# Patient Record
Sex: FEMALE | Race: BLACK OR AFRICAN AMERICAN | Employment: OTHER | URBAN - METROPOLITAN AREA
[De-identification: names, ages, dates, MRNs, and addresses within clinical notes are randomized per-mention and may not be internally consistent; named-entity substitution may affect disease eponyms.]

---

## 2017-01-03 ENCOUNTER — TELEPHONE (OUTPATIENT)
Dept: INTERNAL MEDICINE UNIT | Facility: HOSPITAL | Age: 37
End: 2017-01-03

## 2017-01-03 NOTE — TELEPHONE ENCOUNTER
Patient discharged from [facility] on December 31, 2016.   Please call patient to schedule hospital follow-up appointment with PCP, Dr. Earnest Ocampo.

## 2017-01-05 ENCOUNTER — OFFICE VISIT (OUTPATIENT)
Dept: INTERNAL MEDICINE CLINIC | Facility: CLINIC | Age: 37
End: 2017-01-05

## 2017-01-05 ENCOUNTER — TELEPHONE (OUTPATIENT)
Dept: INTERNAL MEDICINE CLINIC | Facility: CLINIC | Age: 37
End: 2017-01-05

## 2017-01-05 VITALS
BODY MASS INDEX: 33.15 KG/M2 | DIASTOLIC BLOOD PRESSURE: 72 MMHG | WEIGHT: 229 LBS | HEIGHT: 69.5 IN | TEMPERATURE: 98 F | OXYGEN SATURATION: 96 % | HEART RATE: 86 BPM | SYSTOLIC BLOOD PRESSURE: 112 MMHG

## 2017-01-05 DIAGNOSIS — K60.3 FISTULA, ANAL: Primary | ICD-10-CM

## 2017-01-05 DIAGNOSIS — Z86.018 HISTORY OF UTERINE FIBROID: ICD-10-CM

## 2017-01-05 DIAGNOSIS — R78.81 BACTEREMIA DUE TO GRAM-POSITIVE BACTERIA: ICD-10-CM

## 2017-01-05 DIAGNOSIS — L02.31 ABSCESS OF RIGHT BUTTOCK: ICD-10-CM

## 2017-01-05 DIAGNOSIS — L02.215 PERINEAL ABSCESS: ICD-10-CM

## 2017-01-05 PROCEDURE — 99214 OFFICE O/P EST MOD 30 MIN: CPT | Performed by: INTERNAL MEDICINE

## 2017-01-05 PROCEDURE — 99212 OFFICE O/P EST SF 10 MIN: CPT | Performed by: INTERNAL MEDICINE

## 2017-01-05 RX ORDER — HYDROCODONE BITARTRATE AND ACETAMINOPHEN 10; 325 MG/1; MG/1
1-2 TABLET ORAL EVERY 6 HOURS PRN
Qty: 60 TABLET | Refills: 0 | Status: SHIPPED | OUTPATIENT
Start: 2017-01-05 | End: 2017-01-30 | Stop reason: ALTCHOICE

## 2017-01-05 NOTE — TELEPHONE ENCOUNTER
Consent noted in media tab. Verified patient agreeable to having OV note and letter faxed. Today's progress note and letter faxed to [facility].

## 2017-01-05 NOTE — TELEPHONE ENCOUNTER
Nursing please try to arrange my office note to be faxed to her employer, [employer], see other correspondence, also a letter created today.

## 2017-01-05 NOTE — PATIENT INSTRUCTIONS
1. Fistula, anal  Cont meds  - HYDROcodone-acetaminophen  MG Oral Tab; Take 1-2 tablets by mouth every 6 (six) hours as needed for Pain. Dispense: 60 tablet; Refill: 0    2.  Abscess of right buttock  See the surgery and ID   - HYDROcodone-acetaminop

## 2017-01-05 NOTE — PROGRESS NOTES
HPI:   Sean Farfan is a 43year old female who presents for complains of: Patient presents with:  Hospital F/U: Perianal fistual dc'd 12/31/2016, Medication refill pended, dr. Solomon Caicedo, on IV invanz, daptomycin and plans on having a closure of the fis female had concerns including Hospital F/U.    1. Fistula, anal  Cont meds  - HYDROcodone-acetaminophen  MG Oral Tab; Take 1-2 tablets by mouth every 6 (six) hours as needed for Pain. Dispense: 60 tablet; Refill: 0    2.  Abscess of right buttock  Se

## 2017-01-10 ENCOUNTER — HOSPITAL ENCOUNTER (INPATIENT)
Facility: HOSPITAL | Age: 37
LOS: 3 days | Discharge: HOME HEALTH | DRG: 808 | End: 2017-01-13
Attending: PHYSICIAN ASSISTANT | Admitting: HOSPITALIST
Payer: COMMERCIAL

## 2017-01-10 ENCOUNTER — TELEPHONE (OUTPATIENT)
Dept: INTERNAL MEDICINE CLINIC | Facility: CLINIC | Age: 37
End: 2017-01-10

## 2017-01-10 DIAGNOSIS — Z45.2 PICC (PERIPHERALLY INSERTED CENTRAL CATHETER) IN PLACE: ICD-10-CM

## 2017-01-10 DIAGNOSIS — R50.9 FEVER, UNSPECIFIED FEVER CAUSE: Primary | ICD-10-CM

## 2017-01-10 DIAGNOSIS — E87.1 HYPONATREMIA: ICD-10-CM

## 2017-01-10 PROBLEM — R73.9 HYPERGLYCEMIA: Status: ACTIVE | Noted: 2017-01-10

## 2017-01-10 LAB
ANION GAP SERPL CALC-SCNC: 9 MMOL/L (ref 0–18)
BASOPHILS # BLD: 0 K/UL (ref 0–0.2)
BASOPHILS NFR BLD: 0 %
BILIRUB UR QL: NEGATIVE
BUN SERPL-MCNC: 6 MG/DL (ref 8–20)
BUN/CREAT SERPL: 8.8 (ref 10–20)
CALCIUM SERPL-MCNC: 8.9 MG/DL (ref 8.5–10.5)
CHLORIDE SERPL-SCNC: 98 MMOL/L (ref 95–110)
CLARITY UR: CLEAR
CO2 SERPL-SCNC: 25 MMOL/L (ref 22–32)
COLOR UR: YELLOW
CREAT SERPL-MCNC: 0.68 MG/DL (ref 0.5–1.5)
EOSINOPHIL # BLD: 0 K/UL (ref 0–0.7)
EOSINOPHIL NFR BLD: 0 %
ERYTHROCYTE [DISTWIDTH] IN BLOOD BY AUTOMATED COUNT: 23.5 % (ref 11–15)
GLUCOSE SERPL-MCNC: 123 MG/DL (ref 70–99)
GLUCOSE UR-MCNC: NEGATIVE MG/DL
HCT VFR BLD AUTO: 37.5 % (ref 35–48)
HGB BLD-MCNC: 12 G/DL (ref 12–16)
HGB UR QL STRIP.AUTO: NEGATIVE
KETONES UR-MCNC: NEGATIVE MG/DL
LACTATE SERPL-SCNC: 1 MMOL/L (ref 0.5–2.2)
LEUKOCYTE ESTERASE UR QL STRIP.AUTO: NEGATIVE
LYMPHOCYTES # BLD: 0.6 K/UL (ref 1–4)
LYMPHOCYTES NFR BLD: 8 %
MCH RBC QN AUTO: 24.5 PG (ref 27–32)
MCHC RBC AUTO-ENTMCNC: 31.9 G/DL (ref 32–37)
MCV RBC AUTO: 76.7 FL (ref 80–100)
MONOCYTES # BLD: 0.5 K/UL (ref 0–1)
MONOCYTES NFR BLD: 7 %
NEUTROPHILS # BLD AUTO: 7.1 K/UL (ref 1.8–7.7)
NEUTROPHILS NFR BLD: 85 %
NITRITE UR QL STRIP.AUTO: NEGATIVE
OSMOLALITY UR CALC.SUM OF ELEC: 273 MOSM/KG (ref 275–295)
PH UR: 5 [PH] (ref 5–8)
PLATELET # BLD AUTO: 207 K/UL (ref 140–400)
PMV BLD AUTO: 8.9 FL (ref 7.4–10.3)
POTASSIUM SERPL-SCNC: 3.6 MMOL/L (ref 3.3–5.1)
PROT UR-MCNC: NEGATIVE MG/DL
RBC # BLD AUTO: 4.89 M/UL (ref 3.7–5.4)
SODIUM SERPL-SCNC: 132 MMOL/L (ref 136–144)
SP GR UR STRIP: 1.01 (ref 1–1.03)
UROBILINOGEN UR STRIP-ACNC: <2
VIT C UR-MCNC: NEGATIVE MG/DL
WBC # BLD AUTO: 8.3 K/UL (ref 4–11)

## 2017-01-10 PROCEDURE — 99223 1ST HOSP IP/OBS HIGH 75: CPT | Performed by: HOSPITALIST

## 2017-01-10 RX ORDER — DOCUSATE SODIUM 100 MG/1
100 CAPSULE, LIQUID FILLED ORAL 2 TIMES DAILY
Status: DISCONTINUED | OUTPATIENT
Start: 2017-01-10 | End: 2017-01-13

## 2017-01-10 RX ORDER — MELATONIN
325
Status: DISCONTINUED | OUTPATIENT
Start: 2017-01-11 | End: 2017-01-13

## 2017-01-10 RX ORDER — MORPHINE SULFATE 2 MG/ML
1 INJECTION, SOLUTION INTRAMUSCULAR; INTRAVENOUS EVERY 2 HOUR PRN
Status: DISCONTINUED | OUTPATIENT
Start: 2017-01-10 | End: 2017-01-13

## 2017-01-10 RX ORDER — BISACODYL 10 MG
10 SUPPOSITORY, RECTAL RECTAL
Status: DISCONTINUED | OUTPATIENT
Start: 2017-01-10 | End: 2017-01-13

## 2017-01-10 RX ORDER — ZOLPIDEM TARTRATE 5 MG/1
5 TABLET ORAL NIGHTLY PRN
Status: DISCONTINUED | OUTPATIENT
Start: 2017-01-10 | End: 2017-01-13

## 2017-01-10 RX ORDER — ACETAMINOPHEN 325 MG/1
650 TABLET ORAL EVERY 6 HOURS PRN
Status: DISCONTINUED | OUTPATIENT
Start: 2017-01-10 | End: 2017-01-13

## 2017-01-10 RX ORDER — IBUPROFEN 600 MG/1
600 TABLET ORAL ONCE
Status: COMPLETED | OUTPATIENT
Start: 2017-01-10 | End: 2017-01-10

## 2017-01-10 RX ORDER — MORPHINE SULFATE 4 MG/ML
4 INJECTION, SOLUTION INTRAMUSCULAR; INTRAVENOUS EVERY 2 HOUR PRN
Status: DISCONTINUED | OUTPATIENT
Start: 2017-01-10 | End: 2017-01-13

## 2017-01-10 RX ORDER — HYDROCODONE BITARTRATE AND ACETAMINOPHEN 10; 325 MG/1; MG/1
1-2 TABLET ORAL EVERY 6 HOURS PRN
Status: DISCONTINUED | OUTPATIENT
Start: 2017-01-10 | End: 2017-01-13

## 2017-01-10 RX ORDER — POLYETHYLENE GLYCOL 3350 17 G/17G
17 POWDER, FOR SOLUTION ORAL DAILY PRN
Status: DISCONTINUED | OUTPATIENT
Start: 2017-01-10 | End: 2017-01-13

## 2017-01-10 RX ORDER — SODIUM PHOSPHATE, DIBASIC AND SODIUM PHOSPHATE, MONOBASIC 7; 19 G/133ML; G/133ML
1 ENEMA RECTAL ONCE AS NEEDED
Status: ACTIVE | OUTPATIENT
Start: 2017-01-10 | End: 2017-01-10

## 2017-01-10 RX ORDER — MORPHINE SULFATE 2 MG/ML
2 INJECTION, SOLUTION INTRAMUSCULAR; INTRAVENOUS EVERY 2 HOUR PRN
Status: DISCONTINUED | OUTPATIENT
Start: 2017-01-10 | End: 2017-01-13

## 2017-01-10 NOTE — TELEPHONE ENCOUNTER
Order signed and faxed back to [employer] [phone], conformation received. Original sent to scan.

## 2017-01-10 NOTE — ED NOTES
Pt c/o fever of 102 earlier at home. Pt took x2 doses of extra strength tylenol per ID MD. Pt has had a PICC line for the past few weeks for abx therapy due to two anal fistulas. Pt is scheduled for fistula repair on 1/18.

## 2017-01-11 LAB
ANION GAP SERPL CALC-SCNC: 7 MMOL/L (ref 0–18)
BASOPHILS # BLD: 0 K/UL (ref 0–0.2)
BASOPHILS NFR BLD: 1 %
BUN SERPL-MCNC: 8 MG/DL (ref 8–20)
BUN/CREAT SERPL: 11.1 (ref 10–20)
CALCIUM SERPL-MCNC: 8.7 MG/DL (ref 8.5–10.5)
CHLORIDE SERPL-SCNC: 105 MMOL/L (ref 95–110)
CO2 SERPL-SCNC: 26 MMOL/L (ref 22–32)
CREAT SERPL-MCNC: 0.72 MG/DL (ref 0.5–1.5)
EOSINOPHIL # BLD: 0 K/UL (ref 0–0.7)
EOSINOPHIL NFR BLD: 1 %
ERYTHROCYTE [DISTWIDTH] IN BLOOD BY AUTOMATED COUNT: 23.8 % (ref 11–15)
GLUCOSE SERPL-MCNC: 101 MG/DL (ref 70–99)
HCT VFR BLD AUTO: 35 % (ref 35–48)
HGB BLD-MCNC: 11.2 G/DL (ref 12–16)
LYMPHOCYTES # BLD: 0.9 K/UL (ref 1–4)
LYMPHOCYTES NFR BLD: 36 %
MCH RBC QN AUTO: 24.7 PG (ref 27–32)
MCHC RBC AUTO-ENTMCNC: 31.9 G/DL (ref 32–37)
MCV RBC AUTO: 77.3 FL (ref 80–100)
MONOCYTES # BLD: 0.3 K/UL (ref 0–1)
MONOCYTES NFR BLD: 12 %
NEUTROPHILS # BLD AUTO: 1.3 K/UL (ref 1.8–7.7)
NEUTROPHILS NFR BLD: 51 %
OSMOLALITY UR CALC.SUM OF ELEC: 284 MOSM/KG (ref 275–295)
PLATELET # BLD AUTO: 182 K/UL (ref 140–400)
PMV BLD AUTO: 8.8 FL (ref 7.4–10.3)
POTASSIUM SERPL-SCNC: 4.2 MMOL/L (ref 3.3–5.1)
RBC # BLD AUTO: 4.53 M/UL (ref 3.7–5.4)
SODIUM SERPL-SCNC: 138 MMOL/L (ref 136–144)
WBC # BLD AUTO: 2.5 K/UL (ref 4–11)

## 2017-01-11 PROCEDURE — 99233 SBSQ HOSP IP/OBS HIGH 50: CPT | Performed by: HOSPITALIST

## 2017-01-11 RX ORDER — SODIUM CHLORIDE 0.9 % (FLUSH) 0.9 %
SYRINGE (ML) INJECTION
Status: COMPLETED
Start: 2017-01-11 | End: 2017-01-11

## 2017-01-11 RX ORDER — POTASSIUM CHLORIDE 29.8 MG/ML
40 INJECTION INTRAVENOUS ONCE
Status: COMPLETED | OUTPATIENT
Start: 2017-01-11 | End: 2017-01-11

## 2017-01-11 RX ORDER — SODIUM CHLORIDE 0.9 % (FLUSH) 0.9 %
SYRINGE (ML) INJECTION
Status: DISPENSED
Start: 2017-01-11 | End: 2017-01-12

## 2017-01-11 NOTE — DISCHARGE PLANNING
ROLY is familiar with this pt from recent [address] visit. ROLY confirmed the pt is current with [facility] for RN services only. Pt is also current with [facility]. ROLY informed both agencies of pt's readmission.  C orders are needed at dc to resume the service

## 2017-01-11 NOTE — PROGRESS NOTES
[place] [facility]    Progress Note    Dedese Diedre Holstein Patient Status:  Inpatient    1980 MRN [id number]   Location [facility] 5SW/SE Attending Nick Chavez MD   [facility] Day # 1 PCP Felton Conner DO       Subjective:    Al

## 2017-01-11 NOTE — CONSULTS
INFECTIOUS DISEASE CONSULT NOTE    Mirian Cowart Patient Status:  Inpatient    1980 MRN [id number]   Location [facility] 5SW/SE Attending Eric Jimenez MD   Hosp Day # 1 PCP [facility] axillary flap. Started on Dapto/merop. Underwent MRI pelvis with finding of perianal fistula with communication with skin surface and abscess. Dischcharged on dapto/ertapenem. Now with fevers up to 102F at home and chills.      On admission Tm 99.5, WBC 8.3 reports that she does not drink alcohol or use illicit drugs.     Allergies:    Latex                   Anaphylaxis    Medications:    Current facility-administered medications:   •  Normal Saline Flush 0.9 % injection, , ,   •  acetaminophen (TYLENOL) tab area w/o erythema, warmth, ttp, drainage    Laboratory Data:    Recent Labs   Lab  01/11/17   0546   RBC  4.53   HGB  11.2*   HCT  35.0   MCV  77.3*   MCH  24.7*   MCHC  31.9*   RDW  23.8*   WBC  2.5*   PLT  182     Recent Labs   Lab  01/10/17   1546  01/1 bactrim and minocycline.  Now readmitted with right buttock cellulitis, ? abscess with fistula developed on po abx.  Scheduled 1/4/17 for left axillary flap. Started on Dapto/merop.  Underwent MRI pelvis with finding of perianal fistula with communication wi

## 2017-01-11 NOTE — H&P
[facility]    PATIENT'S NAME: Sarah Alcala   ATTENDING PHYSICIAN: Zo Martinez MD   PATIENT ACCOUNT#:   [de-identified]    LOCATION:  [address] [place] [address] RECORD #:   [id number]       YOB: 1980  ADMISSION DATE:       01/10/2  of myocardial infarction. Mother had hypertension and hypothyroidism. SOCIAL HISTORY:  No tobacco, alcohol, or drug use. Lives with her family. Usually independent for basic activities of daily living.      REVIEW OF SYSTEMS:  Fever and diaphore Pain control as needed. 5.   Follow up on culture results. 6.   Further recommendations to follow.     Dictated By Rhoda Mendoza MD  d: 01/10/2017 18:57:42  t: 01/10/2017 22:02:05  [facility] [id number]  FB/

## 2017-01-11 NOTE — TELEPHONE ENCOUNTER
call from Javier Branch NP in ER. pt has anal fistula and temp 102. 1. To admit. Routed to Dr. Namrata Camara as Simón Vivar.

## 2017-01-11 NOTE — ED PROVIDER NOTES
Patient Seen in: [facility] Emergency Department    History   Patient presents with:  Fever    Stated Complaint: fever/picc    HPI    24-year-old female presents with chief complaint of fever. Onset this morning.   Patient states that she was refe axilla flap, groin reconstruction    APPENDECTOMY         Medications :   HYDROcodone-acetaminophen  MG Oral Tab,  Take 1-2 tablets by mouth every 6 (six) hours as needed for Pain.    naproxen 500 MG Oral Tab,  Take 1 tablet (500 mg total) by mouth 2 distress. Psychological: Alert, No abnormalities of mood, affect. Eyes: Pupils are equal round reactive to light. Conjunctiva are within normal limits. ENT: Oropharynx is clear. Neck: The neck is supple. There is no evidence of JVD.   No meningeal sig Abnormal            Final result                 Please view results for these tests on the individual orders.    URINALYSIS WITH CULTURE REFLEX   RAINBOW DRAW BLUE   RAINBOW DRAW GOLD   RAINBOW DRAW LAVENDER   RAINBOW DRAW LIGHT GREEN   RAINBOW DRAW DARK

## 2017-01-12 LAB
ANION GAP SERPL CALC-SCNC: 7 MMOL/L (ref 0–18)
BASOPHILS # BLD: 0 K/UL (ref 0–0.2)
BASOPHILS NFR BLD: 1 %
BUN SERPL-MCNC: 6 MG/DL (ref 8–20)
BUN/CREAT SERPL: 8 (ref 10–20)
CALCIUM SERPL-MCNC: 8.7 MG/DL (ref 8.5–10.5)
CHLORIDE SERPL-SCNC: 104 MMOL/L (ref 95–110)
CO2 SERPL-SCNC: 27 MMOL/L (ref 22–32)
CREAT SERPL-MCNC: 0.75 MG/DL (ref 0.5–1.5)
EOSINOPHIL # BLD: 0 K/UL (ref 0–0.7)
EOSINOPHIL NFR BLD: 1 %
ERYTHROCYTE [DISTWIDTH] IN BLOOD BY AUTOMATED COUNT: 23.5 % (ref 11–15)
GLUCOSE SERPL-MCNC: 92 MG/DL (ref 70–99)
HCT VFR BLD AUTO: 36.1 % (ref 35–48)
HGB BLD-MCNC: 11.4 G/DL (ref 12–16)
LYMPHOCYTES # BLD: 2 K/UL (ref 1–4)
LYMPHOCYTES NFR BLD: 61 %
MCH RBC QN AUTO: 24.7 PG (ref 27–32)
MCHC RBC AUTO-ENTMCNC: 31.7 G/DL (ref 32–37)
MCV RBC AUTO: 78 FL (ref 80–100)
MONOCYTES # BLD: 0.7 K/UL (ref 0–1)
MONOCYTES NFR BLD: 21 %
NEUTROPHILS # BLD AUTO: 0.5 K/UL (ref 1.8–7.7)
NEUTROPHILS NFR BLD: 15 %
OSMOLALITY UR CALC.SUM OF ELEC: 283 MOSM/KG (ref 275–295)
PLATELET # BLD AUTO: 191 K/UL (ref 140–400)
PMV BLD AUTO: 8.8 FL (ref 7.4–10.3)
POTASSIUM SERPL-SCNC: 3.8 MMOL/L (ref 3.3–5.1)
RBC # BLD AUTO: 4.62 M/UL (ref 3.7–5.4)
SODIUM SERPL-SCNC: 138 MMOL/L (ref 136–144)
WBC # BLD AUTO: 3.2 K/UL (ref 4–11)

## 2017-01-12 PROCEDURE — 99233 SBSQ HOSP IP/OBS HIGH 50: CPT | Performed by: HOSPITALIST

## 2017-01-12 RX ORDER — METRONIDAZOLE 500 MG/100ML
500 INJECTION, SOLUTION INTRAVENOUS EVERY 8 HOURS
Status: DISCONTINUED | OUTPATIENT
Start: 2017-01-12 | End: 2017-01-13

## 2017-01-12 RX ORDER — LEVOFLOXACIN 5 MG/ML
750 INJECTION, SOLUTION INTRAVENOUS EVERY 24 HOURS
Status: DISCONTINUED | OUTPATIENT
Start: 2017-01-12 | End: 2017-01-13

## 2017-01-12 RX ORDER — SODIUM CHLORIDE 0.9 % (FLUSH) 0.9 %
SYRINGE (ML) INJECTION
Status: COMPLETED
Start: 2017-01-12 | End: 2017-01-12

## 2017-01-12 NOTE — PROGRESS NOTES
INFECTIOUS DISEASE PROGRESS NOTE    Mirian Valerio Patient Status:  Inpatient    1980 MRN [id number]   Location [facility] 5SW/SE Attending Nidhi Belcher MD   [address] Day # 2 PCP Savannah Mae DO     Subjective:  Afebrile.  No acu therapy recently admitted to [facility] 10/21 - 10/26 with increasing pain and drainage from right buttocks with associated fevers at home to 101.2.  Patient seen in urgent care center on 10/5 for similar symptoms and given Bactrim and Keflex which patient finish    # Hidradenitis suppurativa with h/o multiple surgeries to bilateral axilla and pelvic region flap and skin grafting - most recently March 2016. Scheduled 1/4/17 for left axillary flap.     Recommendations:    - continue daptomycin  - stop meropenem  - st

## 2017-01-13 VITALS
OXYGEN SATURATION: 95 % | BODY MASS INDEX: 33.77 KG/M2 | HEIGHT: 69 IN | WEIGHT: 228 LBS | TEMPERATURE: 98 F | RESPIRATION RATE: 16 BRPM | HEART RATE: 61 BPM | SYSTOLIC BLOOD PRESSURE: 103 MMHG | DIASTOLIC BLOOD PRESSURE: 55 MMHG

## 2017-01-13 LAB
ANION GAP SERPL CALC-SCNC: 5 MMOL/L (ref 0–18)
BASOPHILS # BLD: 0 K/UL (ref 0–0.2)
BASOPHILS NFR BLD: 1 %
BUN SERPL-MCNC: 9 MG/DL (ref 8–20)
BUN/CREAT SERPL: 12.5 (ref 10–20)
CALCIUM SERPL-MCNC: 8.9 MG/DL (ref 8.5–10.5)
CHLORIDE SERPL-SCNC: 105 MMOL/L (ref 95–110)
CO2 SERPL-SCNC: 28 MMOL/L (ref 22–32)
CREAT SERPL-MCNC: 0.72 MG/DL (ref 0.5–1.5)
EOSINOPHIL # BLD: 0.1 K/UL (ref 0–0.7)
EOSINOPHIL NFR BLD: 1 %
ERYTHROCYTE [DISTWIDTH] IN BLOOD BY AUTOMATED COUNT: 23.3 % (ref 11–15)
GLUCOSE SERPL-MCNC: 90 MG/DL (ref 70–99)
HCT VFR BLD AUTO: 35 % (ref 35–48)
HGB BLD-MCNC: 11.2 G/DL (ref 12–16)
LYMPHOCYTES # BLD: 2.7 K/UL (ref 1–4)
LYMPHOCYTES NFR BLD: 64 %
MCH RBC QN AUTO: 24.9 PG (ref 27–32)
MCHC RBC AUTO-ENTMCNC: 32 G/DL (ref 32–37)
MCV RBC AUTO: 77.7 FL (ref 80–100)
MONOCYTES # BLD: 0.5 K/UL (ref 0–1)
MONOCYTES NFR BLD: 13 %
NEUTROPHILS # BLD AUTO: 0.9 K/UL (ref 1.8–7.7)
NEUTROPHILS NFR BLD: 21 %
OSMOLALITY UR CALC.SUM OF ELEC: 284 MOSM/KG (ref 275–295)
PLATELET # BLD AUTO: 208 K/UL (ref 140–400)
PMV BLD AUTO: 8.7 FL (ref 7.4–10.3)
POTASSIUM SERPL-SCNC: 4.3 MMOL/L (ref 3.3–5.1)
RBC # BLD AUTO: 4.51 M/UL (ref 3.7–5.4)
SODIUM SERPL-SCNC: 138 MMOL/L (ref 136–144)
WBC # BLD AUTO: 4.2 K/UL (ref 4–11)

## 2017-01-13 PROCEDURE — 99239 HOSP IP/OBS DSCHRG MGMT >30: CPT | Performed by: HOSPITALIST

## 2017-01-13 RX ORDER — MAGNESIUM HYDROXIDE/ALUMINUM HYDROXICE/SIMETHICONE 120; 1200; 1200 MG/30ML; MG/30ML; MG/30ML
30 SUSPENSION ORAL ONCE
Status: DISCONTINUED | OUTPATIENT
Start: 2017-01-13 | End: 2017-01-13 | Stop reason: RX

## 2017-01-13 RX ORDER — LEVOFLOXACIN 750 MG/1
750 TABLET ORAL DAILY
Qty: 7 TABLET | Refills: 0 | Status: SHIPPED | OUTPATIENT
Start: 2017-01-13 | End: 2017-01-20

## 2017-01-13 RX ORDER — PANTOPRAZOLE SODIUM 40 MG/1
40 TABLET, DELAYED RELEASE ORAL
Status: DISCONTINUED | OUTPATIENT
Start: 2017-01-13 | End: 2017-01-13

## 2017-01-13 RX ORDER — SODIUM CHLORIDE 0.9 % (FLUSH) 0.9 %
SYRINGE (ML) INJECTION
Status: COMPLETED
Start: 2017-01-13 | End: 2017-01-13

## 2017-01-13 RX ORDER — METRONIDAZOLE 500 MG/1
500 TABLET ORAL 3 TIMES DAILY
Qty: 21 TABLET | Refills: 0 | Status: SHIPPED | OUTPATIENT
Start: 2017-01-13 | End: 2017-01-20

## 2017-01-13 RX ORDER — PHENOBARBITAL, HYOSCYAMINE SULFATE, ATROPINE SULFATE, SCOPOLAMINE HYDROBROMIDE .0194; .1037; 16.2; .0065 MG/5ML; MG/5ML; MG/5ML; MG/5ML
10 ELIXIR ORAL ONCE
Status: DISCONTINUED | OUTPATIENT
Start: 2017-01-13 | End: 2017-01-13 | Stop reason: RX

## 2017-01-13 NOTE — PROGRESS NOTES
INFECTIOUS DISEASE PROGRESS NOTE    Alvmaribelse Levy Koyanagi Patient Status:  Inpatient    1980 MRN [id number]   Location [facility] 5SW/SE Attending Wilma Quiroga MD   [facility] Day # 3 PCP Mateus Sebastian DO     Subjective:  Afebrile.  No acu axilla including flap and skin grafting to Remicade therapy in the past but not currently on specific therapy recently admitted to [address] [place] [address] 10/21 - 10/26 with increasing pain and drainage from right buttocks with associated fevers at home to 101.2.  Patient see intra-op colonoscopy w/o fistula.  Repeat CT pelvis 11/8 with possible fistula or abscess, s/p prolonged course iv abx, daptomycin.    # Hidradenitis suppurativa with h/o multiple surgeries to bilateral axilla and pelvic region flap and skin grafting - mos

## 2017-01-13 NOTE — DISCHARGE PLANNING
SW spoke with ID who states pt is stable to return back home. MD order was already obtained by the primary. SW placed call to [facility]/Radha and sent the clinical information to [facility] and [facility].     CINDY underwood [id number]

## 2017-01-13 NOTE — DISCHARGE SUMMARY
[place] [facility] - [facility]    Discharge Summary    Mirian Andrade Patient Status:  Inpatient    1980 MRN [id number]   Location [facility] 5SW/SE Attending Lianet Rios MD   [facility] Day # 3 PCP Destiny Murray,      Date of Admiss antibiotics    Right buttock cellulits with phlegmon and perianal fistula  - on antibioitics  - scheduled for fistula surgery on 1/18        Hyponatremia  - secondary to dehydration  - now resolving      Hidrantitis Suppranteva  -on daptomycin  - levaquin (500 mg total) by mouth 3 (three) times daily.     Stop taking on:  1/20/2017   Quantity:  21 tablet   Refills:  0         CONTINUE taking these medications       Instructions Prescription details    DAPTOmycin 50 mg/mL in Sterile Water for Injection   Last

## 2017-01-13 NOTE — PROGRESS NOTES
[facility]    Progress Note    Mirian Cowart Patient Status:  Inpatient    1980 MRN [id number]   Location [facility] 5SW/SE Attending Eric Jimenez MD   [facility] Day # 2 PCP Nicole Vences DO       Subjective:    Al fevers  - secondary to above  - on antibiotics    Right buttock cellulits with phlegmon and perianal fistula  - on antibioitics  - scheduled for fistula surgery on 1/18        Hyponatremia  - secondary to dehydration  - now resolving  - monitor BMP     Hid

## 2017-01-13 NOTE — PLAN OF CARE
Infection    • Signs and symptoms of infections are decreased or avoided Progressing        PAIN - ADULT    • Verbalizes/displays adequate comfort level or patient's stated pain goal Progressing        Patient/Family Goals    • Patient/Family [facility]

## 2017-01-13 NOTE — PAYOR COMM NOTE
Heber Velazquez #[id number]  (85 year old F)       [facility] [id number]         Ariela Feliciano MD Physician Signed  Consults 1/11/2017  2:43 PM     Consult Orders:     IP consult to [facility] [[id number]] ordered by Kriss Butler, buttock incision with increased pain with improved R buttock symptoms.  Also with increasing L axillary pain.  Admitted 11/8-11 with bilateral buttock cellulitis and abscess.  Was discharged on daptomycin and invanz.  Followed by Dr Dominguez Gray, seen in office Comment  Procedure: VXLLRUOTFFA;  Surgeon: Shantelle Jason MD;  Location: [facility] MAIN OR      SKIN SURGERY          Comment  rt axilla flap, groin reconstruction      APPENDECTOMY          Family History    Problem  Relation  Age of Onset    •  Other[other] [OTH negatives noted in the the HPI. Physical Exam:  Vital signs: Blood pressure 111/51, pulse 78, temperature 97.3 °F (36.3 °C), temperature source Axillary, resp.  rate 18, height 5' 9\" (1.753 m), weight 228 lb (103.42 kg), last menstrual period 12/27/2016 Zosyn.  CT pelvis 10/21 with perianal inflammatory changes R>L, small fluid collection R perianal region concerning for abscess. Seen by surgery and concern for possible fistula. Taken to OR 10/23 for I&D and C-scope w/o fistula.  Cx with S. Not aureus and patient, , RN, Dr. Lucia Nieves  - will follow    Thank you for allowing me to participate in the care of this patient.  Please do not hesitate to call if you have any questions.    I will continue to follow with you and will make further recommendations based o CO2  26  01/11/2017    GLU  101*  01/11/2017    CA  8.7  01/11/2017    ALB  3.2*  12/28/2016    ALKPHO  73  12/28/2016    BILT  0.4  12/28/2016    TP  6.5  12/28/2016    AST  17  12/28/2016    ALT  12*  12/28/2016    TSH  0.86  07/23/2016    MG  1.7*  10

## 2017-01-17 ENCOUNTER — TELEPHONE (OUTPATIENT)
Dept: INTERNAL MEDICINE UNIT | Facility: HOSPITAL | Age: 37
End: 2017-01-17

## 2017-01-17 NOTE — TELEPHONE ENCOUNTER
Patient discharged from [facility] on January 13, 2017.  Please call patient to schedule hospital follow-up appointment with PCP, .

## 2017-01-18 ENCOUNTER — ANESTHESIA EVENT (OUTPATIENT)
Dept: SURGERY | Facility: HOSPITAL | Age: 37
End: 2017-01-18
Payer: COMMERCIAL

## 2017-01-18 ENCOUNTER — ANESTHESIA (OUTPATIENT)
Dept: SURGERY | Facility: HOSPITAL | Age: 37
End: 2017-01-18
Payer: COMMERCIAL

## 2017-01-18 ENCOUNTER — HOSPITAL ENCOUNTER (OUTPATIENT)
Facility: HOSPITAL | Age: 37
Setting detail: HOSPITAL OUTPATIENT SURGERY
Discharge: HOME | End: 2017-01-18
Attending: SPECIALIST | Admitting: SPECIALIST
Payer: COMMERCIAL

## 2017-01-18 ENCOUNTER — SURGERY (OUTPATIENT)
Age: 37
End: 2017-01-18

## 2017-01-18 VITALS
DIASTOLIC BLOOD PRESSURE: 68 MMHG | SYSTOLIC BLOOD PRESSURE: 114 MMHG | BODY MASS INDEX: 33.01 KG/M2 | OXYGEN SATURATION: 100 % | RESPIRATION RATE: 14 BRPM | HEIGHT: 69.5 IN | HEART RATE: 58 BPM | TEMPERATURE: 97 F | WEIGHT: 228 LBS

## 2017-01-18 DIAGNOSIS — K61.1 PERI-RECTAL ABSCESS: Primary | ICD-10-CM

## 2017-01-18 LAB — B-HCG UR QL: NEGATIVE

## 2017-01-18 PROCEDURE — 81025 URINE PREGNANCY TEST: CPT

## 2017-01-18 PROCEDURE — 0DBP0ZZ EXCISION OF RECTUM, OPEN APPROACH: ICD-10-PCS | Performed by: SPECIALIST

## 2017-01-18 PROCEDURE — 88304 TISSUE EXAM BY PATHOLOGIST: CPT | Performed by: SPECIALIST

## 2017-01-18 PROCEDURE — 0DJD8ZZ INSPECTION OF LOWER INTESTINAL TRACT, VIA NATURAL OR ARTIFICIAL OPENING ENDOSCOPIC: ICD-10-PCS | Performed by: SPECIALIST

## 2017-01-18 RX ORDER — MORPHINE SULFATE 2 MG/ML
2 INJECTION, SOLUTION INTRAMUSCULAR; INTRAVENOUS EVERY 10 MIN PRN
Status: DISCONTINUED | OUTPATIENT
Start: 2017-01-18 | End: 2017-01-18

## 2017-01-18 RX ORDER — DEXAMETHASONE SODIUM PHOSPHATE 4 MG/ML
VIAL (ML) INJECTION AS NEEDED
Status: DISCONTINUED | OUTPATIENT
Start: 2017-01-18 | End: 2017-01-18 | Stop reason: SURG

## 2017-01-18 RX ORDER — LIDOCAINE HYDROCHLORIDE 10 MG/ML
INJECTION, SOLUTION EPIDURAL; INFILTRATION; INTRACAUDAL; PERINEURAL AS NEEDED
Status: DISCONTINUED | OUTPATIENT
Start: 2017-01-18 | End: 2017-01-18 | Stop reason: SURG

## 2017-01-18 RX ORDER — ONDANSETRON 2 MG/ML
4 INJECTION INTRAMUSCULAR; INTRAVENOUS ONCE AS NEEDED
Status: DISCONTINUED | OUTPATIENT
Start: 2017-01-18 | End: 2017-01-18

## 2017-01-18 RX ORDER — HYDROCODONE BITARTRATE AND ACETAMINOPHEN 5; 325 MG/1; MG/1
2 TABLET ORAL AS NEEDED
Status: DISCONTINUED | OUTPATIENT
Start: 2017-01-18 | End: 2017-01-18

## 2017-01-18 RX ORDER — HYDROMORPHONE HYDROCHLORIDE 1 MG/ML
0.2 INJECTION, SOLUTION INTRAMUSCULAR; INTRAVENOUS; SUBCUTANEOUS EVERY 5 MIN PRN
Status: DISCONTINUED | OUTPATIENT
Start: 2017-01-18 | End: 2017-01-18

## 2017-01-18 RX ORDER — METOCLOPRAMIDE 10 MG/1
10 TABLET ORAL ONCE
Status: COMPLETED | OUTPATIENT
Start: 2017-01-18 | End: 2017-01-18

## 2017-01-18 RX ORDER — MORPHINE SULFATE 10 MG/ML
6 INJECTION, SOLUTION INTRAMUSCULAR; INTRAVENOUS EVERY 10 MIN PRN
Status: DISCONTINUED | OUTPATIENT
Start: 2017-01-18 | End: 2017-01-18

## 2017-01-18 RX ORDER — BUPIVACAINE HYDROCHLORIDE 5 MG/ML
INJECTION, SOLUTION EPIDURAL; INTRACAUDAL AS NEEDED
Status: DISCONTINUED | OUTPATIENT
Start: 2017-01-18 | End: 2017-01-18 | Stop reason: HOSPADM

## 2017-01-18 RX ORDER — SODIUM CHLORIDE, SODIUM LACTATE, POTASSIUM CHLORIDE, CALCIUM CHLORIDE 600; 310; 30; 20 MG/100ML; MG/100ML; MG/100ML; MG/100ML
INJECTION, SOLUTION INTRAVENOUS CONTINUOUS PRN
Status: DISCONTINUED | OUTPATIENT
Start: 2017-01-18 | End: 2017-01-18 | Stop reason: SURG

## 2017-01-18 RX ORDER — LEVOFLOXACIN 5 MG/ML
500 INJECTION, SOLUTION INTRAVENOUS EVERY 24 HOURS
Status: DISCONTINUED | OUTPATIENT
Start: 2017-01-18 | End: 2017-01-18

## 2017-01-18 RX ORDER — FAMOTIDINE 20 MG/1
20 TABLET ORAL ONCE
Status: COMPLETED | OUTPATIENT
Start: 2017-01-18 | End: 2017-01-18

## 2017-01-18 RX ORDER — SODIUM CHLORIDE, SODIUM LACTATE, POTASSIUM CHLORIDE, CALCIUM CHLORIDE 600; 310; 30; 20 MG/100ML; MG/100ML; MG/100ML; MG/100ML
INJECTION, SOLUTION INTRAVENOUS CONTINUOUS
Status: DISCONTINUED | OUTPATIENT
Start: 2017-01-18 | End: 2017-01-18

## 2017-01-18 RX ORDER — NALOXONE HYDROCHLORIDE 0.4 MG/ML
80 INJECTION, SOLUTION INTRAMUSCULAR; INTRAVENOUS; SUBCUTANEOUS AS NEEDED
Status: DISCONTINUED | OUTPATIENT
Start: 2017-01-18 | End: 2017-01-18

## 2017-01-18 RX ORDER — KETOROLAC TROMETHAMINE 30 MG/ML
INJECTION, SOLUTION INTRAMUSCULAR; INTRAVENOUS AS NEEDED
Status: DISCONTINUED | OUTPATIENT
Start: 2017-01-18 | End: 2017-01-18 | Stop reason: SURG

## 2017-01-18 RX ORDER — MORPHINE SULFATE 4 MG/ML
4 INJECTION, SOLUTION INTRAMUSCULAR; INTRAVENOUS EVERY 10 MIN PRN
Status: DISCONTINUED | OUTPATIENT
Start: 2017-01-18 | End: 2017-01-18

## 2017-01-18 RX ORDER — HYDROMORPHONE HYDROCHLORIDE 1 MG/ML
0.4 INJECTION, SOLUTION INTRAMUSCULAR; INTRAVENOUS; SUBCUTANEOUS EVERY 5 MIN PRN
Status: DISCONTINUED | OUTPATIENT
Start: 2017-01-18 | End: 2017-01-18

## 2017-01-18 RX ORDER — HYDROMORPHONE HYDROCHLORIDE 1 MG/ML
0.6 INJECTION, SOLUTION INTRAMUSCULAR; INTRAVENOUS; SUBCUTANEOUS EVERY 5 MIN PRN
Status: DISCONTINUED | OUTPATIENT
Start: 2017-01-18 | End: 2017-01-18

## 2017-01-18 RX ORDER — MIDAZOLAM HYDROCHLORIDE 1 MG/ML
INJECTION INTRAMUSCULAR; INTRAVENOUS AS NEEDED
Status: DISCONTINUED | OUTPATIENT
Start: 2017-01-18 | End: 2017-01-18 | Stop reason: SURG

## 2017-01-18 RX ORDER — HYDROCODONE BITARTRATE AND ACETAMINOPHEN 5; 325 MG/1; MG/1
1 TABLET ORAL AS NEEDED
Status: DISCONTINUED | OUTPATIENT
Start: 2017-01-18 | End: 2017-01-18

## 2017-01-18 RX ORDER — ACETAMINOPHEN 325 MG/1
650 TABLET ORAL ONCE
Status: COMPLETED | OUTPATIENT
Start: 2017-01-18 | End: 2017-01-18

## 2017-01-18 RX ORDER — HALOPERIDOL 5 MG/ML
0.25 INJECTION INTRAMUSCULAR ONCE AS NEEDED
Status: DISCONTINUED | OUTPATIENT
Start: 2017-01-18 | End: 2017-01-18

## 2017-01-18 RX ORDER — HYDROCODONE BITARTRATE AND ACETAMINOPHEN 10; 325 MG/1; MG/1
1 TABLET ORAL ONCE
Status: COMPLETED | OUTPATIENT
Start: 2017-01-18 | End: 2017-01-18

## 2017-01-18 RX ORDER — ROCURONIUM BROMIDE 10 MG/ML
INJECTION, SOLUTION INTRAVENOUS AS NEEDED
Status: DISCONTINUED | OUTPATIENT
Start: 2017-01-18 | End: 2017-01-18 | Stop reason: SURG

## 2017-01-18 RX ORDER — NEOSTIGMINE METHYLSULFATE 0.5 MG/ML
INJECTION INTRAVENOUS AS NEEDED
Status: DISCONTINUED | OUTPATIENT
Start: 2017-01-18 | End: 2017-01-18 | Stop reason: SURG

## 2017-01-18 RX ORDER — GLYCOPYRROLATE 0.2 MG/ML
INJECTION INTRAMUSCULAR; INTRAVENOUS AS NEEDED
Status: DISCONTINUED | OUTPATIENT
Start: 2017-01-18 | End: 2017-01-18 | Stop reason: SURG

## 2017-01-18 RX ADMIN — ROCURONIUM BROMIDE 40 MG: 10 INJECTION, SOLUTION INTRAVENOUS at 09:16:00

## 2017-01-18 RX ADMIN — MIDAZOLAM HYDROCHLORIDE 2 MG: 1 INJECTION INTRAMUSCULAR; INTRAVENOUS at 09:12:00

## 2017-01-18 RX ADMIN — SODIUM CHLORIDE, SODIUM LACTATE, POTASSIUM CHLORIDE, CALCIUM CHLORIDE: 600; 310; 30; 20 INJECTION, SOLUTION INTRAVENOUS at 09:09:00

## 2017-01-18 RX ADMIN — GLYCOPYRROLATE 0.6 MG: 0.2 INJECTION INTRAMUSCULAR; INTRAVENOUS at 10:17:00

## 2017-01-18 RX ADMIN — LEVOFLOXACIN 500 MG: 5 INJECTION, SOLUTION INTRAVENOUS at 09:18:00

## 2017-01-18 RX ADMIN — GLYCOPYRROLATE 0.2 MG: 0.2 INJECTION INTRAMUSCULAR; INTRAVENOUS at 09:16:00

## 2017-01-18 RX ADMIN — DEXAMETHASONE SODIUM PHOSPHATE 4 MG: 4 MG/ML VIAL (ML) INJECTION at 09:16:00

## 2017-01-18 RX ADMIN — LIDOCAINE HYDROCHLORIDE 25 MG: 10 INJECTION, SOLUTION EPIDURAL; INFILTRATION; INTRACAUDAL; PERINEURAL at 09:16:00

## 2017-01-18 RX ADMIN — KETOROLAC TROMETHAMINE 30 MG: 30 INJECTION, SOLUTION INTRAMUSCULAR; INTRAVENOUS at 10:21:00

## 2017-01-18 RX ADMIN — SODIUM CHLORIDE, SODIUM LACTATE, POTASSIUM CHLORIDE, CALCIUM CHLORIDE: 600; 310; 30; 20 INJECTION, SOLUTION INTRAVENOUS at 10:40:00

## 2017-01-18 RX ADMIN — NEOSTIGMINE METHYLSULFATE 4 MG: 0.5 INJECTION INTRAVENOUS at 10:17:00

## 2017-01-18 NOTE — OPERATIVE REPORT
[facility]    PATIENT'S NAME: Ewelina Newman   ATTENDING PHYSICIAN: Aleja Colindres MD   OPERATING PHYSICIAN: Aleja Colindres MD   PATIENT ACCOUNT#:   [de-identified]    LOCATION:  [place] [address]  MEDICAL RECORD #:   [id number]       MILAGROS side and had a similar finding of a chronic fistula. We placed our probe. This went through also higher, above the sphincter, but not quite as high as the left side. We felt with these findings, we would have to place bilateral setons.   We began on the

## 2017-01-18 NOTE — ANESTHESIA POSTPROCEDURE EVALUATION
Patient: Beatrice Colonel    Procedure Summary     Date Anesthesia Start Anesthesia Stop Room / Location    01/18/17 0909 1040 [address] [facility] MAIN OR 02 / [facility] MAIN OR       Procedure Diagnosis Surgeon Responsible Provider    ANAL FISTULECTOMY (N/A ); PROCTOSCOPY (N

## 2017-01-18 NOTE — H&P
[address] [facility] Patient Status:  Hospital Outpatient Surgery    1980 MRN [id number]   Location [facility] PRE OP RECOVERY Attending Syed Bagley MD   Hosp Day # 0 PCP Paula Gisbon Associated Father    • Substance Abuse Father    • Hypertension Mother    • Thyroid disease Mother    • Cancer Sister      brain tumor      reports that she has never smoked. She does not have any smokeless tobacco history on file.  She reports that she duncan 12/28/2016   AST 17 12/28/2016   ALT 12* 12/28/2016   TSH 0.86 07/23/2016   MG 1.7* 10/26/2016   CK 40 11/17/2016       Imaging:    Mri Pelvis (soft Tissue)(contrast Only) (cpt=72196)    12/31/2016  PROCEDURE: MRI PELVIS (SOFT TISSUE) (W+WO) (CPT=72197)  C suspicious for a perianal phlegmon or partially collapsed abscess. The tracts communicate with the skin surface best seen on sequence 7 images 36-39 and 44. UTERUS: Size is 6.7 x 5.4 x 10 cm. multiple T2 hypointense myometrial masses are present.  The larg [facility], CT PELVIS (CONTRAST ONLY) (CPT=72193), 10/21/2016, 16:58. [facility], CT PELVIS (CONTRAST ONLY) (CPT=72193), 11/08/2016, 20:58. INDICATIONS: Perirectal erythema and pain; history of perirectal abscess.   [address] [address] [facility] [address]  N partially calcified at the posterior uterine body a probably represents a degenerating intramural fibroid. Bilateral Essure coils are present in the proximal fallopian tubes. A small volume of free pelvic fluid is present.  VASCULATURE:   No aneurysm is det

## 2017-01-18 NOTE — BRIEF OP NOTE
Jeni 43  Brief Op Note     Mirian Best Location: OR   [facility] [id number] MRN [id number]   Admission Date 1/18/2017 Operation Date 1/18/2017   Attending Physician Frankey Pean., MD Operating Physician Avinash Miller MD

## 2017-01-18 NOTE — ANESTHESIA PREPROCEDURE EVALUATION
Anesthesia PreOp Note    HPI:     Sandy Martinez is a 43year old female who presents for preoperative consultation requested by: Juan A Lake MD    Date of Surgery: 1/18/2017    Procedure(s):  ANAL FISTULECTOMY  PROCTOSCOPY  Indication: BARBARA REC EGD N/A 10/23/2016    Comment Procedure: ESOPHAGOGASTRODUODENOSCOPY (EGD);   Surgeon: Sabrina Sorto MD;  Location: [address] [place] [address] OR    COLONOSCOPY N/A 10/23/2016    Comment Procedure: COLONOSCOPY;  Surgeon: Sabrina Sorto MD;  Location: [address] [place] [address] OR    SKIN SURGERY History   Marital Status: Single  Spouse Name: N/A    Years of Education: N/A  Number of Children: N/A     Occupational History  None on file     Social History Main Topics   Smoking status: Never Smoker     Smokeless tobacco: Not on file    Alcohol Use: N mother      I have informed Mirian Contreras Thaddeusalexa  of the nature of the anesthetic plan, benefits, risks, major complications, and any alternative forms of anesthetic management. All of the patient's questions were answered to the best of my ability.  The pa

## 2017-01-30 ENCOUNTER — OFFICE VISIT (OUTPATIENT)
Dept: INTERNAL MEDICINE CLINIC | Facility: CLINIC | Age: 37
End: 2017-01-30

## 2017-01-30 VITALS
SYSTOLIC BLOOD PRESSURE: 110 MMHG | DIASTOLIC BLOOD PRESSURE: 80 MMHG | HEIGHT: 69.5 IN | OXYGEN SATURATION: 99 % | WEIGHT: 230 LBS | TEMPERATURE: 98 F | BODY MASS INDEX: 33.3 KG/M2 | HEART RATE: 88 BPM

## 2017-01-30 DIAGNOSIS — R78.81 BACTEREMIA DUE TO GRAM-POSITIVE BACTERIA: ICD-10-CM

## 2017-01-30 DIAGNOSIS — L02.215 PERINEAL ABSCESS: ICD-10-CM

## 2017-01-30 DIAGNOSIS — L02.31 ABSCESS OF RIGHT BUTTOCK: Primary | ICD-10-CM

## 2017-01-30 PROBLEM — Z45.2 PICC (PERIPHERALLY INSERTED CENTRAL CATHETER) IN PLACE: Status: RESOLVED | Noted: 2017-01-10 | Resolved: 2017-01-30

## 2017-01-30 PROCEDURE — 99214 OFFICE O/P EST MOD 30 MIN: CPT | Performed by: INTERNAL MEDICINE

## 2017-01-30 PROCEDURE — 99212 OFFICE O/P EST SF 10 MIN: CPT | Performed by: INTERNAL MEDICINE

## 2017-01-30 NOTE — PROGRESS NOTES
HPI:   Fabian Mejia is a 43year old female who presents for complains of: Patient presents with:  Hospital F/U: Pt had surgery 1/81/7 with Dr. Carlita Valenzuela for Bilateral rectal fistulotomies with placement of bilateral setons and proctoscopy.  Pt saw surge AND PLAN:   Sean Farfan is a 43year old female had concerns including Hospital F/U.    1. Abscess of right buttock  Cont on the current plan and f/u with the surgeon, currently with seton and drainage devices in, ok to take 2 of the tramadol at a d

## 2017-01-30 NOTE — PATIENT INSTRUCTIONS
1. Abscess of right buttock  Cont on the current plan and f/u with the surgeon, currently with seton and drainage devices in, ok to take 2 of the tramadol at a dose  Expect to be off of work for at least another 2 wks, surgical followup will determine this

## 2017-02-16 ENCOUNTER — TELEPHONE (OUTPATIENT)
Dept: INTERNAL MEDICINE CLINIC | Facility: CLINIC | Age: 37
End: 2017-02-16

## 2017-02-16 NOTE — TELEPHONE ENCOUNTER
Please call pt  Requesting that Dr Berenice Trevino please sent note to [facility] with update for pt disability.   Update required every two weeks, due today or tomorrow  Pt seeing Dr Mohan Barrow 2/23/17, not released back to work yet  Information for [employer] on file for

## 2017-02-21 NOTE — TELEPHONE ENCOUNTER
Nursing send them (with the explicit verbal consent from the patient) whenever office documentation they need. My last notes will likely work for this. The next update can be given to them by their surgeon at their visit. Okay to do tomorrow.

## 2017-02-21 NOTE — TELEPHONE ENCOUNTER
Spoke with patient she states \" you already have my consent for months now. \" Patient states she no longer needs note sent to employer, her surgeon has sent note to update disability.

## 2017-03-30 ENCOUNTER — OFFICE VISIT (OUTPATIENT)
Dept: INTERNAL MEDICINE CLINIC | Facility: CLINIC | Age: 37
End: 2017-03-30

## 2017-03-30 VITALS
TEMPERATURE: 98 F | SYSTOLIC BLOOD PRESSURE: 120 MMHG | WEIGHT: 226 LBS | HEIGHT: 69.5 IN | BODY MASS INDEX: 32.72 KG/M2 | DIASTOLIC BLOOD PRESSURE: 82 MMHG | HEART RATE: 84 BPM

## 2017-03-30 DIAGNOSIS — T45.4X5A IRON ADVERSE REACTION, INITIAL ENCOUNTER: ICD-10-CM

## 2017-03-30 DIAGNOSIS — G43.109 MIGRAINE WITH AURA AND WITHOUT STATUS MIGRAINOSUS, NOT INTRACTABLE: ICD-10-CM

## 2017-03-30 DIAGNOSIS — D50.8 IRON DEFICIENCY ANEMIA SECONDARY TO INADEQUATE DIETARY IRON INTAKE: ICD-10-CM

## 2017-03-30 DIAGNOSIS — L02.215 PERINEAL ABSCESS: ICD-10-CM

## 2017-03-30 DIAGNOSIS — Z00.00 PHYSICAL EXAM: Primary | ICD-10-CM

## 2017-03-30 DIAGNOSIS — Z86.018 HISTORY OF UTERINE FIBROID: ICD-10-CM

## 2017-03-30 DIAGNOSIS — Z86.59 HISTORY OF DEPRESSION: ICD-10-CM

## 2017-03-30 DIAGNOSIS — R73.9 HYPERGLYCEMIA: ICD-10-CM

## 2017-03-30 DIAGNOSIS — R53.81 MALAISE AND FATIGUE: ICD-10-CM

## 2017-03-30 DIAGNOSIS — L73.2 HYDRADENITIS: ICD-10-CM

## 2017-03-30 DIAGNOSIS — E87.1 HYPONATREMIA: ICD-10-CM

## 2017-03-30 DIAGNOSIS — R78.81 BACTEREMIA DUE TO GRAM-POSITIVE BACTERIA: ICD-10-CM

## 2017-03-30 DIAGNOSIS — R53.83 MALAISE AND FATIGUE: ICD-10-CM

## 2017-03-30 PROBLEM — R50.9 FEVER: Status: RESOLVED | Noted: 2017-01-10 | Resolved: 2017-03-30

## 2017-03-30 PROBLEM — R50.9 FEVER, UNSPECIFIED FEVER CAUSE: Status: RESOLVED | Noted: 2017-01-10 | Resolved: 2017-03-30

## 2017-03-30 PROCEDURE — 96372 THER/PROPH/DIAG INJ SC/IM: CPT | Performed by: INTERNAL MEDICINE

## 2017-03-30 PROCEDURE — 99395 PREV VISIT EST AGE 18-39: CPT | Performed by: INTERNAL MEDICINE

## 2017-03-30 RX ORDER — CYANOCOBALAMIN 1000 UG/ML
1000 INJECTION INTRAMUSCULAR; SUBCUTANEOUS ONCE
Status: COMPLETED | OUTPATIENT
Start: 2017-03-30 | End: 2017-03-30

## 2017-03-30 RX ADMIN — CYANOCOBALAMIN 1000 MCG: 1000 INJECTION INTRAMUSCULAR; SUBCUTANEOUS at 13:31:00

## 2017-03-30 NOTE — PATIENT INSTRUCTIONS
1. Physical exam  Physical exam instruction: Improve diet and exercise, complete fasting labs in the near future and you will be called with results 5-7 days after completed, call with questions. - Cholecalciferol (VITAMIN D3) 79518 units Oral Cap;  Take 1

## 2017-03-30 NOTE — PROGRESS NOTES
HPI:   Beatrice Hernandez is a 43year old female who presents for a complete physical exam.     Patient complains of: Patient presents with:  Physical: no meat, or animal products -vegan diet, weight.   watching her carbs, stable at home, exercises 3 times - DR YARIEL BARBOUR    OTHER SURGICAL HISTORY  1/18/17    Comment Bilateral rectal fistulotomies with placement of bilateral setons and proctoscopy.       Family History   Problem Relation Age of Onset   • Other[other] [OTHER] Other    • Hypertension Father murmurs no S3 no S4   Lung exam: No rales no rhonchi no wheezes  Abdominal exam: Soft nontender nondistended positive bowel sounds are normoactive  Extremities exam: noclubbing no cyanosis no edema  Skin exam: No rashes, acne of the face, the rest of the s fibroid  Cont monitoring and management    12.  Malaise and fatigue  Cont monitoring and management      Discussed improve diet and exercise, complete fasting labs in the near future and patient will be called with results 5-7 days after completed    Fransico Barahona

## 2017-04-02 ENCOUNTER — HOSPITAL ENCOUNTER (INPATIENT)
Facility: HOSPITAL | Age: 37
LOS: 3 days | Discharge: HOME | DRG: 603 | End: 2017-04-05
Attending: EMERGENCY MEDICINE | Admitting: HOSPITALIST
Payer: COMMERCIAL

## 2017-04-02 ENCOUNTER — APPOINTMENT (OUTPATIENT)
Dept: CT IMAGING | Facility: HOSPITAL | Age: 37
DRG: 603 | End: 2017-04-02
Attending: EMERGENCY MEDICINE
Payer: COMMERCIAL

## 2017-04-02 DIAGNOSIS — L03.116 CELLULITIS OF LEFT THIGH: Primary | ICD-10-CM

## 2017-04-02 PROCEDURE — 72193 CT PELVIS W/DYE: CPT

## 2017-04-02 RX ORDER — ACETAMINOPHEN 325 MG/1
650 TABLET ORAL ONCE
Status: COMPLETED | OUTPATIENT
Start: 2017-04-02 | End: 2017-04-02

## 2017-04-02 RX ORDER — HEPARIN SODIUM 5000 [USP'U]/ML
5000 INJECTION, SOLUTION INTRAVENOUS; SUBCUTANEOUS EVERY 8 HOURS
Status: DISCONTINUED | OUTPATIENT
Start: 2017-04-03 | End: 2017-04-05

## 2017-04-02 RX ORDER — MELATONIN
325 2 TIMES DAILY WITH MEALS
Status: DISCONTINUED | OUTPATIENT
Start: 2017-04-03 | End: 2017-04-05

## 2017-04-02 RX ORDER — HYDROMORPHONE HYDROCHLORIDE 1 MG/ML
0.5 INJECTION, SOLUTION INTRAMUSCULAR; INTRAVENOUS; SUBCUTANEOUS ONCE
Status: COMPLETED | OUTPATIENT
Start: 2017-04-02 | End: 2017-04-02

## 2017-04-02 RX ORDER — HYDROMORPHONE HYDROCHLORIDE 1 MG/ML
0.3 INJECTION, SOLUTION INTRAMUSCULAR; INTRAVENOUS; SUBCUTANEOUS EVERY 4 HOURS PRN
Status: DISCONTINUED | OUTPATIENT
Start: 2017-04-02 | End: 2017-04-05

## 2017-04-02 RX ORDER — ACETAMINOPHEN 325 MG/1
650 TABLET ORAL EVERY 6 HOURS PRN
Status: DISCONTINUED | OUTPATIENT
Start: 2017-04-02 | End: 2017-04-05

## 2017-04-02 RX ORDER — ONDANSETRON 2 MG/ML
4 INJECTION INTRAMUSCULAR; INTRAVENOUS EVERY 4 HOURS PRN
Status: DISCONTINUED | OUTPATIENT
Start: 2017-04-02 | End: 2017-04-05

## 2017-04-02 RX ORDER — SODIUM CHLORIDE 9 MG/ML
INJECTION, SOLUTION INTRAVENOUS CONTINUOUS
Status: DISCONTINUED | OUTPATIENT
Start: 2017-04-02 | End: 2017-04-05

## 2017-04-02 RX ORDER — HYDROMORPHONE HYDROCHLORIDE 1 MG/ML
0.5 INJECTION, SOLUTION INTRAMUSCULAR; INTRAVENOUS; SUBCUTANEOUS EVERY 4 HOURS PRN
Status: DISCONTINUED | OUTPATIENT
Start: 2017-04-02 | End: 2017-04-05

## 2017-04-02 NOTE — ED INITIAL ASSESSMENT (HPI)
Pt c/o cellulitis to left hip has been treated for this in [place] with surgery, 2 C tons also c/o URI symptoms

## 2017-04-03 PROCEDURE — 99222 1ST HOSP IP/OBS MODERATE 55: CPT | Performed by: HOSPITALIST

## 2017-04-03 RX ORDER — 0.9 % SODIUM CHLORIDE 0.9 %
VIAL (ML) INJECTION
Status: DISPENSED
Start: 2017-04-03 | End: 2017-04-03

## 2017-04-03 RX ORDER — 0.9 % SODIUM CHLORIDE 0.9 %
VIAL (ML) INJECTION
Status: COMPLETED
Start: 2017-04-03 | End: 2017-04-03

## 2017-04-03 NOTE — BH PROGRESS NOTE
ADMISSION NOTE    43year old female with h/o severe hydradenitis supprativa  presents with redness L upper thigh and abdomen CT reveals small fluid collection R perianal area. Available medical records partially reviewed. Dictation to follow.     Severo Baldy

## 2017-04-03 NOTE — PROGRESS NOTES
[facility] dosing service    Initial Pharmacokinetic Consult for Vancomycin Dosing     Mirian Hampton is a 43year old female admitted on 4/2 who is being treated for cellulitis.   Pharmacy has been asked to dose Vancomycin by Dr. Florin Cristina    She is assess renal function. 4.  Pharmacy will follow and monitor renal function changes, toxicity and efficacy. Pharmacy will continue to follow her. We appreciate the opportunity to assist in her care.     Lucrecia Marley, PharmD  4/2/2017  11:49 PM  [facility]

## 2017-04-03 NOTE — CONSULTS
[facility]    Report of Consultation    Mirian Ogden Patient Status:  Inpatient    1980 MRN [id number]   Location [facility] 5SW/SE Attending Gloria Antunez MD   [facility] Day # 1 PCP DO NEDA Spicer [OTHER] Other    • Hypertension Father    • Diabetes Father    • Lipids Father    • Alcohol and Other Disorders Associated Father    • Substance Abuse Father    • Hypertension Mother    • Thyroid disease Mother    • Cancer Sister      brain tumor      repo height 175.3 cm (5' 9\"), weight 220 lb (99.791 kg), last menstrual period 03/26/2017, SpO2 96 %, not currently breastfeeding. General: Alert, orientated x3. Cooperative. No apparent distress.   Abdomen:  Grossly neg   Perineum  Unchanged  Setons ok obstruction. The appendix is not seen, and suture material is present at the base of the cecum, consistent with the provided history of appendectomy. URINARY BLADDER: No visible calculus or focal wall thickening. PELVIC NODES: No lymphadenopathy.    PELV 3. Trace free pelvic fluid. 4. Retroverted uterus with Essure placement. 5. Status post appendectomy. 6. Lesser incidental findings as above. A preliminary report was issued by the [address] [place] [address] Radiology teleradiology service.  There are no major discrepanc

## 2017-04-03 NOTE — CONSULTS
INFECTIOUS DISEASE CONSULT NOTE    Alvotise Levy Koyanagi Patient Status:  Inpatient    1980 MRN [id number]   Location [facility] 5SW/SE Attending Clemencia Cristina, [address] [facility] Day # 1 PCP J axillary flap. Started on Dapto/merop. Underwent MRI pelvis with finding of perianal fistula with communication with skin surface and abscess. Dischcharged on dapto/ertapenem. Now with fevers up to 102F at home and chills.  On 1/18/17 under bilateral rectal bilateral setons and proctoscopy.      Family History   Problem Relation Age of Onset   • Other[other] [OTHER] Other    • Hypertension Father    • Diabetes Father    • Lipids Father    • Alcohol and Other Disorders Associated Father    • Substance Abuse Fat membranes. EOMI  Neck: No lymphadenopathy. Supple. Respiratory: Clear to auscultation bilaterally. No wheezes. No rhonchi. Cardiovascular: RRR  Abdomen: Soft, nontender, nondistended.    Musculoskeletal: No edema noted  Integument: L lower abdomen, L hi axillary pain.  Admitted 11/8-11 with bilateral buttock cellulitis and abscess.  Was discharged on daptomycin and invanz.  Followed by Dr. Coleen Vines, seen in office and changed to po bactrim and minocycline.  Admitted 12/27 with right buttock cellulitis, ? a improved  - d/w patient, RN, ED    Thank you for allowing me to participate in the care of this patient. Please do not hesitate to call if you have any questions.    I will continue to follow with you and will make further recommendations based on his progr

## 2017-04-03 NOTE — PROGRESS NOTES
[facility] Dosing Service  Antibiotic Dosing    Mirian Wild is a 43year old female for whom pharmacy is dosing Zosyn for treatment of  cellulitis. .  Other antibiotics (Not dosed by pharmacy): none    Allergies: is allergic to latex. Vitals:  B

## 2017-04-03 NOTE — H&P
[facility]    PATIENT'S NAME: Ewelina Martelllazaro   ATTENDING PHYSICIAN: Brenda Coronado MD   PATIENT ACCOUNT#:   [de-identified]    LOCATION:  [address] [place] [address] #:   [id number]       YOB: 1980  ADMISSION DATE: that time. In the emergency room today, she was found to have a white blood cell count of 18.4 with 86% neutrophils. Blood cultures were obtained and are pending at this point. Lactic acid level was 1.5.   In the emergency room at least, she was not febr Temperature was 99.5, pulse 98, respiratory rate 16, blood pressure 127/74, O2 saturation 100% on room air. HEENT:  Normocephalic, atraumatic. Pupils equal, reactive to light. Sclerae anicteric. There is no sinus tenderness.   Posterior pharynx is not approximately 2 x 0.8 x 2.7 cm with mild surrounding skin and subcutaneous soft tissue thickening, which was nonspecific. No other abnormal fluid collection was seen. There was no soft tissue gas.   There was a prior appendectomy, and a tampon was in plac

## 2017-04-03 NOTE — ED PROVIDER NOTES
Patient Seen in: [facility] AND [facility] Emergency Department    History   Patient presents with:  Cellulitis (integumentary, infectious)    Stated Complaint: flu    HPI    Patient presents the emergency department complaining of left anterior upper thigh pain Bilateral rectal fistulotomies with placement of bilateral setons and proctoscopy. Medications :   Cholecalciferol (VITAMIN D3) 80302 units Oral Cap,  Take 10,000 capsules by mouth once a week.    ferrous sulfate 325 (65 FE) MG Oral Tab EC,  Take 1 ta Abdominal: Soft. She exhibits no distension. There is no tenderness. Musculoskeletal:   Left anterior thigh with area of erythema and warmth to tender area. Normal pulses distally. Neurological: She is alert and oriented to person, place, and time. Clinical Impression:  Cellulitis of left thigh  (primary encounter diagnosis)    Disposition:  There is no disposition on file for this visit. Follow-up:  No follow-up provider specified.     Medications Prescribed:  Current Discharge Medication List

## 2017-04-03 NOTE — PROGRESS NOTES
Pt seen and examined. See H and P from Dr Deanna Chen from earlier today. Cont vanco and zosyn. WBC slightly improved. ID to see later today. Cont dilaudid for pain.      Mary Lyons  4/3/2017

## 2017-04-04 PROCEDURE — 99232 SBSQ HOSP IP/OBS MODERATE 35: CPT | Performed by: HOSPITALIST

## 2017-04-04 RX ORDER — POTASSIUM CHLORIDE 20 MEQ/1
40 TABLET, EXTENDED RELEASE ORAL ONCE
Status: COMPLETED | OUTPATIENT
Start: 2017-04-04 | End: 2017-04-04

## 2017-04-04 RX ORDER — DOCUSATE SODIUM 100 MG/1
100 CAPSULE, LIQUID FILLED ORAL 2 TIMES DAILY PRN
Status: DISCONTINUED | OUTPATIENT
Start: 2017-04-04 | End: 2017-04-05

## 2017-04-04 RX ORDER — POLYETHYLENE GLYCOL 3350 17 G/17G
17 POWDER, FOR SOLUTION ORAL DAILY PRN
Status: DISCONTINUED | OUTPATIENT
Start: 2017-04-04 | End: 2017-04-05

## 2017-04-04 RX ORDER — HYDROCODONE BITARTRATE AND ACETAMINOPHEN 5; 325 MG/1; MG/1
1 TABLET ORAL EVERY 6 HOURS PRN
Status: DISCONTINUED | OUTPATIENT
Start: 2017-04-04 | End: 2017-04-05

## 2017-04-04 NOTE — PLAN OF CARE
Problem: Patient/Family Goals  Goal: Patient/Family Long Term Goal  Patient’s Long Term Goal: to return home    Interventions:  - follow POC  - take medication as ordered  - communicate needs/changes with staff  - See additional Care Plan goals for specifi strengthening/mobility  - Encourage toileting schedule   Outcome: Progressing  Calls approprietly

## 2017-04-04 NOTE — PROGRESS NOTES
INFECTIOUS DISEASE PROGRESS NOTE    Mirian Coronado [place] Patient Status:  Inpatient    1980 MRN [id number]   Location [facility] 5SW/SE Attending Jp Lemus, [address] [facility] Day # 2 PCP Mauro Moses DO     Subjective:  Afebrile.  No c currently on specific therapy recently admitted to [address] [place] [address] 10/21 - 10/26 with increasing pain and drainage from right buttocks with associated fevers at home to 101.2. On admission started on vancomycin and Zosyn.  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  S/p I&D 10/23 with cx Staph not aureus and bacteroides not fragilis; also underwent intra-op colonoscopy w

## 2017-04-04 NOTE — PROGRESS NOTES
GS   Feels little better    Erythema consolidating   No fluctuance    Appears to be slowly improving     CPM

## 2017-04-04 NOTE — PLAN OF CARE
Problem: Patient/Family Goals  Goal: Patient/Family Long Term Goal  Patient’s Long Term Goal: to return home    Interventions:  - follow POC  - take medication as ordered  - communicate needs/changes with staff  - See additional Care Plan goals for specifi Assess pain using appropriate pain scale  - Administer analgesics based on type and severity of pain and evaluate response  - Implement non-pharmacological measures as appropriate and evaluate response  - Consider cultural and social influences on pain and

## 2017-04-04 NOTE — PROGRESS NOTES
[facility]    Progress Note    Jermain Corona Patient Status:  Inpatient    1980 MRN [id number]   Location [facility] 5SW/SE Attending Syed Morton, [address] [facility] Day # 2 PCP Melissa Hart,        Subjective: CREATSERUM 0.76 04/04/2017   BUN 5* 04/04/2017    04/04/2017   K 3.8 04/04/2017    04/04/2017   CO2 25 04/04/2017   GLU 94 04/04/2017   CA 8.7 04/04/2017   ALB 3.1* 04/03/2017   ALKPHO 60 04/03/2017   BILT 0.7 04/03/2017   TP 7.0 04/03/2017 hidradenitis suppurative with fistulous formation, status post seton procedures perirectally.  There is a collection of fluid.   - apprec Surg consult     3.      Leukocytosis secondary to #1.  Rule out sepsis.  Cultures have been sent.  The patient does no

## 2017-04-05 VITALS
WEIGHT: 234.63 LBS | SYSTOLIC BLOOD PRESSURE: 109 MMHG | TEMPERATURE: 98 F | BODY MASS INDEX: 34.75 KG/M2 | HEART RATE: 77 BPM | HEIGHT: 69 IN | RESPIRATION RATE: 18 BRPM | DIASTOLIC BLOOD PRESSURE: 61 MMHG | OXYGEN SATURATION: 99 %

## 2017-04-05 PROCEDURE — 99239 HOSP IP/OBS DSCHRG MGMT >30: CPT | Performed by: HOSPITALIST

## 2017-04-05 RX ORDER — CEFADROXIL 500 MG/1
1 CAPSULE ORAL 2 TIMES DAILY
Qty: 40 CAPSULE | Refills: 0 | Status: SHIPPED | OUTPATIENT
Start: 2017-04-05 | End: 2017-04-15

## 2017-04-05 RX ORDER — HYDROCODONE BITARTRATE AND ACETAMINOPHEN 5; 325 MG/1; MG/1
1 TABLET ORAL EVERY 6 HOURS PRN
Qty: 10 TABLET | Refills: 0 | Status: SHIPPED | OUTPATIENT
Start: 2017-04-05 | End: 2017-06-26 | Stop reason: ALTCHOICE

## 2017-04-05 RX ORDER — PSEUDOEPHEDRINE HCL 30 MG
100 TABLET ORAL 2 TIMES DAILY PRN
Qty: 30 CAPSULE | Refills: 0 | Status: SHIPPED | OUTPATIENT
Start: 2017-04-05 | End: 2017-12-11

## 2017-04-05 RX ORDER — 0.9 % SODIUM CHLORIDE 0.9 %
VIAL (ML) INJECTION
Status: COMPLETED
Start: 2017-04-05 | End: 2017-04-05

## 2017-04-05 RX ORDER — SULFAMETHOXAZOLE AND TRIMETHOPRIM 800; 160 MG/1; MG/1
1 TABLET ORAL 2 TIMES DAILY
Qty: 20 TABLET | Refills: 0 | Status: SHIPPED | OUTPATIENT
Start: 2017-04-05 | End: 2017-04-15

## 2017-04-05 NOTE — PROGRESS NOTES
INFECTIOUS DISEASE PROGRESS NOTE    Alvotise Levy Koyanagi Patient Status:  Inpatient    1980 MRN [id number]   Location [facility] 5SW/SE Attending Clemencia Cristina, [address] [facility] Day # 3 PCP Mateus Sebastian DO     Subjective:  Afebrile.  Feel in the past but not currently on specific therapy recently admitted to [address] [place] [address] 10/21 - 10/26 with increasing pain and drainage from right buttocks with associated fevers at home to 101.2. On admission started on vancomycin and Zosyn.  CT pelvis 10/21 with lidia now  # Perianal fistula seen on MRI s/p bulateral rectal fistulotomies 1/18/17  # Previous bilateral buttock cellulitis with abscess w/concern for perianal fistula.  S/p I&D 10/23 with cx Staph not aureus and bacteroides not fragilis; also underwent intra-

## 2017-04-05 NOTE — PROGRESS NOTES
GS    Feels much better    WBC 6.1  Less tender   Less erythema    Rectal area - no drainage  Resolving cellulitis

## 2017-04-05 NOTE — PLAN OF CARE
Problem: Patient/Family Goals  Goal: Patient/Family Long Term Goal  Patient’s Long Term Goal: to return home    Interventions:  - follow POC  - take medication as ordered  - communicate needs/changes with staff  - See additional Care Plan goals for specifi non-pharmacological measures as appropriate and evaluate response  - Consider cultural and social influences on pain and pain management  - Manage/alleviate anxiety  - Utilize distraction and/or relaxation techniques  - Monitor for opioid side effects  - N

## 2017-04-05 NOTE — PLAN OF CARE
Problem: Patient/Family Goals  Goal: Patient/Family Long Term Goal  Patient’s Long Term Goal: to return home    Interventions:  - follow POC  - take medication as ordered  - communicate needs/changes with staff  - See additional Care Plan goals for specifi Outcome: Progressing    Problem: SAFETY ADULT - FALL  Goal: Free from fall injury  INTERVENTIONS:  - Assess pt frequently for physical needs  - Identify cognitive and physical deficits and behaviors that affect risk of falls.   - [place] fall precaution

## 2017-04-06 ENCOUNTER — TELEPHONE (OUTPATIENT)
Dept: INTERNAL MEDICINE UNIT | Facility: HOSPITAL | Age: 37
End: 2017-04-06

## 2017-04-06 NOTE — TELEPHONE ENCOUNTER
Patient discharged from [facility] on April 5, 2017.  Please call patient to schedule hospital follow-up appointment with PCP

## 2017-04-07 NOTE — DISCHARGE SUMMARY
[facility]    PATIENT'S NAME: Herbert Cuellar   ATTENDING PHYSICIAN: Isaias Madrigal MD   PATIENT ACCOUNT#:   [id number]    LOCATION:  [address] [place] [address] RECORD #:   [id number]       YOB: 1980  ADMISSION DATE:       04 FULL CODE. DISCHARGE MEDICATIONS:  Numerous and they include the followin. Norco 5/325 one tablet q.6 h. as needed. 2.   Cefadroxil 1000 mg p.o. b.i.d. Stop if rash.   3.   Ferrous sulfate 325 t.i.d.  4.   Colace 100 mg p.o. b.i.d. p.r.n.  5.

## 2017-04-13 NOTE — PAYOR COMM NOTE
PER IEXCHANGE APPROVED 3 DAYS- AUTH #[id number] - REQUESTING ADDITIONAL DAYS    4/4/17  Subjective:    Mirian Ogden is a(n) 43year old female better today    ROS:    No cp, sob    No c/d    No n/v        Objective:    Blood pressure 111/61, pulse 81 04/04/2017    CL  107  04/04/2017    CO2  25  04/04/2017    GLU  94  04/04/2017    CA  8.7  04/04/2017    ALB  3.1*  04/03/2017    ALKPHO  60  04/03/2017    BILT  0.7  04/03/2017    TP  7.0  04/03/2017    AST  14*  04/03/2017    ALT  12*  04/03/2017    TSH oral abx    - cont pain control       2.      Extensive hidradenitis suppurative with fistulous formation, status post seton procedures perirectally.  There is a collection of fluid.   - apprec Surg consult      3.      Leukocytosis secondary to #1.  Rule o

## 2017-05-28 ENCOUNTER — HOSPITAL ENCOUNTER (EMERGENCY)
Facility: HOSPITAL | Age: 37
Discharge: HOME | End: 2017-05-28
Payer: COMMERCIAL

## 2017-05-28 VITALS
WEIGHT: 220 LBS | HEIGHT: 69 IN | HEART RATE: 72 BPM | TEMPERATURE: 98 F | DIASTOLIC BLOOD PRESSURE: 65 MMHG | RESPIRATION RATE: 16 BRPM | OXYGEN SATURATION: 99 % | BODY MASS INDEX: 32.58 KG/M2 | SYSTOLIC BLOOD PRESSURE: 117 MMHG

## 2017-05-28 DIAGNOSIS — K61.0 ABSCESS, PERIANAL: Primary | ICD-10-CM

## 2017-05-28 PROCEDURE — 99283 EMERGENCY DEPT VISIT LOW MDM: CPT

## 2017-05-28 RX ORDER — HYDROCODONE BITARTRATE AND ACETAMINOPHEN 5; 325 MG/1; MG/1
2 TABLET ORAL ONCE
Status: COMPLETED | OUTPATIENT
Start: 2017-05-28 | End: 2017-05-28

## 2017-05-28 RX ORDER — HYDROCODONE BITARTRATE AND ACETAMINOPHEN 5; 325 MG/1; MG/1
1-2 TABLET ORAL EVERY 4 HOURS PRN
Qty: 15 TABLET | Refills: 0 | Status: SHIPPED | OUTPATIENT
Start: 2017-05-28 | End: 2017-06-04

## 2017-05-28 RX ORDER — AMOXICILLIN AND CLAVULANATE POTASSIUM 875; 125 MG/1; MG/1
1 TABLET, FILM COATED ORAL 2 TIMES DAILY
Qty: 20 TABLET | Refills: 0 | Status: SHIPPED | OUTPATIENT
Start: 2017-05-28 | End: 2017-06-07

## 2017-05-28 RX ORDER — IBUPROFEN 600 MG/1
600 TABLET ORAL ONCE
Status: COMPLETED | OUTPATIENT
Start: 2017-05-28 | End: 2017-05-28

## 2017-05-28 NOTE — ED PROVIDER NOTES
Patient Seen in: [facility] AND [facility] Emergency Department    History   Patient presents with:  Abscess (integumentary)    Stated Complaint: abscess left gluteal area    HPI    Patient presents into the emergency room for evaluation of a possible perianal a under anthesthesia , labial drainage and irrigation with lavage at [facility] - DR YARIEL BARBOUR    OTHER SURGICAL HISTORY  1/18/17    Comment Bilateral rectal fistulotomies with placement of bilateral setons and proctoscopy.        Medications :   Amoxicillin-Pot C Oral   SpO2 05/28/17 1240 99 %   O2 Device 05/28/17 1240 None (Room air)       Current:/74 mmHg  Pulse 85  Temp(Src) 98.3 °F (36.8 °C) (Oral)  Resp 20  Ht 175.3 cm (5' 9\")  Wt 99.791 kg  BMI 32.47 kg/m2  SpO2 99%  LMP 05/20/2017        Physical Exam

## 2017-05-28 NOTE — ED INITIAL ASSESSMENT (HPI)
Pt states she had 2 anal fistulas repaired last January 2017. She had the seton removed 3 days ago at her PMD.  While sitting in her daughter's graduation yesterday she felt pain over the surgical site (left buttock).   She wants to have it checked before

## 2017-05-28 NOTE — ED NOTES
PT safe to DC home per MD. [employer] to dress self. DC teaching done, pt verbalizes understanding. Ambulatory with steady gait to exit.

## 2017-06-14 ENCOUNTER — TELEPHONE (OUTPATIENT)
Dept: INTERNAL MEDICINE CLINIC | Facility: CLINIC | Age: 37
End: 2017-06-14

## 2017-06-14 ENCOUNTER — HOSPITAL ENCOUNTER (INPATIENT)
Facility: HOSPITAL | Age: 37
LOS: 1 days | Discharge: HOME | DRG: 603 | End: 2017-06-15
Attending: HOSPITALIST | Admitting: HOSPITALIST
Payer: COMMERCIAL

## 2017-06-14 DIAGNOSIS — L03.317 CELLULITIS OF RIGHT BUTTOCK: Primary | ICD-10-CM

## 2017-06-14 PROCEDURE — 99220 INITIAL OBSERVATION CARE,LEVL III: CPT | Performed by: HOSPITALIST

## 2017-06-14 RX ORDER — ONDANSETRON 2 MG/ML
4 INJECTION INTRAMUSCULAR; INTRAVENOUS EVERY 6 HOURS PRN
Status: DISCONTINUED | OUTPATIENT
Start: 2017-06-14 | End: 2017-06-15

## 2017-06-14 RX ORDER — BISACODYL 10 MG
10 SUPPOSITORY, RECTAL RECTAL
Status: DISCONTINUED | OUTPATIENT
Start: 2017-06-14 | End: 2017-06-15

## 2017-06-14 RX ORDER — HYDROCODONE BITARTRATE AND ACETAMINOPHEN 5; 325 MG/1; MG/1
2 TABLET ORAL EVERY 4 HOURS PRN
Status: DISCONTINUED | OUTPATIENT
Start: 2017-06-14 | End: 2017-06-15

## 2017-06-14 RX ORDER — HYDROMORPHONE HYDROCHLORIDE 1 MG/ML
0.8 INJECTION, SOLUTION INTRAMUSCULAR; INTRAVENOUS; SUBCUTANEOUS EVERY 2 HOUR PRN
Status: DISCONTINUED | OUTPATIENT
Start: 2017-06-14 | End: 2017-06-15

## 2017-06-14 RX ORDER — HYDROCODONE BITARTRATE AND ACETAMINOPHEN 5; 325 MG/1; MG/1
1 TABLET ORAL EVERY 4 HOURS PRN
Status: DISCONTINUED | OUTPATIENT
Start: 2017-06-14 | End: 2017-06-15

## 2017-06-14 RX ORDER — SODIUM PHOSPHATE, DIBASIC AND SODIUM PHOSPHATE, MONOBASIC 7; 19 G/133ML; G/133ML
1 ENEMA RECTAL ONCE AS NEEDED
Status: DISCONTINUED | OUTPATIENT
Start: 2017-06-14 | End: 2017-06-15

## 2017-06-14 RX ORDER — MORPHINE SULFATE 2 MG/ML
2 INJECTION, SOLUTION INTRAMUSCULAR; INTRAVENOUS ONCE
Status: COMPLETED | OUTPATIENT
Start: 2017-06-14 | End: 2017-06-14

## 2017-06-14 RX ORDER — SODIUM CHLORIDE 9 MG/ML
1000 INJECTION, SOLUTION INTRAVENOUS ONCE
Status: COMPLETED | OUTPATIENT
Start: 2017-06-14 | End: 2017-06-15

## 2017-06-14 RX ORDER — POLYETHYLENE GLYCOL 3350 17 G/17G
17 POWDER, FOR SOLUTION ORAL DAILY PRN
Status: DISCONTINUED | OUTPATIENT
Start: 2017-06-14 | End: 2017-06-15

## 2017-06-14 RX ORDER — HYDROMORPHONE HYDROCHLORIDE 1 MG/ML
0.2 INJECTION, SOLUTION INTRAMUSCULAR; INTRAVENOUS; SUBCUTANEOUS EVERY 2 HOUR PRN
Status: DISCONTINUED | OUTPATIENT
Start: 2017-06-14 | End: 2017-06-15

## 2017-06-14 RX ORDER — HYDROMORPHONE HYDROCHLORIDE 1 MG/ML
0.4 INJECTION, SOLUTION INTRAMUSCULAR; INTRAVENOUS; SUBCUTANEOUS EVERY 2 HOUR PRN
Status: DISCONTINUED | OUTPATIENT
Start: 2017-06-14 | End: 2017-06-15

## 2017-06-14 RX ORDER — ACETAMINOPHEN 325 MG/1
650 TABLET ORAL EVERY 4 HOURS PRN
Status: DISCONTINUED | OUTPATIENT
Start: 2017-06-14 | End: 2017-06-15

## 2017-06-14 RX ORDER — DOCUSATE SODIUM 100 MG/1
100 CAPSULE, LIQUID FILLED ORAL 2 TIMES DAILY
Status: DISCONTINUED | OUTPATIENT
Start: 2017-06-14 | End: 2017-06-15

## 2017-06-14 NOTE — ED PROVIDER NOTES
Patient Seen in: [facility] AND [facility] Emergency Department    History   Patient presents with:  Cellulitis (integumentary, infectious)    Stated Complaint: cellulitise to right thigh    The history is provided by the patient.      43 yof with extensive pmhx and proctoscopy. Medications :   HYDROcodone-acetaminophen 5-325 MG Oral Tab,  Take 1 tablet by mouth every 6 (six) hours as needed. docusate sodium 100 MG Oral Cap,  Take 100 mg by mouth 2 (two) times daily as needed for constipation.    Jennifer Yanez HPI.    Physical Exam       ED Triage Vitals   BP 06/14/17 1410 133/88 mmHg   Pulse 06/14/17 1410 90   Resp 06/14/17 1410 18   Temp 06/14/17 1410 98.5 °F (36.9 °C)   Temp src 06/14/17 1410 Oral   SpO2 06/14/17 1410 100 %   O2 Device 06/14/17 1410 None [facility] (L) 80.0-100.0 fL   MCH 24.0 (L) 27.0-32.0 pg   MCHC 31.5 (L) 32.0-37.0 g/dl   RDW 17.4 (H) 11.0-15.0 %    140-400 K/UL   MPV 8.8 7.4-10.3 fL   Neutrophil % 39 %   Lymphocyte % 44 %   Monocyte % 14 %   Eosinophil % 2 %   Basophil % 1 %   Neutrophil

## 2017-06-14 NOTE — TELEPHONE ENCOUNTER
Please call pt, believes she might have the start of cellulitis, she has had before  Can something be prescribed?   Pt just returned to work this week after being off for 8 months, cannot afford to lose any time at work  Please call to discuss/advise  Pt ca

## 2017-06-14 NOTE — TELEPHONE ENCOUNTER
I spoke to patients mother who answered the phone, she stated that patient is currently at [facility] for evaluation.  Nursing to follow-up

## 2017-06-15 VITALS
TEMPERATURE: 98 F | RESPIRATION RATE: 18 BRPM | HEIGHT: 69 IN | DIASTOLIC BLOOD PRESSURE: 71 MMHG | SYSTOLIC BLOOD PRESSURE: 118 MMHG | WEIGHT: 230.88 LBS | OXYGEN SATURATION: 100 % | BODY MASS INDEX: 34.2 KG/M2 | HEART RATE: 76 BPM

## 2017-06-15 PROCEDURE — 99239 HOSP IP/OBS DSCHRG MGMT >30: CPT | Performed by: HOSPITALIST

## 2017-06-15 RX ORDER — CEFADROXIL 1000 MG/1
1 TABLET ORAL 2 TIMES DAILY
Qty: 10 TABLET | Refills: 0 | Status: SHIPPED | OUTPATIENT
Start: 2017-06-15 | End: 2017-06-25

## 2017-06-15 RX ORDER — SULFAMETHOXAZOLE AND TRIMETHOPRIM 800; 160 MG/1; MG/1
1 TABLET ORAL 2 TIMES DAILY
Qty: 20 TABLET | Refills: 0 | Status: SHIPPED | OUTPATIENT
Start: 2017-06-15 | End: 2017-06-25

## 2017-06-15 NOTE — PROGRESS NOTES
[facility] Note: Antimicrobial Weight Dose Adjustment for: Cefoxitin     Alvotise Regino Posada is a 43year old female who has been prescribed Cefoxitin 1gm IVPB Q8H}. CrCl is estimated creatinine clearance is 102.2 mL/min (based on Cr of 0.78).  and pt has

## 2017-06-15 NOTE — CONSULTS
[facility]    Report of Consultation    Mirian Camejo December Patient Status:  Inpatient    1980 MRN [id number]   Location [facility] 5SW/SE Attending Tiago Dong MD   Hosp Day # 1 PCP Michael Sands, DO     Date o proctoscopy.      Family History   Problem Relation Age of Onset   • Other[other] [OTHER] Other    • Hypertension Father    • Diabetes Father    • Lipids Father    • Alcohol and Other Disorders Associated Father    • Substance Abuse Father    • Hypertension Sunday mornings Disp:  Rfl:  Taking   ferrous sulfate 325 (65 FE) MG Oral Tab EC Take 1 tablet (325 mg total) by mouth 3 (three) times daily with meals.  (Patient taking differently: Take 325 mg by mouth 2 (two) times daily with meals.  ) Disp: 90 tablet Rf continue IV antibiotics  AARON Tang  6/15/2017  10:00 AM

## 2017-06-15 NOTE — PLAN OF CARE
Problem: Patient/Family Goals  Goal: Patient/Family Long Term Goal  Patient’s Long Term Goal: To return Home    Interventions:  - Medication as ordered  - Antibiotic as ordered  - See additional Care Plan goals for specific interventions   Outcome: Adequat Instruct pt to call for assistance with activity based on assessment  - Modify environment to reduce risk of injury  - Provide assistive devices as appropriate  - Consider OT/PT consult to assist with strengthening/mobility  - Encourage toileting schedule

## 2017-06-15 NOTE — DISCHARGE SUMMARY
[facility]    Discharge Summary    Mirian Kramer Patient Status:  Inpatient    1980 MRN [id number]   Location [facility] 5SW/SE Attending Dayana Renteria MD   Hosp Day # 1 PCP Don Vásquez DO     Date of Adm yesterday.  Describes the onset is gradual with progressive worsening with no clear aggravating or relieving factors.  Claims she has had multiple such inflammation of the past with varying degrees of treatment describes her pain at this time as a 3 out of taking these medications       Instructions Prescription details    docusate sodium 100 MG Caps   Last time this was given:  100 mg on 6/14/2017  9:20 PM   Commonly known as:  COLACE        Take 100 mg by mouth 2 (two) times daily as needed for constipatio

## 2017-06-15 NOTE — CONSULTS
INFECTIOUS DISEASE CONSULT NOTE    Alvotise Diedre Holstein Patient Status:  Inpatient    1980 MRN [id number]   Location [facility] 5SW/SE Attending Marcus Pelletier MD   [facility] Day # 1 PCP Sourav Feng MAIN OR    SKIN SURGERY      Comment rt axilla flap, groin reconstruction    APPENDECTOMY      CORRECT BUNION,OTHR METHODS      NEEDLE BIOPSY LEFT      ARTHROSCOPY OF JOINT UNLISTED Left 2010    Comment meniscal repair    DRAIN PILONIDAL CYST COMPLIC  90/2 (DULCOLAX) rectal suppository 10 mg, 10 mg, Rectal, Daily PRN  •  FLEET ENEMA (FLEET) 7-19 GM/118ML enema 133 mL, 1 enema, Rectal, Once PRN  •  ondansetron HCl (ZOFRAN) injection 4 mg, 4 mg, Intravenous, Q6H PRN    Review of Systems:    A comprehensive 10 surgery and concern for possible recurrent abscess. On admission afebrile with WBC 4.4. Started on cefoxitin with now neutropenia.      # Acute R buttock cellulitis with rapid improvement w/o signs of abscess  # Recent L hip and LLE cellulitis with fevers a

## 2017-06-15 NOTE — PLAN OF CARE
Problem: Patient/Family Goals  Goal: Patient/Family Long Term Goal  Patient’s Long Term Goal: To return Home    Interventions:  - Medication as ordered  - Antibiotic as ordered  - See additional Care Plan goals for specific interventions   Outcome: Not Pro strengthening/mobility  - Encourage toileting schedule  Outcome: Progressing    Problem: DISCHARGE PLANNING  Goal: Discharge to home or other facility with appropriate resources  INTERVENTIONS:  - Identify barriers to discharge w/pt and caregiver  - Includ

## 2017-06-15 NOTE — H&P
[address] [facility] [address] Patient Status:  Observation    1980 MRN [id number]   Location [facility] 5SW/SE Attending Enmanuel Dumont MD   Hosp Day # 0 PCP Karime Voss,      Date:   Comment Procedure: COLONOSCOPY;  Surgeon: Mireille Rm MD;  Location: [address] [place] [address] OR    SKIN SURGERY      Comment rt axilla flap, groin reconstruction    APPENDECTOMY      CORRECT BUNION,OTHR METHODS      NEEDLE BIOPSY LEFT      ARTHROSCOPY OF JOINT UNLISTED Negative. Ear/Nose/Mouth/Throat:  Negative. Respiratory:  Negative  Cardiovascular: Negative  Gastrointestinal:  Negative. Genitourinary:  Negative  Endocrine:  Negative. Immunologic:  Negative. Musculoskeletal:  Negative. Integumentary:  Negative. 06/14/2017   CO2 24 06/14/2017   GLU 88 06/14/2017   CA 9.1 06/14/2017   ESRML 30 06/14/2017       Imaging:  No exam resulted this encounter.     Assessment and Plan:    Right gluteal cellulitis  Surgery has been consulted, patient started on Mefoxin 2 g IV

## 2017-06-16 ENCOUNTER — LAB ENCOUNTER (OUTPATIENT)
Dept: LAB | Facility: HOSPITAL | Age: 37
End: 2017-06-16
Attending: HOSPITALIST
Payer: COMMERCIAL

## 2017-06-16 ENCOUNTER — TELEPHONE (OUTPATIENT)
Dept: INTERNAL MEDICINE UNIT | Facility: HOSPITAL | Age: 37
End: 2017-06-16

## 2017-06-16 DIAGNOSIS — L03.317 CELLULITIS OF RIGHT BUTTOCK: ICD-10-CM

## 2017-06-16 PROCEDURE — 36415 COLL VENOUS BLD VENIPUNCTURE: CPT

## 2017-06-16 PROCEDURE — 85025 COMPLETE CBC W/AUTO DIFF WBC: CPT

## 2017-06-16 NOTE — TELEPHONE ENCOUNTER
Please schedule patient for a hospital follow up appointment with pcp . Pt was discharged on 6/15/17.

## 2017-06-16 NOTE — TELEPHONE ENCOUNTER
Note:  Per Kelli Manzano, due to pt age not eligible for TCM  Pt contacted, looking for follow up on Monday or Thursday  Can pt be added to Dr Coral Harden schedule?   Tasked to nursing

## 2017-06-19 NOTE — TELEPHONE ENCOUNTER
Called patient and relayed DR. RED message - transferred to [facility] to schedule F/U for Monday 6/26 with

## 2017-06-26 ENCOUNTER — OFFICE VISIT (OUTPATIENT)
Dept: INTERNAL MEDICINE CLINIC | Facility: CLINIC | Age: 37
End: 2017-06-26

## 2017-06-26 ENCOUNTER — LAB ENCOUNTER (OUTPATIENT)
Dept: LAB | Age: 37
End: 2017-06-26
Attending: INTERNAL MEDICINE
Payer: COMMERCIAL

## 2017-06-26 ENCOUNTER — TELEPHONE (OUTPATIENT)
Dept: INTERNAL MEDICINE CLINIC | Facility: CLINIC | Age: 37
End: 2017-06-26

## 2017-06-26 VITALS
SYSTOLIC BLOOD PRESSURE: 126 MMHG | TEMPERATURE: 99 F | HEIGHT: 69.5 IN | DIASTOLIC BLOOD PRESSURE: 82 MMHG | BODY MASS INDEX: 33.36 KG/M2 | HEART RATE: 89 BPM | OXYGEN SATURATION: 99 % | WEIGHT: 230.38 LBS

## 2017-06-26 DIAGNOSIS — L73.2 HYDRADENITIS: ICD-10-CM

## 2017-06-26 DIAGNOSIS — D72.9 ABNORMAL WHITE BLOOD CELL COUNT: ICD-10-CM

## 2017-06-26 DIAGNOSIS — L03.317 CELLULITIS OF BUTTOCK: Primary | ICD-10-CM

## 2017-06-26 DIAGNOSIS — R78.81 BACTEREMIA DUE TO GRAM-POSITIVE BACTERIA: ICD-10-CM

## 2017-06-26 DIAGNOSIS — R92.8 ABNORMAL MAMMOGRAM: ICD-10-CM

## 2017-06-26 PROCEDURE — 99496 TRANSJ CARE MGMT HIGH F2F 7D: CPT | Performed by: INTERNAL MEDICINE

## 2017-06-26 PROCEDURE — 85025 COMPLETE CBC W/AUTO DIFF WBC: CPT

## 2017-06-26 PROCEDURE — 36415 COLL VENOUS BLD VENIPUNCTURE: CPT

## 2017-06-26 RX ORDER — SULFAMETHOXAZOLE AND TRIMETHOPRIM 800; 160 MG/1; MG/1
1 TABLET ORAL 2 TIMES DAILY
COMMUNITY
End: 2017-07-10 | Stop reason: ALTCHOICE

## 2017-06-26 RX ORDER — CEFADROXIL 500 MG/1
500 CAPSULE ORAL 2 TIMES DAILY
COMMUNITY
Start: 2017-06-15 | End: 2017-07-10 | Stop reason: ALTCHOICE

## 2017-06-26 NOTE — TELEPHONE ENCOUNTER
Please advise - called Best Goodman from [employer] - he wants last office note and ED note faxed - is it ok to fax ED note also ? - to DR. RED

## 2017-06-26 NOTE — PATIENT INSTRUCTIONS
1. Cellulitis of buttock  Cont and complete the abx and as the ID guys about long term meds, call me if off the meds things seem to recur    2. Bacteremia due to Gram-positive bacteria  Cont to monitor    3.  Hydradenitis  Continue current treatment, and fo

## 2017-06-26 NOTE — TELEPHONE ENCOUNTER
Michaela Crisostomo from [employer]uthrie is calling to get office notes for 06/19 and on   Ph. # [phone]   Fax.  # [phone]   Attn: [id number]      Routed to clinical

## 2017-06-26 NOTE — PROGRESS NOTES
HPI:    Rogerio Bell is a 43year old female here today for hospital follow up. She was discharged from Inpatient hospital, [facility]  to [address] [place] [address] Date: 6/14/17  Discharge Date: 6/15/17  Hospital Discharge Diagnosis:    1.  Cellulitis 6/14-6/15 DX: right buttocks cellulitis. Taking two oral antibiotics, dr Rosalinda Doan did see her, did have a different area of infection other than the prev sug site      Allergies:  She is allergic to cefoxitin and latex.     Current Meds:    Current Outpatient Chills, fatigue, fever, malaise, weight gain and weight loss. ENMT: Negative for Nasal drainage and sinus pressure. Eyes: Negative for Vision changes. Respiratory: Negative for Cough, dyspnea and wheezing.   Cardio: Negative Chest pain and irregular hear erythema and induration, no fluctuance, no rashes  Neurological exam: Cranial nerves II through XII intact, no gross deficits  Musculoskeletal exam:no Arthritis appreciated, no obvious deformity  : deferred to urology    ASSESSMENT/ PLAN:   Diagnoses and

## 2017-06-27 NOTE — TELEPHONE ENCOUNTER
Requested notes faxed. Noted still need \"attending physician statement\" in paperwork on Dr. Benton Corley desk.  Do we need to also fax this when complete, Dr. Mignon Catalan?

## 2017-06-27 NOTE — TELEPHONE ENCOUNTER
Okay with me, nursing please fax my latest note, patient has given us permission to communicate with her work human resources department. See paperwork in my out box for full details.

## 2017-07-06 ENCOUNTER — TELEPHONE (OUTPATIENT)
Dept: INTERNAL MEDICINE CLINIC | Facility: CLINIC | Age: 37
End: 2017-07-06

## 2017-07-06 NOTE — TELEPHONE ENCOUNTER
Pt is calling she returned to work on 6/30 after being off 8 months, since then pt's legs & feet have started swelling they are so bad she can't wear her normal shoes   Also, pt is experiencing a lot of pain in her bottom from the surgical area, she did fi

## 2017-07-06 NOTE — TELEPHONE ENCOUNTER
Called patient and relayed DR. RED message - verbalized understanding. Rn also explained to patient that no fax received yet so far.  Transferred to [facility] to schedule appointment for patient

## 2017-07-06 NOTE — TELEPHONE ENCOUNTER
Needs to be seen with any issues, when she calls try to get her in with me or first available, any paperwork has to be done in person, nrusing call her

## 2017-07-10 ENCOUNTER — OFFICE VISIT (OUTPATIENT)
Dept: INTERNAL MEDICINE CLINIC | Facility: CLINIC | Age: 37
End: 2017-07-10

## 2017-07-10 VITALS
OXYGEN SATURATION: 98 % | BODY MASS INDEX: 33.81 KG/M2 | SYSTOLIC BLOOD PRESSURE: 108 MMHG | HEIGHT: 69.5 IN | WEIGHT: 233.5 LBS | DIASTOLIC BLOOD PRESSURE: 78 MMHG | HEART RATE: 80 BPM | TEMPERATURE: 99 F

## 2017-07-10 DIAGNOSIS — R60.0 LEG EDEMA: ICD-10-CM

## 2017-07-10 DIAGNOSIS — H60.10: ICD-10-CM

## 2017-07-10 DIAGNOSIS — L03.317 CELLULITIS OF BUTTOCK: Primary | ICD-10-CM

## 2017-07-10 PROCEDURE — 99214 OFFICE O/P EST MOD 30 MIN: CPT | Performed by: INTERNAL MEDICINE

## 2017-07-10 PROCEDURE — 99212 OFFICE O/P EST SF 10 MIN: CPT | Performed by: INTERNAL MEDICINE

## 2017-07-10 RX ORDER — SULFAMETHOXAZOLE AND TRIMETHOPRIM 800; 160 MG/1; MG/1
1 TABLET ORAL 2 TIMES DAILY
Qty: 20 TABLET | Refills: 0 | Status: SHIPPED | OUTPATIENT
Start: 2017-07-10 | End: 2017-12-11 | Stop reason: ALTCHOICE

## 2017-07-10 RX ORDER — FUROSEMIDE 20 MG/1
20 TABLET ORAL DAILY
Qty: 30 TABLET | Refills: 1 | Status: SHIPPED | OUTPATIENT
Start: 2017-07-10 | End: 2018-01-11

## 2017-07-10 NOTE — PATIENT INSTRUCTIONS
1. Cellulitis of buttock  Cont meds and see dr Cait Fry the infectious disease doc  You may now need supressive abx, talk to them  - Sulfamethoxazole-TMP -160 MG Oral Tab per tablet; Take 1 tablet by mouth 2 (two) times daily. Dispense: 20 tablet;  Re

## 2017-07-10 NOTE — PROGRESS NOTES
HPI:   Joss Santiago is a 43year old female who presents for complains of: Patient presents with:  Leg Pain: left leg. Onset:Thursday. Swelling present, but better over last couple of days.  Denies any physical injury, limping while walking  Pain: lef buttock pain. Patient doing sitzs baths and noticed yellow drainage. she is s/p anal surgery: anal sphincter repair done January. Denies fever or chills).     1. Cellulitis of buttock  Cont meds and see dr Denny Nelson the infectious disease doc  You may now nee

## 2017-07-11 NOTE — TELEPHONE ENCOUNTER
Dr Karri Pop completed paperwork (in his outbox)  Called patient and left message on voicemail.  Notified her that paperwork was completed and asked that she call us back to inform us if she would like it to be faxed or mailed or if she would like to pick it

## 2017-07-12 NOTE — TELEPHONE ENCOUNTER
Pt. Called and left a voicemail stating to please fax the completed paperwork to the 800 number provided on the forms.

## 2017-07-18 NOTE — TELEPHONE ENCOUNTER
Pt calling the forms that were faxed are incomplete  Question 6 needs to state how long must she stand before she sits again  Question 7 needs to have provided frequency & duration of the time off of work needed  Please complete the info & refax, next to q

## 2017-07-18 NOTE — TELEPHONE ENCOUNTER
Please see patient's message below  Forms were faxed back to us and changes need to be initialed and dated

## 2017-07-18 NOTE — TELEPHONE ENCOUNTER
Per Dr Anum Russell recommendation-called patient to clarify what was missing from form  Reviewed with patient that we added information to question #6 as requested. Patient verbalized understanding.   Reviewed with patient that it was unclear what needed to b

## 2017-08-07 NOTE — ED INITIAL ASSESSMENT (HPI)
Problem: Patient Care Overview (Adult)  Goal: Plan of Care Review  Outcome: Ongoing (interventions implemented as appropriate)    08/06/17 3222   Coping/Psychosocial Response Interventions   Plan Of Care Reviewed With patient   Outcome Evaluation   Outcome Summary/Follow up Plan Patient remains disoriented to time and mildly confused; Patient reported a headache, which was relieved by Tylenol; Patient experiencing nausea & vomiting not relieved by Zofran; Stroke Scale remains 1-2   Patient Care Overview   Progress progress toward functional goals as expected       Goal: Adult Individualization and Mutuality  Outcome: Ongoing (interventions implemented as appropriate)  Goal: Discharge Needs Assessment  Outcome: Ongoing (interventions implemented as appropriate)    Problem: Stroke (Ischemic) (Adult)  Goal: Signs and Symptoms of Listed Potential Problems Will be Absent or Manageable (Stroke)  Outcome: Ongoing (interventions implemented as appropriate)    Problem: Fall Risk (Adult)  Goal: Identify Related Risk Factors and Signs and Symptoms  Outcome: Ongoing (interventions implemented as appropriate)  Goal: Absence of Falls  Outcome: Ongoing (interventions implemented as appropriate)    Problem: Pressure Ulcer Risk (Norberto Scale) (Adult,Obstetrics,Pediatric)  Goal: Identify Related Risk Factors and Signs and Symptoms  Outcome: Ongoing (interventions implemented as appropriate)  Goal: Skin Integrity  Outcome: Ongoing (interventions implemented as appropriate)       Pt has an anal fistula that needs repair and surgery is planned for 1/18 by Dr Ricarda Rodas. Jodi Gonzalez is her infectious disease md.  Pt awoke with 100.4 temp and home health nurse saeid blood, cultures, and temp increased later to 102. 1.   Tylenol taken at 10am. Af

## 2017-08-09 ENCOUNTER — TELEPHONE (OUTPATIENT)
Dept: INTERNAL MEDICINE CLINIC | Facility: CLINIC | Age: 37
End: 2017-08-09

## 2017-08-09 NOTE — TELEPHONE ENCOUNTER
[facility] calling regarding Furosemide 20 mg, they do not have the 20 mg, can 40 mg be substituted  Tasked to rx

## 2017-08-21 ENCOUNTER — OFFICE VISIT (OUTPATIENT)
Dept: INTERNAL MEDICINE CLINIC | Facility: CLINIC | Age: 37
End: 2017-08-21

## 2017-08-21 VITALS
HEART RATE: 83 BPM | WEIGHT: 232 LBS | TEMPERATURE: 99 F | SYSTOLIC BLOOD PRESSURE: 120 MMHG | HEIGHT: 69.5 IN | BODY MASS INDEX: 33.59 KG/M2 | DIASTOLIC BLOOD PRESSURE: 70 MMHG | OXYGEN SATURATION: 99 %

## 2017-08-21 DIAGNOSIS — N30.00 ACUTE CYSTITIS WITHOUT HEMATURIA: Primary | ICD-10-CM

## 2017-08-21 DIAGNOSIS — L73.2 HIDRADENITIS SUPPURATIVA: ICD-10-CM

## 2017-08-21 DIAGNOSIS — R60.0 BILATERAL LOWER EXTREMITY EDEMA: ICD-10-CM

## 2017-08-21 LAB
APPEARANCE: CLEAR
MULTISTIX LOT#: NORMAL NUMERIC
URINE-COLOR: YELLOW

## 2017-08-21 PROCEDURE — 81002 URINALYSIS NONAUTO W/O SCOPE: CPT | Performed by: INTERNAL MEDICINE

## 2017-08-21 PROCEDURE — 99212 OFFICE O/P EST SF 10 MIN: CPT | Performed by: INTERNAL MEDICINE

## 2017-08-21 PROCEDURE — 99214 OFFICE O/P EST MOD 30 MIN: CPT | Performed by: INTERNAL MEDICINE

## 2017-08-21 RX ORDER — CEFADROXIL 500 MG/1
1 CAPSULE ORAL 2 TIMES DAILY
COMMUNITY
End: 2017-11-28

## 2017-08-21 NOTE — PROGRESS NOTES
Malena White is a 43year old female.     HPI:   Patient presents with:  Back Pain  Urinary Frequency      44 y/o F with 1 d h/o +polyuria; +right flank pain; no F/C; no hematuria      HISTORY:  Past Medical History:   Diagnosis Date   • Abscess     2 status: Never Smoker                                                              Smokeless tobacco: Never Used                      Alcohol use:  No                 Medications (Active prior to today's visit):    Current Outpatient Prescriptions:  Cefadrox murmur  Respiratory: lungs without crackles or wheezes  Abdomen: normoactive bowel sounds, soft, non-tender and non-distended  Extremities: no clubbing, cyanosis or edema           ASSESSMENT/PLAN:   Dysuria  UA dipstick completely normal; check urine cult

## 2017-08-30 ENCOUNTER — TELEPHONE (OUTPATIENT)
Dept: INTERNAL MEDICINE CLINIC | Facility: CLINIC | Age: 37
End: 2017-08-30

## 2017-08-30 NOTE — TELEPHONE ENCOUNTER
Patient completed antibiotics given by Dr Bernadette Hall and she is still having the same problem   Would like to speak with nurse - transferred to triage

## 2017-08-30 NOTE — TELEPHONE ENCOUNTER
Pt states right flank pain started when she started cefadroxil 1 gm po BID, and Bactrim; she stopped Bactrim on 8/21 and dysuria resolved; consider hold cefadroxil for now; if pain resolves after 3d, advise call Dr Alysia Kahn Re: dose adjustment or alternati

## 2017-08-30 NOTE — TELEPHONE ENCOUNTER
Please advise - patient called - still has flank pain , not any better , she is  already on antibiotics and wants to know what she should do - to DR. GUILLEN

## 2017-11-25 ENCOUNTER — APPOINTMENT (OUTPATIENT)
Dept: CT IMAGING | Facility: HOSPITAL | Age: 37
DRG: 603 | End: 2017-11-25
Attending: EMERGENCY MEDICINE
Payer: COMMERCIAL

## 2017-11-25 ENCOUNTER — HOSPITAL ENCOUNTER (INPATIENT)
Facility: HOSPITAL | Age: 37
LOS: 3 days | Discharge: HOME | DRG: 603 | End: 2017-11-28
Attending: EMERGENCY MEDICINE | Admitting: HOSPITALIST
Payer: COMMERCIAL

## 2017-11-25 DIAGNOSIS — L03.311 CELLULITIS OF ABDOMINAL WALL: Primary | ICD-10-CM

## 2017-11-25 DIAGNOSIS — L02.91 ABSCESS: ICD-10-CM

## 2017-11-25 PROCEDURE — 72193 CT PELVIS W/DYE: CPT | Performed by: EMERGENCY MEDICINE

## 2017-11-25 PROCEDURE — 99223 1ST HOSP IP/OBS HIGH 75: CPT | Performed by: HOSPITALIST

## 2017-11-25 RX ORDER — ACETAMINOPHEN 500 MG
TABLET ORAL
Status: COMPLETED
Start: 2017-11-25 | End: 2017-11-25

## 2017-11-25 RX ORDER — HYDROMORPHONE HYDROCHLORIDE 1 MG/ML
1 INJECTION, SOLUTION INTRAMUSCULAR; INTRAVENOUS; SUBCUTANEOUS EVERY 2 HOUR PRN
Status: DISCONTINUED | OUTPATIENT
Start: 2017-11-25 | End: 2017-11-28

## 2017-11-25 RX ORDER — SODIUM CHLORIDE AND POTASSIUM CHLORIDE .9; .15 G/100ML; G/100ML
SOLUTION INTRAVENOUS CONTINUOUS
Status: DISCONTINUED | OUTPATIENT
Start: 2017-11-25 | End: 2017-11-28

## 2017-11-25 RX ORDER — DOCUSATE SODIUM 100 MG/1
100 CAPSULE, LIQUID FILLED ORAL 2 TIMES DAILY PRN
Status: DISCONTINUED | OUTPATIENT
Start: 2017-11-25 | End: 2017-11-28

## 2017-11-25 RX ORDER — HYDROMORPHONE HYDROCHLORIDE 1 MG/ML
0.5 INJECTION, SOLUTION INTRAMUSCULAR; INTRAVENOUS; SUBCUTANEOUS EVERY 2 HOUR PRN
Status: DISCONTINUED | OUTPATIENT
Start: 2017-11-25 | End: 2017-11-28

## 2017-11-25 RX ORDER — ENOXAPARIN SODIUM 100 MG/ML
40 INJECTION SUBCUTANEOUS DAILY
Status: DISCONTINUED | OUTPATIENT
Start: 2017-11-26 | End: 2017-11-28

## 2017-11-25 RX ORDER — MORPHINE SULFATE 4 MG/ML
4 INJECTION, SOLUTION INTRAMUSCULAR; INTRAVENOUS ONCE
Status: COMPLETED | OUTPATIENT
Start: 2017-11-25 | End: 2017-11-25

## 2017-11-25 RX ORDER — ONDANSETRON 2 MG/ML
4 INJECTION INTRAMUSCULAR; INTRAVENOUS EVERY 6 HOURS PRN
Status: DISCONTINUED | OUTPATIENT
Start: 2017-11-25 | End: 2017-11-28

## 2017-11-25 RX ORDER — HYDROCODONE BITARTRATE AND ACETAMINOPHEN 5; 325 MG/1; MG/1
1 TABLET ORAL EVERY 6 HOURS PRN
COMMUNITY
End: 2017-11-28

## 2017-11-25 RX ORDER — HYDROCODONE BITARTRATE AND ACETAMINOPHEN 10; 325 MG/1; MG/1
1 TABLET ORAL EVERY 4 HOURS PRN
Status: DISCONTINUED | OUTPATIENT
Start: 2017-11-25 | End: 2017-11-28

## 2017-11-25 RX ORDER — SODIUM CHLORIDE 9 MG/ML
125 INJECTION, SOLUTION INTRAVENOUS CONTINUOUS
Status: DISCONTINUED | OUTPATIENT
Start: 2017-11-25 | End: 2017-11-26

## 2017-11-25 RX ORDER — ACETAMINOPHEN 500 MG
1000 TABLET ORAL ONCE
Status: COMPLETED | OUTPATIENT
Start: 2017-11-25 | End: 2017-11-25

## 2017-11-25 RX ORDER — MELATONIN
325
Status: DISCONTINUED | OUTPATIENT
Start: 2017-11-26 | End: 2017-11-28

## 2017-11-25 RX ORDER — FAMOTIDINE 10 MG/ML
20 INJECTION, SOLUTION INTRAVENOUS 2 TIMES DAILY
Status: DISCONTINUED | OUTPATIENT
Start: 2017-11-25 | End: 2017-11-28

## 2017-11-25 RX ORDER — MORPHINE SULFATE 4 MG/ML
INJECTION, SOLUTION INTRAMUSCULAR; INTRAVENOUS
Status: COMPLETED
Start: 2017-11-25 | End: 2017-11-25

## 2017-11-25 NOTE — ED INITIAL ASSESSMENT (HPI)
S/p scar revision several wks ago, sts the area is draining and has increased pain. sts \" I have cellulitis\"  Has been seen by pmd and given pain medication.

## 2017-11-25 NOTE — ED PROVIDER NOTES
Patient Seen in: [facility] Emergency Department    History   Patient presents with:  Cellulitis (integumentary, infectious)    Stated Complaint: cellulits abdomen/fever    HPI    27-year-old female patient presents complaining of infection to her irrigation with lavage at [facility] - DR YARIEL BARBOUR  1/18/17: OTHER SURGICAL HISTORY      Comment: Bilateral rectal fistulotomies with placement                of bilateral setons and proctoscopy.   No date: SKIN SURGERY      Comment: rt axilla flap, groin recon Abnormal; Notable for the following:     HGB 9.7 (*)     HCT 31.0 (*)     MCV 72.2 (*)     MCH 22.7 (*)     MCHC 31.4 (*)     RDW 18.0 (*)     Neutrophil Absolute 8.6 (*)     All other components within normal limits   EM POCT PREGNANCY URINE - Normal   C

## 2017-11-26 PROCEDURE — 99233 SBSQ HOSP IP/OBS HIGH 50: CPT | Performed by: HOSPITALIST

## 2017-11-26 RX ORDER — 0.9 % SODIUM CHLORIDE 0.9 %
VIAL (ML) INJECTION
Status: COMPLETED
Start: 2017-11-26 | End: 2017-11-26

## 2017-11-26 NOTE — CONSULTS
[facility]    Report of Consultation    Mirian Mays Patient Status:  Inpatient    1980 MRN [id number]   Location [facility] 5SW/SE Attending Vinita Watts, [address] [facility] Day # 1 PCP Regis Stark, DO     Date lavage at [facility] - DR YARIEL BARBOUR  1/18/17: OTHER SURGICAL HISTORY      Comment: Bilateral rectal fistulotomies with placement                of bilateral setons and proctoscopy.   No date: SKIN SURGERY      Comment: rt axilla flap, groin reconstruction  No da Admission:  HYDROcodone-acetaminophen 5-325 MG Oral Tab Take 1 tablet by mouth every 6 (six) hours as needed for Pain. Disp:  Rfl:  11/22/2017 at Unknown time   Cholecalciferol (VITAMIN D3) 30862 units Oral Cap Take 1 capsule by mouth every 7 days.  Take on WBC 11.0 11/26/2017   HGB 9.8 (L) 11/26/2017   HCT 31.8 (L) 11/26/2017    11/26/2017   CREATSERUM 0.86 11/26/2017   BUN 4 (L) 11/26/2017    11/26/2017   K 3.7 11/26/2017    11/26/2017   CO2 22 11/26/2017   GLU 75 11/26/2017   CA 8.7 11 fat. A focus of gas is present in the suprapubic soft tissues (series 2 image 65) where there is an ill-defined fluid pocket (series 4 image 22). The fluid  pocket measures up to approximately 1.7 cm maximum dimension. . Scarring present in the lower suprap evaluation    Stanislaw Rios  11/26/2017  8:26 AM

## 2017-11-26 NOTE — PROGRESS NOTES
[address] [facility] dosing service    Initial Pharmacokinetic Consult for Vancomycin Dosing     Mirian Hampton is a 43year old female admitted on 11/25 who is being treated for cellulitis.   Pharmacy has been asked to dose Vancomycin by Dr. Kathy Peters    She is all will need BUN/Scr daily while on Vancomycin to assess renal function. 4.  Pharmacy will follow and monitor renal function changes, toxicity and efficacy. Pharmacy will continue to follow her. We appreciate the opportunity to assist in her care.     E

## 2017-11-26 NOTE — PROGRESS NOTES
[facility] Pharmacy Note: Antimicrobial Weight Dose Adjustment for: Zosyn (piperacillin/tazobactam)    Mirian Merino is a 43year old female who has been prescribed Zosyn (piperacillin/tazobactam) 3.375 gm every 8 hours.   CrCl is estimated creatinine clearan

## 2017-11-26 NOTE — PLAN OF CARE
Problem: Patient/Family Goals  Goal: Patient/Family Long Term Goal  Patient's Long Term Goal: resolve infection    Interventions:  -dressing changes  -IV vancomycin and zosyn  - See additional Care Plan goals for specific interventions   Outcome: Progressi fluid. Packed with gauze and covered with ABD pad. Received PRN dilaudid for pain. Received scheduled pepcid IV. Pt reported effectiveness of medication. Resting in bed. IV Zosyn infusing. No adverse reactions observed. Call light within reach.  Discussed p

## 2017-11-26 NOTE — CONSULTS
INFECTIOUS DISEASE CONSULT NOTE    Mirian Esquivel Patient Status:  Inpatient    1980 MRN [id number]   Location [facility] 5SW/SE Attending Adi Cardenas, [address] [facility] Day # 1 PCP J management.   History:  Past Medical History:   Diagnosis Date   • Abscess     2016 left buttocks   • Autoimmune disorder ([facility] [address] 2014    hydradentitis suppurativa   • Blood in stool    • Bunion, right foot    • Cellulitis     right thigh 2015   • Cellulitis Comment:Neutropenia  Latex                   Anaphylaxis    Medications:    Current Facility-Administered Medications:   •  Piperacillin Sod-Tazobactam So (ZOSYN) 4.5 g in dextrose 5 % 100 mL ADD-vantage, 4.5 g, Intravenous, Once  •  docusate sodium (COLAC posterior buttock regions which are healing. There is a small opening in the right inguinal groin region with a small drain site in the left groin region which is scabbed.   There is small amount of seepage noted from the right groin region into the perine the visualized bowel. No extraluminal gas. Trace free fluid in the pelvis, likely physiologic. VASCULAR:             No acute vascular abnormality.  LYMPH NODES:            A few iliac chain lymph nodes are present, the largest on the right measures up to 8 note that necrotizing fasciitis remains a clinical diagnosis. A few mildly prominent pelvic lymph nodes are favored to be reactive.     Imaging results reviewed     Impression:  Patient Active Problem List:     Migraine     Anemia     Malaise and fatigue

## 2017-11-26 NOTE — H&P
[facility] Patient Status:  Emergency    1980 MRN [id number]   Location [address] Attending Melissa Lawson MD   Hosp Day # 0 PCP Lizbeth Rojas DO SURGICAL HISTORY      Comment: Exam under anthesthesia , labial drainage and                irrigation with lavage at [facility] - DR YARIEL BARBOUR  1/18/17: OTHER SURGICAL HISTORY      Comment: Bilateral rectal fistulotomies with placement                of bilate Negative. Ear/Nose/Mouth/Throat:  Negative. Respiratory:  Negative  Cardiovascular: Negative  Gastrointestinal:  Negative. Genitourinary:  Negative  Endocrine:  Negative. Immunologic:  Negative. Musculoskeletal:  Negative.   Integumentary: Redness and 9.1 11/25/2017       Imaging:  No exam resulted this encounter. Assessment and Plan:    Lower abdominal wall cellulitis  Started on vancomycin and Zosyn, cultures pending at this time. Will use morphine as needed for pain.   ID and general surgery have

## 2017-11-26 NOTE — PROGRESS NOTES
Zosyn (piperacillin/tazobactam) 3.375g IV once was ordered for Mirian Esquivel. Body mass index is 33.96 kg/m².   Wt Readings from Last 6 Encounters:  11/25/17 : 229 lb 15 oz  08/21/17 : 232 lb  07/10/17 : 233 lb 8 oz  06/26/17 : 230 lb 6.4 oz  06/14/

## 2017-11-26 NOTE — PROGRESS NOTES
[place] [facility] - [facility]    Progress Note    Malena Laleo Patient Status:  Inpatient    1980 MRN [id number]   Location [facility] 5SW/SE Attending Derrick Becerra, [address] [facility] Day # 1 PCP Michael Sands DO       Subjective: (ZOSYN) 4.5 g in dextrose 5 % 100 mL ADD-vantage 4.5 g Intravenous Q8H         Lab Results  Component Value Date   WBC 11.0 11/26/2017   HGB 9.8 (L) 11/26/2017   HCT 31.8 (L) 11/26/2017    11/26/2017   CREATSERUM 0.86 11/26/2017   BUN 4 (L) 11/26/20 on vancomycin and Zosyn, cultures pending at this time. Will use morphine as needed for pain. ID and general surgery have been consulted. Ok to advance diet      Iron deficiency anemia  Likely secondary to menorrhagia, will require outpatient follow-up.

## 2017-11-26 NOTE — PROGRESS NOTES
[facility] Pharmacy Note:  Renal Adjustment for Merrem (meropenem)    Mirian Patel is a 43year old female who has been prescribed Maxipime (cefepime) and Merrem (meropenem) 1000 mg every 8 hrs.   CrCl is estimated creatinine clearance is 92.7 mL/min (based

## 2017-11-27 ENCOUNTER — APPOINTMENT (OUTPATIENT)
Dept: MRI IMAGING | Facility: HOSPITAL | Age: 37
DRG: 603 | End: 2017-11-27
Attending: SPECIALIST
Payer: COMMERCIAL

## 2017-11-27 PROCEDURE — 99233 SBSQ HOSP IP/OBS HIGH 50: CPT | Performed by: HOSPITALIST

## 2017-11-27 PROCEDURE — 02HV33Z INSERTION OF INFUSION DEVICE INTO SUPERIOR VENA CAVA, PERCUTANEOUS APPROACH: ICD-10-PCS | Performed by: INTERNAL MEDICINE

## 2017-11-27 PROCEDURE — 72197 MRI PELVIS W/O & W/DYE: CPT | Performed by: SPECIALIST

## 2017-11-27 RX ORDER — LIDOCAINE HYDROCHLORIDE 10 MG/ML
0.5 INJECTION, SOLUTION INFILTRATION; PERINEURAL ONCE AS NEEDED
Status: ACTIVE | OUTPATIENT
Start: 2017-11-27 | End: 2017-11-27

## 2017-11-27 RX ORDER — OXYCODONE AND ACETAMINOPHEN 10; 325 MG/1; MG/1
1 TABLET ORAL EVERY 4 HOURS PRN
Status: DISCONTINUED | OUTPATIENT
Start: 2017-11-27 | End: 2017-11-28

## 2017-11-27 RX ORDER — SODIUM CHLORIDE 0.9 % (FLUSH) 0.9 %
10 SYRINGE (ML) INJECTION AS NEEDED
Status: DISCONTINUED | OUTPATIENT
Start: 2017-11-27 | End: 2017-11-28

## 2017-11-27 NOTE — PROGRESS NOTES
GS    Feels same  Notes and consultations seen  Rt.  Hip somewhat more consolidation   No fluctuance    Still erythema   Other sites same  Agree with MRI  To see possible extent of abscesses and if any pockets

## 2017-11-27 NOTE — PROGRESS NOTES
[place] [facility]    Progress Note    Beatrice Hernandez Patient Status:  Inpatient    1980 MRN [id number]   Location [facility] 5SW/SE Attending Tyson Combs, [address] [facility] Day # 2 PCP Destiny Murray DO       Subjective: Subcutaneous Daily   0.9 % NaCl 1000ml with KCl 20 mEq infusion  Intravenous Continuous   famoTIDine (PEPCID) injection 20 mg 20 mg Intravenous BID         Lab Results  Component Value Date   WBC 5.8 11/27/2017   HGB 8.3 (L) 11/27/2017   HCT 27.4 (L) 11/27 anterior bowel wall extends into the inguinal regions and vulvar soft tissues. No associated abscess. 6. Multiple uterine fibroids. 7. Trace pelvic ascites.                Results:     CBC:      Lab Results  Component Value Date   WBC 5.8 11/27/2017   WBC 1

## 2017-11-27 NOTE — PROGRESS NOTES
[facility] dosing service    Follow-up Pharmacokinetic Consult for Vancomycin Dosing     Mirian Patel is a 43year old female admitted on 11/25 who is being treated for cellulitis. Patient is on day 3 of Vancomycin 1.5 gm IV Q 12 hours.   Goal tro Streptococcus agalactiae (Group B beta strep) (A) N/A   Aerobic Smear 4+ WBCs seen N/A   Aerobic Smear No organisms seen N/A   2.  BLOOD CULTURE Status: None (Preliminary result)   Collection Time: 11/25/17 6:30 PM   Result Value Ref Range   Blood Culture R

## 2017-11-27 NOTE — PLAN OF CARE
Problem: Patient/Family Goals  Goal: Patient/Family Long Term Goal  Patient's Long Term Goal: resolve infection    Interventions:  -dressing changes  -IV vancomycin and zosyn  - See additional Care Plan goals for specific interventions    Outcome: Progress

## 2017-11-27 NOTE — PROGRESS NOTES
Vascular Access Note  Inserted by Koby Dunn RN  Vascular Access Screening:   Allergies to Lidocaine: no  Allergies to Latex: yes  Presence of Pacemaker/Defibrillator: No  Mastectomy with Lymph Node Dissection: No  AV Fistula / AV Graft: No  Dialysis Cat

## 2017-11-28 ENCOUNTER — APPOINTMENT (OUTPATIENT)
Dept: HEMATOLOGY/ONCOLOGY | Facility: HOSPITAL | Age: 37
End: 2017-11-28
Attending: INTERNAL MEDICINE
Payer: COMMERCIAL

## 2017-11-28 VITALS
RESPIRATION RATE: 16 BRPM | HEART RATE: 76 BPM | BODY MASS INDEX: 33.71 KG/M2 | DIASTOLIC BLOOD PRESSURE: 77 MMHG | SYSTOLIC BLOOD PRESSURE: 114 MMHG | WEIGHT: 227.63 LBS | OXYGEN SATURATION: 100 % | HEIGHT: 69 IN | TEMPERATURE: 98 F

## 2017-11-28 PROCEDURE — 99239 HOSP IP/OBS DSCHRG MGMT >30: CPT | Performed by: HOSPITALIST

## 2017-11-28 RX ORDER — OXYCODONE AND ACETAMINOPHEN 10; 325 MG/1; MG/1
1 TABLET ORAL EVERY 4 HOURS PRN
Qty: 30 TABLET | Refills: 0 | Status: SHIPPED | OUTPATIENT
Start: 2017-11-28 | End: 2017-12-11 | Stop reason: ALTCHOICE

## 2017-11-28 NOTE — CM/SW NOTE
SW was informed that pt will be needing IV Abx and is to be discharged today. SW met w/ pt, pt has hx of WILFRID and Bull Dickinson.  Pt reported that her son kept getting into the equipment/supplies when she did infusion at home and would prefer to come to in

## 2017-11-28 NOTE — PLAN OF CARE
Problem: Patient/Family Goals  Goal: Patient/Family Long Term Goal  Patient's Long Term Goal: resolve infection    Interventions:  -dressing changes  -IV vancomycin and zosyn  - See additional Care Plan goals for specific interventions    Outcome: Adequate

## 2017-11-28 NOTE — PROGRESS NOTES
INFECTIOUS DISEASE PROGRESS NOTE    Alvotise Levy Koyanagi Patient Status:  Inpatient    1980 MRN [id number]   Location [facility] 5SW/SE Attending Clemencia Cristina, [address] [facility] Day # 3 PCP Mateus Sebastian DO     Subjective:  Afebrile.  Impr requiring multiple surgeries over the past many years large areas of scar tissue more than likely     PLAN:     -IV antibiotics to continue with vancomycin as well as meropenem.  -Follow-up with cultures and CBC - Group B Strep  -MRI reviewed - abscess not

## 2017-11-28 NOTE — PROGRESS NOTES
[place] [facility] - [facility]    Progress Note    Merribeth Cowden Patient Status:  Inpatient    1980 MRN [id number]   Location [facility] 5SW/SE Attending Janae Steve, [address] [facility] Day # 3 PCP Cyril Segura DO     Subjective:  F reduction was used. FINDINGS:   GI/MESENTERY: No obstruction of the visualized bowel. No extraluminal gas. Trace free fluid in the pelvis, likely physiologic. VASCULAR: No acute vascular abnormality.  LYMPH NODES: A few iliac chain lymph nodes are present note that necrotizing fasciitis remains a clinical diagnosis. A few mildly prominent pelvic lymph nodes are favored to be reactive.       Mri Pelvis (soft Tissue) (w+wo) (cpt=72197)    Result Date: 11/27/2017  PROCEDURE: MRI PELVIS (SOFT TISSUE) (W+WO) (CP submucosal extension on image #11 series 10.  A 2 x 1.6 cm left paramedian upper uterine body mural fibroid 2 adjacent left paramedian lower uterine body mural fibroids measuring 1.4 and 1.9 cm. 2 posterior uterine body mural fibroids measuring 1.1, and 0.7 bacteria     Hyponatremia     Hyperglycemia     Cellulitis of left thigh     Cellulitis of right buttock     Abnormal mammogram     Cellulitis of abdominal wall     Abscess  responding to antibiotics  Long discussion re rectal abscess -  States no pain and

## 2017-11-29 ENCOUNTER — TELEPHONE (OUTPATIENT)
Dept: INTERNAL MEDICINE UNIT | Facility: HOSPITAL | Age: 37
End: 2017-11-29

## 2017-11-29 ENCOUNTER — OFFICE VISIT (OUTPATIENT)
Dept: HEMATOLOGY/ONCOLOGY | Facility: HOSPITAL | Age: 37
End: 2017-11-29
Attending: INTERNAL MEDICINE
Payer: COMMERCIAL

## 2017-11-29 VITALS
RESPIRATION RATE: 16 BRPM | TEMPERATURE: 99 F | HEART RATE: 92 BPM | DIASTOLIC BLOOD PRESSURE: 83 MMHG | SYSTOLIC BLOOD PRESSURE: 146 MMHG

## 2017-11-29 DIAGNOSIS — L03.311 CELLULITIS OF ABDOMINAL WALL: Primary | ICD-10-CM

## 2017-11-29 PROCEDURE — 96365 THER/PROPH/DIAG IV INF INIT: CPT

## 2017-11-29 RX ORDER — 0.9 % SODIUM CHLORIDE 0.9 %
VIAL (ML) INJECTION
Status: DISCONTINUED
Start: 2017-11-29 | End: 2017-11-29

## 2017-11-29 NOTE — PROGRESS NOTES
Pt arrived to infusion for daily Invanz for treatment of abdominal wall cellulitis. Pt had revision of plastic surgery at [facility]. Pt has had several issues since January. Pt denies fevers at home, no chills.  PICC line in place, dressing clean, dry, i

## 2017-11-29 NOTE — TELEPHONE ENCOUNTER
Pt discharged from [facility] on 11/28/17 . Please call to schedule follow up with Primary Care Physician.    Thanks

## 2017-11-30 ENCOUNTER — OFFICE VISIT (OUTPATIENT)
Dept: HEMATOLOGY/ONCOLOGY | Facility: HOSPITAL | Age: 37
End: 2017-11-30
Attending: INTERNAL MEDICINE
Payer: COMMERCIAL

## 2017-11-30 VITALS
HEART RATE: 85 BPM | DIASTOLIC BLOOD PRESSURE: 75 MMHG | TEMPERATURE: 99 F | SYSTOLIC BLOOD PRESSURE: 122 MMHG | RESPIRATION RATE: 16 BRPM

## 2017-11-30 DIAGNOSIS — L03.311 CELLULITIS OF ABDOMINAL WALL: Primary | ICD-10-CM

## 2017-11-30 PROCEDURE — 96365 THER/PROPH/DIAG IV INF INIT: CPT

## 2017-11-30 RX ORDER — 0.9 % SODIUM CHLORIDE 0.9 %
VIAL (ML) INJECTION
Status: DISCONTINUED
Start: 2017-11-30 | End: 2017-11-30

## 2017-11-30 NOTE — PROGRESS NOTES
Pt here for daily antibiotic Invanz  Diagnosis - abdominal wall cellulitis  Start date - 11/29   End date - 12/12  Pt following up with Dr Angelito Esquivel. Pt to follow up with Dr Ana Luisa Santamaria and Dr Dinorah Jackson. Denies pain, fever/chills/ GI issues.   Eating and drinking we

## 2017-12-01 ENCOUNTER — OFFICE VISIT (OUTPATIENT)
Dept: HEMATOLOGY/ONCOLOGY | Facility: HOSPITAL | Age: 37
End: 2017-12-01
Attending: INTERNAL MEDICINE
Payer: COMMERCIAL

## 2017-12-01 VITALS
TEMPERATURE: 99 F | SYSTOLIC BLOOD PRESSURE: 126 MMHG | HEART RATE: 83 BPM | RESPIRATION RATE: 16 BRPM | DIASTOLIC BLOOD PRESSURE: 73 MMHG

## 2017-12-01 DIAGNOSIS — L03.311 CELLULITIS OF ABDOMINAL WALL: Primary | ICD-10-CM

## 2017-12-01 PROCEDURE — 96365 THER/PROPH/DIAG IV INF INIT: CPT

## 2017-12-01 RX ORDER — 0.9 % SODIUM CHLORIDE 0.9 %
VIAL (ML) INJECTION
Status: DISCONTINUED
Start: 2017-12-01 | End: 2017-12-01

## 2017-12-01 NOTE — PROGRESS NOTES
Pt here for daily antibiotic Invanz  Diagnosis - abdominal wall cellulitis  Start date - 11/29   End date - 12/12  Pt following up with Dr Christen Verduzco. Pt to follow up with Dr Mojgan Polk and Dr Nela Grimaldo. Denies pain, fever/chills/ GI issues.   Eating and drinking we

## 2017-12-02 ENCOUNTER — OFFICE VISIT (OUTPATIENT)
Dept: HEMATOLOGY/ONCOLOGY | Facility: HOSPITAL | Age: 37
End: 2017-12-02
Attending: INTERNAL MEDICINE
Payer: COMMERCIAL

## 2017-12-02 VITALS
HEART RATE: 85 BPM | TEMPERATURE: 98 F | SYSTOLIC BLOOD PRESSURE: 125 MMHG | DIASTOLIC BLOOD PRESSURE: 71 MMHG | RESPIRATION RATE: 16 BRPM

## 2017-12-02 DIAGNOSIS — L03.311 CELLULITIS OF ABDOMINAL WALL: Primary | ICD-10-CM

## 2017-12-02 PROCEDURE — 96365 THER/PROPH/DIAG IV INF INIT: CPT

## 2017-12-02 RX ORDER — 0.9 % SODIUM CHLORIDE 0.9 %
VIAL (ML) INJECTION
Status: COMPLETED
Start: 2017-12-02 | End: 2017-12-02

## 2017-12-02 RX ADMIN — 0.9 % SODIUM CHLORIDE: 0.9 % VIAL (ML) INJECTION at 13:45:00

## 2017-12-02 NOTE — PROGRESS NOTES
Pt here for daily antibiotic Invanz  Diagnosis - abdominal wall cellulitis  Start date - 11/29   End date - 12/12  Pt following up with Dr Adriana Lozano. Pt to follow up with Dr Lizzy Lynn and Dr Lashonda Basurto. Denies pain, fever/chills/ BM twice daily, loose.  Taking in a

## 2017-12-03 ENCOUNTER — OFFICE VISIT (OUTPATIENT)
Dept: HEMATOLOGY/ONCOLOGY | Facility: HOSPITAL | Age: 37
End: 2017-12-03
Attending: INTERNAL MEDICINE
Payer: COMMERCIAL

## 2017-12-03 VITALS
TEMPERATURE: 98 F | SYSTOLIC BLOOD PRESSURE: 117 MMHG | DIASTOLIC BLOOD PRESSURE: 71 MMHG | RESPIRATION RATE: 18 BRPM | HEART RATE: 79 BPM

## 2017-12-03 DIAGNOSIS — L03.311 CELLULITIS OF ABDOMINAL WALL: Primary | ICD-10-CM

## 2017-12-03 PROCEDURE — 96365 THER/PROPH/DIAG IV INF INIT: CPT

## 2017-12-03 RX ORDER — 0.9 % SODIUM CHLORIDE 0.9 %
VIAL (ML) INJECTION
Status: COMPLETED
Start: 2017-12-03 | End: 2017-12-03

## 2017-12-03 RX ADMIN — 0.9 % SODIUM CHLORIDE: 0.9 % VIAL (ML) INJECTION at 10:00:00

## 2017-12-03 NOTE — PROGRESS NOTES
Pt here for daily antibiotic Invanz  Diagnosis - abdominal wall cellulitis  Start date - 11/29   End date - 12/12  Pt following up with Dr Jim Mc. Pt to follow up with Dr Goldie Marrero and Dr Debo House. Denies pain, fever/chills/ BM twice daily, loose.  Taking in a

## 2017-12-04 ENCOUNTER — OFFICE VISIT (OUTPATIENT)
Dept: HEMATOLOGY/ONCOLOGY | Facility: HOSPITAL | Age: 37
End: 2017-12-04
Attending: INTERNAL MEDICINE
Payer: COMMERCIAL

## 2017-12-04 VITALS
SYSTOLIC BLOOD PRESSURE: 126 MMHG | HEART RATE: 73 BPM | DIASTOLIC BLOOD PRESSURE: 67 MMHG | RESPIRATION RATE: 18 BRPM | TEMPERATURE: 99 F

## 2017-12-04 DIAGNOSIS — L03.311 CELLULITIS OF ABDOMINAL WALL: Primary | ICD-10-CM

## 2017-12-04 PROCEDURE — 96365 THER/PROPH/DIAG IV INF INIT: CPT

## 2017-12-04 PROCEDURE — 85025 COMPLETE CBC W/AUTO DIFF WBC: CPT

## 2017-12-04 PROCEDURE — 80053 COMPREHEN METABOLIC PANEL: CPT

## 2017-12-04 RX ORDER — 0.9 % SODIUM CHLORIDE 0.9 %
VIAL (ML) INJECTION
Status: DISCONTINUED
Start: 2017-12-04 | End: 2017-12-04

## 2017-12-04 NOTE — PROGRESS NOTES
Patient here for daily antibiotics for abdominal wall cellulitis. Patient ambulated from waiting room with steady gait. Patient state she is feeling fatigued, but other then that ok. She has some right lower abdomen tenderness with palpation.   She has a

## 2017-12-05 ENCOUNTER — OFFICE VISIT (OUTPATIENT)
Dept: HEMATOLOGY/ONCOLOGY | Facility: HOSPITAL | Age: 37
End: 2017-12-05
Attending: INTERNAL MEDICINE
Payer: COMMERCIAL

## 2017-12-05 VITALS
HEART RATE: 74 BPM | TEMPERATURE: 99 F | DIASTOLIC BLOOD PRESSURE: 69 MMHG | SYSTOLIC BLOOD PRESSURE: 121 MMHG | RESPIRATION RATE: 16 BRPM

## 2017-12-05 DIAGNOSIS — L03.311 CELLULITIS OF ABDOMINAL WALL: Primary | ICD-10-CM

## 2017-12-05 PROCEDURE — 96365 THER/PROPH/DIAG IV INF INIT: CPT

## 2017-12-05 RX ORDER — 0.9 % SODIUM CHLORIDE 0.9 %
VIAL (ML) INJECTION
Status: DISCONTINUED
Start: 2017-12-05 | End: 2017-12-05

## 2017-12-05 NOTE — PROGRESS NOTES
Patient here for daily Invanz for abdominal wall cellulitis. Patient denies pain or fever. States she had diarrhea. Today she had diarrhea X5. States she is taking a probiotic. Instructed on Brat diet, and she could try Imodium for diarrhea.   Patient

## 2017-12-06 ENCOUNTER — OFFICE VISIT (OUTPATIENT)
Dept: HEMATOLOGY/ONCOLOGY | Facility: HOSPITAL | Age: 37
End: 2017-12-06
Attending: INTERNAL MEDICINE
Payer: COMMERCIAL

## 2017-12-06 VITALS
SYSTOLIC BLOOD PRESSURE: 103 MMHG | RESPIRATION RATE: 16 BRPM | TEMPERATURE: 98 F | HEART RATE: 77 BPM | DIASTOLIC BLOOD PRESSURE: 70 MMHG

## 2017-12-06 DIAGNOSIS — L03.311 CELLULITIS OF ABDOMINAL WALL: Primary | ICD-10-CM

## 2017-12-06 PROCEDURE — 96365 THER/PROPH/DIAG IV INF INIT: CPT

## 2017-12-06 RX ORDER — 0.9 % SODIUM CHLORIDE 0.9 %
VIAL (ML) INJECTION
Status: DISCONTINUED
Start: 2017-12-06 | End: 2017-12-06

## 2017-12-06 NOTE — PROGRESS NOTES
Pt arrived to infusion for daily Invanz for treatment of abdominal wall cellulitis. Pt [place] [facility] Pt has had several issues since January. Pt denies fevers at home, no chills.  PICC line in place, dressing clean, dry, i

## 2017-12-07 ENCOUNTER — OFFICE VISIT (OUTPATIENT)
Dept: HEMATOLOGY/ONCOLOGY | Facility: HOSPITAL | Age: 37
End: 2017-12-07
Attending: INTERNAL MEDICINE
Payer: COMMERCIAL

## 2017-12-07 VITALS
TEMPERATURE: 97 F | HEART RATE: 75 BPM | DIASTOLIC BLOOD PRESSURE: 72 MMHG | SYSTOLIC BLOOD PRESSURE: 114 MMHG | RESPIRATION RATE: 16 BRPM

## 2017-12-07 DIAGNOSIS — L03.311 CELLULITIS OF ABDOMINAL WALL: Primary | ICD-10-CM

## 2017-12-07 PROCEDURE — 96365 THER/PROPH/DIAG IV INF INIT: CPT

## 2017-12-07 RX ORDER — 0.9 % SODIUM CHLORIDE 0.9 %
VIAL (ML) INJECTION
Status: DISCONTINUED
Start: 2017-12-07 | End: 2017-12-07

## 2017-12-07 NOTE — DISCHARGE SUMMARY
[place] [facility] - [facility]    Discharge Summary    Mirian Esteban Iman Patient Status:  Inpatient    1980 MRN [id number]   Location [facility] 5SW/SE Attending No att. providers found   Hosp Day # 3 PCP Savannah Bussing, DO     Date of significant for hidradenitis presents to the ER with complaint of lower abdominal discomfort and redness. She describes her pain as a burning sensation 9 out of 10 at its worst radiating towards her groin aggravated by touch and no relieving factors.   She taking these medications      Instructions Prescription details   docusate sodium 100 MG Caps  Commonly known as:  COLACE      Take 100 mg by mouth 2 (two) times daily as needed for constipation.    Quantity:  30 capsule  Refills:  0     furosemide 20 MG Ta

## 2017-12-07 NOTE — PROGRESS NOTES
Pt here for daily Invanz for treatment of abdominal wall cellulitis. Pt had revision of plastic surgery at [facility]. Pt has had several issues since January. Pt denies fevers at home, no chills. PICC line in place, dressing clean, dry, intact.  States d

## 2017-12-08 ENCOUNTER — OFFICE VISIT (OUTPATIENT)
Dept: HEMATOLOGY/ONCOLOGY | Facility: HOSPITAL | Age: 37
End: 2017-12-08
Attending: INTERNAL MEDICINE
Payer: COMMERCIAL

## 2017-12-08 VITALS
RESPIRATION RATE: 16 BRPM | HEART RATE: 88 BPM | DIASTOLIC BLOOD PRESSURE: 70 MMHG | TEMPERATURE: 98 F | SYSTOLIC BLOOD PRESSURE: 117 MMHG

## 2017-12-08 DIAGNOSIS — L03.311 CELLULITIS OF ABDOMINAL WALL: Primary | ICD-10-CM

## 2017-12-08 PROCEDURE — 96365 THER/PROPH/DIAG IV INF INIT: CPT

## 2017-12-08 RX ORDER — 0.9 % SODIUM CHLORIDE 0.9 %
VIAL (ML) INJECTION
Status: DISCONTINUED
Start: 2017-12-08 | End: 2017-12-08

## 2017-12-08 NOTE — PROGRESS NOTES
Pt arrived to infusion for daily Invanz for treatment of abdominal wall cellulitis. [place] [facility] Pt has had several issues since January. Pt denies fevers at home, no chills.  PICC line in place, dressing clean, dry, i

## 2017-12-09 ENCOUNTER — OFFICE VISIT (OUTPATIENT)
Dept: HEMATOLOGY/ONCOLOGY | Facility: HOSPITAL | Age: 37
End: 2017-12-09
Attending: INTERNAL MEDICINE
Payer: COMMERCIAL

## 2017-12-09 DIAGNOSIS — L03.311 CELLULITIS OF ABDOMINAL WALL: Primary | ICD-10-CM

## 2017-12-09 PROCEDURE — 96365 THER/PROPH/DIAG IV INF INIT: CPT

## 2017-12-09 RX ORDER — 0.9 % SODIUM CHLORIDE 0.9 %
VIAL (ML) INJECTION
Status: DISCONTINUED
Start: 2017-12-09 | End: 2017-12-09

## 2017-12-09 NOTE — PROGRESS NOTES
Pt here for daily antibiotic Invanz  Diagnosis - abdominal wall cellulitis  Start date - 11/29   End date - 12/12   Patient to follow up with Dr Raphael Paniagua 12/12/17 to determine plan for continued IV antibiotics.   Reports that lesions are healing, no redness

## 2017-12-10 ENCOUNTER — OFFICE VISIT (OUTPATIENT)
Dept: HEMATOLOGY/ONCOLOGY | Facility: HOSPITAL | Age: 37
End: 2017-12-10
Attending: INTERNAL MEDICINE
Payer: COMMERCIAL

## 2017-12-10 VITALS
RESPIRATION RATE: 16 BRPM | SYSTOLIC BLOOD PRESSURE: 116 MMHG | TEMPERATURE: 99 F | HEART RATE: 78 BPM | DIASTOLIC BLOOD PRESSURE: 69 MMHG

## 2017-12-10 DIAGNOSIS — L03.311 CELLULITIS OF ABDOMINAL WALL: Primary | ICD-10-CM

## 2017-12-10 PROCEDURE — 96365 THER/PROPH/DIAG IV INF INIT: CPT

## 2017-12-10 RX ORDER — 0.9 % SODIUM CHLORIDE 0.9 %
VIAL (ML) INJECTION
Status: DISPENSED
Start: 2017-12-10 | End: 2017-12-10

## 2017-12-10 NOTE — PROGRESS NOTES
Pt here for daily antibiotic Invanz  Diagnosis - abdominal wall cellulitis  Start date - 11/29   End date - 12/12   Patient to follow up with Dr May Delgado 12/12/17 to determine plan for continued IV antibiotics.   Reports that lesions are healing, no redness

## 2017-12-11 ENCOUNTER — OFFICE VISIT (OUTPATIENT)
Dept: INTERNAL MEDICINE CLINIC | Facility: CLINIC | Age: 37
End: 2017-12-11

## 2017-12-11 ENCOUNTER — OFFICE VISIT (OUTPATIENT)
Dept: HEMATOLOGY/ONCOLOGY | Facility: HOSPITAL | Age: 37
End: 2017-12-11
Attending: INTERNAL MEDICINE
Payer: COMMERCIAL

## 2017-12-11 VITALS
HEIGHT: 69 IN | TEMPERATURE: 99 F | HEART RATE: 69 BPM | DIASTOLIC BLOOD PRESSURE: 72 MMHG | WEIGHT: 228.19 LBS | BODY MASS INDEX: 33.8 KG/M2 | SYSTOLIC BLOOD PRESSURE: 110 MMHG

## 2017-12-11 VITALS
RESPIRATION RATE: 16 BRPM | TEMPERATURE: 99 F | DIASTOLIC BLOOD PRESSURE: 69 MMHG | SYSTOLIC BLOOD PRESSURE: 126 MMHG | HEART RATE: 73 BPM

## 2017-12-11 DIAGNOSIS — R78.81 BACTEREMIA DUE TO GRAM-POSITIVE BACTERIA: ICD-10-CM

## 2017-12-11 DIAGNOSIS — L73.2 HYDRADENITIS: ICD-10-CM

## 2017-12-11 DIAGNOSIS — L02.91 ABSCESS: ICD-10-CM

## 2017-12-11 DIAGNOSIS — L03.311 CELLULITIS OF ABDOMINAL WALL: Primary | ICD-10-CM

## 2017-12-11 PROCEDURE — 99212 OFFICE O/P EST SF 10 MIN: CPT | Performed by: INTERNAL MEDICINE

## 2017-12-11 PROCEDURE — 85025 COMPLETE CBC W/AUTO DIFF WBC: CPT

## 2017-12-11 PROCEDURE — 80053 COMPREHEN METABOLIC PANEL: CPT

## 2017-12-11 PROCEDURE — 96365 THER/PROPH/DIAG IV INF INIT: CPT

## 2017-12-11 PROCEDURE — 99214 OFFICE O/P EST MOD 30 MIN: CPT | Performed by: INTERNAL MEDICINE

## 2017-12-11 RX ORDER — 0.9 % SODIUM CHLORIDE 0.9 %
VIAL (ML) INJECTION
Status: DISCONTINUED
Start: 2017-12-11 | End: 2017-12-11

## 2017-12-11 NOTE — PATIENT INSTRUCTIONS
1. Cellulitis of abdominal wall  See dr Teresita Perkins, I imagine they will be leaving you on IV antibiotics for an undisclosed amount of time depending on the infectious disease consultation  You have the appointment with infectious disease tomorrow that should

## 2017-12-12 ENCOUNTER — TELEPHONE (OUTPATIENT)
Dept: INTERNAL MEDICINE CLINIC | Facility: CLINIC | Age: 37
End: 2017-12-12

## 2017-12-12 ENCOUNTER — OFFICE VISIT (OUTPATIENT)
Dept: HEMATOLOGY/ONCOLOGY | Facility: HOSPITAL | Age: 37
End: 2017-12-12
Attending: INTERNAL MEDICINE
Payer: COMMERCIAL

## 2017-12-12 VITALS
TEMPERATURE: 99 F | RESPIRATION RATE: 16 BRPM | HEART RATE: 91 BPM | SYSTOLIC BLOOD PRESSURE: 135 MMHG | DIASTOLIC BLOOD PRESSURE: 86 MMHG

## 2017-12-12 DIAGNOSIS — L03.311 CELLULITIS OF ABDOMINAL WALL: Primary | ICD-10-CM

## 2017-12-12 PROCEDURE — 96365 THER/PROPH/DIAG IV INF INIT: CPT

## 2017-12-12 RX ORDER — 0.9 % SODIUM CHLORIDE 0.9 %
VIAL (ML) INJECTION
Status: DISCONTINUED
Start: 2017-12-12 | End: 2017-12-12

## 2017-12-12 NOTE — TELEPHONE ENCOUNTER
Nursing can you please print my office note from yesterday, and fax to the phone number with the letter and communications typed and signed by me, see the assessment and plan for the fax number to [address] [facility]

## 2017-12-12 NOTE — TELEPHONE ENCOUNTER
I do not know why [facility] is calling the hospitalist, patient presented with a prescription written by Dr. Val Mejias from 2 weeks ago, they should be honoring this prescription unless there is a problem with it, [place] De[place]re can you touch base with [facility] and find out wha

## 2017-12-12 NOTE — PROGRESS NOTES
Pt to infusion for daily Invanz for cellulitis of abdominal wall. Pt denies pain. Pt denies n/v.  States a low grade temp at PCP office yesterday. Reports that diarrhea is resolving with taking probiotics, yogurt, and bland diet.   Pt has f/u appt with I

## 2017-12-12 NOTE — TELEPHONE ENCOUNTER
Received call from MANOLO PARSON [facility] at [address] [place] [address], phone [phone], option #2 and then #2 again. [facility] patient brought in today dated 11/28/17.  Rx written by Dr. Nicholas Sandhoff, hospitalist. Med d/c'd from med list by Zac De La Rosa

## 2017-12-12 NOTE — PROGRESS NOTES
HPI:   Galilea Chaparro is a 43year old female who presents for complains of: Patient presents with:  Hospital F/U: Patient admited at [address] [place] [address] 11/25-11/28 dx: cellulitis of abd wall. Started on abx Ertapenem IV daily and oxycodone prn. RT arm picc line.  Jaquelin followin.  Cellulitis of abdominal wall  See dr Sabina Cast, I imagine they will be leaving you on IV antibiotics for an undisclosed amount of time depending on the infectious disease consultation  You have the appointment with infectious disease tomorr

## 2017-12-12 NOTE — TELEPHONE ENCOUNTER
To Dr. Huber Frost - [address] [place], [place] [address]ist, Sade Mauro, calling because [facility] keeps [facility] because pt keeps calling them re: percocet. Sade Mauro has not seen pt in 2 weeks and would like you to address this issue.

## 2017-12-12 NOTE — TELEPHONE ENCOUNTER
Signed document obtained from Dr. Teetee Guadalupe and ov note printed and faxed to [facility] @ [phone]. Fax confirmation received.

## 2017-12-13 ENCOUNTER — OFFICE VISIT (OUTPATIENT)
Dept: HEMATOLOGY/ONCOLOGY | Facility: HOSPITAL | Age: 37
End: 2017-12-13
Attending: INTERNAL MEDICINE
Payer: COMMERCIAL

## 2017-12-13 VITALS
SYSTOLIC BLOOD PRESSURE: 120 MMHG | RESPIRATION RATE: 16 BRPM | DIASTOLIC BLOOD PRESSURE: 67 MMHG | HEART RATE: 68 BPM | TEMPERATURE: 98 F

## 2017-12-13 DIAGNOSIS — L03.311 CELLULITIS OF ABDOMINAL WALL: Primary | ICD-10-CM

## 2017-12-13 PROCEDURE — 96365 THER/PROPH/DIAG IV INF INIT: CPT

## 2017-12-13 RX ORDER — 0.9 % SODIUM CHLORIDE 0.9 %
VIAL (ML) INJECTION
Status: DISCONTINUED
Start: 2017-12-13 | End: 2017-12-13

## 2017-12-14 NOTE — PROGRESS NOTES
Pt arrived to infusion for daily Invanz for treatment of abdominal wall cellulitis. Pt had revision of plastic surgery at [place] [facility]. Pt has had several issues since January. Pt saw ID yesterday, order receieved for two more weeks of IV ABX.  Updated evi

## 2017-12-15 ENCOUNTER — OFFICE VISIT (OUTPATIENT)
Dept: HEMATOLOGY/ONCOLOGY | Facility: HOSPITAL | Age: 37
End: 2017-12-15
Attending: INTERNAL MEDICINE
Payer: COMMERCIAL

## 2017-12-15 VITALS
SYSTOLIC BLOOD PRESSURE: 116 MMHG | RESPIRATION RATE: 16 BRPM | TEMPERATURE: 99 F | DIASTOLIC BLOOD PRESSURE: 70 MMHG | HEART RATE: 81 BPM

## 2017-12-15 DIAGNOSIS — L03.311 CELLULITIS OF ABDOMINAL WALL: Primary | ICD-10-CM

## 2017-12-15 PROCEDURE — 96365 THER/PROPH/DIAG IV INF INIT: CPT

## 2017-12-15 RX ORDER — 0.9 % SODIUM CHLORIDE 0.9 %
VIAL (ML) INJECTION
Status: DISCONTINUED
Start: 2017-12-15 | End: 2017-12-15

## 2017-12-15 NOTE — PROGRESS NOTES
Pt arrived to infusion for daily Invanz for treatment of abdominal wall cellulitis. Pt had revision of plastic surgery at [facility]. Pt has had several issues since January. Pt saw ID yesterday, order receieved for two more weeks of IV ABX.  Updated evi

## 2017-12-16 ENCOUNTER — OFFICE VISIT (OUTPATIENT)
Dept: HEMATOLOGY/ONCOLOGY | Facility: HOSPITAL | Age: 37
End: 2017-12-16
Attending: INTERNAL MEDICINE
Payer: COMMERCIAL

## 2017-12-16 VITALS
DIASTOLIC BLOOD PRESSURE: 72 MMHG | HEART RATE: 77 BPM | TEMPERATURE: 98 F | RESPIRATION RATE: 16 BRPM | SYSTOLIC BLOOD PRESSURE: 113 MMHG

## 2017-12-16 DIAGNOSIS — L03.311 CELLULITIS OF ABDOMINAL WALL: Primary | ICD-10-CM

## 2017-12-16 PROCEDURE — 96365 THER/PROPH/DIAG IV INF INIT: CPT

## 2017-12-16 NOTE — PROGRESS NOTES
Pt to infusion for daily Invanz to treat abdominal wall cellulitis. Arrived ambulating independently. Feeling well, offers no complaints. PICC line C/D/I, line flushing well with positive blood return noted. Invanz given over 30 minutes without incident.  P

## 2017-12-17 ENCOUNTER — TELEPHONE (OUTPATIENT)
Dept: HEMATOLOGY/ONCOLOGY | Facility: HOSPITAL | Age: 37
End: 2017-12-17

## 2017-12-17 ENCOUNTER — OFFICE VISIT (OUTPATIENT)
Dept: HEMATOLOGY/ONCOLOGY | Facility: HOSPITAL | Age: 37
End: 2017-12-17
Attending: INTERNAL MEDICINE
Payer: COMMERCIAL

## 2017-12-17 DIAGNOSIS — L03.311 CELLULITIS OF ABDOMINAL WALL: Primary | ICD-10-CM

## 2017-12-17 RX ORDER — 0.9 % SODIUM CHLORIDE 0.9 %
VIAL (ML) INJECTION
Status: DISPENSED
Start: 2017-12-17 | End: 2017-12-17

## 2017-12-17 NOTE — TELEPHONE ENCOUNTER
Call placed at 10:15am for no show to infusion appointment. VM left requesting call back to confirm patient coming.   Call again at 1040 am for no show indicating unable to keep this appointment as infusion cannot be completed by 11am and center closes at 1

## 2017-12-18 ENCOUNTER — OFFICE VISIT (OUTPATIENT)
Dept: HEMATOLOGY/ONCOLOGY | Facility: HOSPITAL | Age: 37
End: 2017-12-18
Attending: INTERNAL MEDICINE
Payer: COMMERCIAL

## 2017-12-18 VITALS
TEMPERATURE: 98 F | RESPIRATION RATE: 16 BRPM | DIASTOLIC BLOOD PRESSURE: 67 MMHG | HEART RATE: 82 BPM | SYSTOLIC BLOOD PRESSURE: 116 MMHG

## 2017-12-18 DIAGNOSIS — L03.311 CELLULITIS OF ABDOMINAL WALL: Primary | ICD-10-CM

## 2017-12-18 PROCEDURE — 85025 COMPLETE CBC W/AUTO DIFF WBC: CPT

## 2017-12-18 PROCEDURE — 80053 COMPREHEN METABOLIC PANEL: CPT

## 2017-12-18 PROCEDURE — 96365 THER/PROPH/DIAG IV INF INIT: CPT

## 2017-12-18 RX ORDER — 0.9 % SODIUM CHLORIDE 0.9 %
VIAL (ML) INJECTION
Status: DISCONTINUED
Start: 2017-12-18 | End: 2017-12-18

## 2017-12-18 NOTE — PROGRESS NOTES
Patient here for daily antibiotics for abdominal wall cellulitis. Patient ambulated from waiting room with steady gait. Patient state she is feeling fatigued, but other then that ok.   Patient states the wound to her abdomen is now closed and she has no p

## 2017-12-19 ENCOUNTER — OFFICE VISIT (OUTPATIENT)
Dept: HEMATOLOGY/ONCOLOGY | Facility: HOSPITAL | Age: 37
End: 2017-12-19
Attending: INTERNAL MEDICINE
Payer: COMMERCIAL

## 2017-12-19 VITALS
HEART RATE: 71 BPM | SYSTOLIC BLOOD PRESSURE: 131 MMHG | DIASTOLIC BLOOD PRESSURE: 73 MMHG | TEMPERATURE: 99 F | RESPIRATION RATE: 16 BRPM

## 2017-12-19 DIAGNOSIS — L03.311 CELLULITIS OF ABDOMINAL WALL: Primary | ICD-10-CM

## 2017-12-19 PROCEDURE — 96365 THER/PROPH/DIAG IV INF INIT: CPT

## 2017-12-19 RX ORDER — 0.9 % SODIUM CHLORIDE 0.9 %
VIAL (ML) INJECTION
Status: DISPENSED
Start: 2017-12-19 | End: 2017-12-20

## 2017-12-20 ENCOUNTER — OFFICE VISIT (OUTPATIENT)
Dept: HEMATOLOGY/ONCOLOGY | Facility: HOSPITAL | Age: 37
End: 2017-12-20
Attending: INTERNAL MEDICINE
Payer: COMMERCIAL

## 2017-12-20 VITALS
RESPIRATION RATE: 16 BRPM | HEART RATE: 76 BPM | TEMPERATURE: 98 F | DIASTOLIC BLOOD PRESSURE: 70 MMHG | SYSTOLIC BLOOD PRESSURE: 117 MMHG

## 2017-12-20 DIAGNOSIS — L03.311 CELLULITIS OF ABDOMINAL WALL: Primary | ICD-10-CM

## 2017-12-20 PROCEDURE — 96365 THER/PROPH/DIAG IV INF INIT: CPT

## 2017-12-20 RX ORDER — 0.9 % SODIUM CHLORIDE 0.9 %
VIAL (ML) INJECTION
Status: DISCONTINUED
Start: 2017-12-20 | End: 2017-12-20

## 2017-12-21 ENCOUNTER — OFFICE VISIT (OUTPATIENT)
Dept: HEMATOLOGY/ONCOLOGY | Facility: HOSPITAL | Age: 37
End: 2017-12-21
Attending: INTERNAL MEDICINE
Payer: COMMERCIAL

## 2017-12-21 VITALS
HEART RATE: 86 BPM | TEMPERATURE: 98 F | SYSTOLIC BLOOD PRESSURE: 126 MMHG | RESPIRATION RATE: 16 BRPM | DIASTOLIC BLOOD PRESSURE: 68 MMHG

## 2017-12-21 DIAGNOSIS — L03.311 CELLULITIS OF ABDOMINAL WALL: Primary | ICD-10-CM

## 2017-12-21 PROCEDURE — 96365 THER/PROPH/DIAG IV INF INIT: CPT

## 2017-12-21 RX ORDER — 0.9 % SODIUM CHLORIDE 0.9 %
VIAL (ML) INJECTION
Status: DISCONTINUED
Start: 2017-12-21 | End: 2017-12-21

## 2017-12-21 NOTE — PROGRESS NOTES
Pt to infusion for daily Invanz to treat abdominal wall cellulitis. Arrived ambulating independently. Feeling well, offers no complaints. Dedese reports mild nausea, denies any vomiting. Ginger ale and crackers provided.  She also reports having 2 liquid

## 2017-12-21 NOTE — PROGRESS NOTES
Patient arrives for daily antibiotics for abdominal wall cellutlitis. Patient reports she is feeling well, denies any complaints. PICC line present to right upper arm, PICC flushed with saline, positive blood return noted.  Invanz given over thirty minutes,

## 2017-12-22 ENCOUNTER — OFFICE VISIT (OUTPATIENT)
Dept: HEMATOLOGY/ONCOLOGY | Facility: HOSPITAL | Age: 37
End: 2017-12-22
Attending: INTERNAL MEDICINE
Payer: COMMERCIAL

## 2017-12-22 VITALS
SYSTOLIC BLOOD PRESSURE: 112 MMHG | HEART RATE: 66 BPM | DIASTOLIC BLOOD PRESSURE: 70 MMHG | TEMPERATURE: 98 F | RESPIRATION RATE: 16 BRPM

## 2017-12-22 DIAGNOSIS — L03.311 CELLULITIS OF ABDOMINAL WALL: Primary | ICD-10-CM

## 2017-12-22 PROCEDURE — 96365 THER/PROPH/DIAG IV INF INIT: CPT

## 2017-12-22 RX ORDER — 0.9 % SODIUM CHLORIDE 0.9 %
VIAL (ML) INJECTION
Status: DISCONTINUED
Start: 2017-12-22 | End: 2017-12-22

## 2017-12-23 ENCOUNTER — OFFICE VISIT (OUTPATIENT)
Dept: HEMATOLOGY/ONCOLOGY | Facility: HOSPITAL | Age: 37
End: 2017-12-23
Attending: INTERNAL MEDICINE
Payer: COMMERCIAL

## 2017-12-23 VITALS
DIASTOLIC BLOOD PRESSURE: 77 MMHG | TEMPERATURE: 99 F | SYSTOLIC BLOOD PRESSURE: 114 MMHG | RESPIRATION RATE: 16 BRPM | HEART RATE: 79 BPM

## 2017-12-23 DIAGNOSIS — L03.311 CELLULITIS OF ABDOMINAL WALL: Primary | ICD-10-CM

## 2017-12-23 PROCEDURE — 96365 THER/PROPH/DIAG IV INF INIT: CPT

## 2017-12-23 RX ORDER — 0.9 % SODIUM CHLORIDE 0.9 %
VIAL (ML) INJECTION
Status: DISCONTINUED
Start: 2017-12-23 | End: 2017-12-23

## 2017-12-23 NOTE — PROGRESS NOTES
Mirian presents for daily invanz; picc dressing dry and intact - dressing changed. Denies complaints of nausea, diarrhea, and fever/chills at home. Does feel tired at times.     PIcc line flushed with 10ml ns with positive blood return noted, invanz in

## 2017-12-24 ENCOUNTER — OFFICE VISIT (OUTPATIENT)
Dept: HEMATOLOGY/ONCOLOGY | Facility: HOSPITAL | Age: 37
End: 2017-12-24
Attending: INTERNAL MEDICINE
Payer: COMMERCIAL

## 2017-12-24 VITALS
TEMPERATURE: 98 F | HEART RATE: 76 BPM | SYSTOLIC BLOOD PRESSURE: 118 MMHG | DIASTOLIC BLOOD PRESSURE: 71 MMHG | RESPIRATION RATE: 16 BRPM

## 2017-12-24 DIAGNOSIS — L03.311 CELLULITIS OF ABDOMINAL WALL: Primary | ICD-10-CM

## 2017-12-24 PROCEDURE — 96365 THER/PROPH/DIAG IV INF INIT: CPT

## 2017-12-24 NOTE — PROGRESS NOTES
Pt here for daily antibiotic Invanz  Diagnosis - abdominal wall cellulitis  Start date - 11/29   End date - 12/26/17  Saw Dr Max Royal on 12/12, two more weeks antibiotics ordered to address infection. Feeling well overall, no GI upsets.  Energy level re

## 2017-12-25 ENCOUNTER — OFFICE VISIT (OUTPATIENT)
Dept: HEMATOLOGY/ONCOLOGY | Facility: HOSPITAL | Age: 37
End: 2017-12-25
Attending: INTERNAL MEDICINE
Payer: COMMERCIAL

## 2017-12-25 VITALS
DIASTOLIC BLOOD PRESSURE: 71 MMHG | RESPIRATION RATE: 16 BRPM | TEMPERATURE: 99 F | HEART RATE: 74 BPM | SYSTOLIC BLOOD PRESSURE: 110 MMHG

## 2017-12-25 DIAGNOSIS — L03.311 CELLULITIS OF ABDOMINAL WALL: Primary | ICD-10-CM

## 2017-12-25 PROCEDURE — 96365 THER/PROPH/DIAG IV INF INIT: CPT

## 2017-12-25 RX ORDER — 0.9 % SODIUM CHLORIDE 0.9 %
VIAL (ML) INJECTION
Status: DISPENSED
Start: 2017-12-25 | End: 2017-12-25

## 2017-12-25 NOTE — PROGRESS NOTES
Pt here for daily antibiotic Invanz  Diagnosis - abdominal wall cellulitis  Start date - 11/29   End date - 12/26/17    Feeling well overall, no GI upsets. Energy level remains low. Temp 99.1 this am. Denies fever at home or chills.  Advised to monitor tem

## 2017-12-26 ENCOUNTER — OFFICE VISIT (OUTPATIENT)
Dept: HEMATOLOGY/ONCOLOGY | Facility: HOSPITAL | Age: 37
End: 2017-12-26
Attending: INTERNAL MEDICINE
Payer: COMMERCIAL

## 2017-12-26 VITALS
TEMPERATURE: 98 F | DIASTOLIC BLOOD PRESSURE: 67 MMHG | SYSTOLIC BLOOD PRESSURE: 119 MMHG | RESPIRATION RATE: 16 BRPM | HEART RATE: 69 BPM

## 2017-12-26 DIAGNOSIS — L03.311 CELLULITIS OF ABDOMINAL WALL: Primary | ICD-10-CM

## 2017-12-26 PROCEDURE — 96365 THER/PROPH/DIAG IV INF INIT: CPT

## 2017-12-26 PROCEDURE — 80053 COMPREHEN METABOLIC PANEL: CPT

## 2017-12-26 PROCEDURE — 85025 COMPLETE CBC W/AUTO DIFF WBC: CPT

## 2017-12-26 RX ORDER — 0.9 % SODIUM CHLORIDE 0.9 %
VIAL (ML) INJECTION
Status: DISCONTINUED
Start: 2017-12-26 | End: 2017-12-26

## 2017-12-26 NOTE — PROGRESS NOTES
Pt to infusion for daily Invanz for cellulitis of abdominal wall. Pt denies n/v, fevers. Pt states she is still having loose stools. Trying to eat yogurt even though she does not like it. PICC line flushed with good blood return noted.   Weekly CBC and

## 2018-01-02 ENCOUNTER — APPOINTMENT (OUTPATIENT)
Dept: HEMATOLOGY/ONCOLOGY | Facility: HOSPITAL | Age: 38
End: 2018-01-02
Attending: INTERNAL MEDICINE
Payer: COMMERCIAL

## 2018-01-11 ENCOUNTER — OFFICE VISIT (OUTPATIENT)
Dept: INTERNAL MEDICINE CLINIC | Facility: CLINIC | Age: 38
End: 2018-01-11

## 2018-01-11 ENCOUNTER — LAB ENCOUNTER (OUTPATIENT)
Dept: LAB | Facility: HOSPITAL | Age: 38
End: 2018-01-11
Attending: INTERNAL MEDICINE
Payer: COMMERCIAL

## 2018-01-11 VITALS
TEMPERATURE: 98 F | BODY MASS INDEX: 33.74 KG/M2 | HEIGHT: 69.5 IN | WEIGHT: 233 LBS | HEART RATE: 92 BPM | DIASTOLIC BLOOD PRESSURE: 80 MMHG | SYSTOLIC BLOOD PRESSURE: 130 MMHG

## 2018-01-11 DIAGNOSIS — L73.2 HYDRADENITIS: ICD-10-CM

## 2018-01-11 DIAGNOSIS — D64.9 SYMPTOMATIC ANEMIA: ICD-10-CM

## 2018-01-11 DIAGNOSIS — L03.311 CELLULITIS OF ABDOMINAL WALL: ICD-10-CM

## 2018-01-11 DIAGNOSIS — L02.215 PERINEAL ABSCESS: ICD-10-CM

## 2018-01-11 DIAGNOSIS — R78.81 BACTEREMIA DUE TO GRAM-POSITIVE BACTERIA: ICD-10-CM

## 2018-01-11 DIAGNOSIS — R06.02 SOB (SHORTNESS OF BREATH): Primary | ICD-10-CM

## 2018-01-11 DIAGNOSIS — T45.4X5D ADVERSE EFFECT OF IRON, SUBSEQUENT ENCOUNTER: ICD-10-CM

## 2018-01-11 LAB
ANTIBODY SCREEN: NEGATIVE
BASOPHILS # BLD: 0.1 K/UL (ref 0–0.2)
BASOPHILS NFR BLD: 1 %
EOSINOPHIL # BLD: 0.1 K/UL (ref 0–0.7)
EOSINOPHIL NFR BLD: 2 %
ERYTHROCYTE [DISTWIDTH] IN BLOOD BY AUTOMATED COUNT: 18.2 % (ref 11–15)
HCT VFR BLD AUTO: 30.5 % (ref 35–48)
HGB BLD-MCNC: 9.5 G/DL (ref 12–16)
LYMPHOCYTES # BLD: 1.8 K/UL (ref 1–4)
LYMPHOCYTES NFR BLD: 33 %
MCH RBC QN AUTO: 21.8 PG (ref 27–32)
MCHC RBC AUTO-ENTMCNC: 31.1 G/DL (ref 32–37)
MCV RBC AUTO: 70.2 FL (ref 80–100)
MONOCYTES # BLD: 0.5 K/UL (ref 0–1)
MONOCYTES NFR BLD: 9 %
NEUTROPHILS # BLD AUTO: 3.1 K/UL (ref 1.8–7.7)
NEUTROPHILS NFR BLD: 55 %
PLATELET # BLD AUTO: 259 K/UL (ref 140–400)
PMV BLD AUTO: 8.2 FL (ref 7.4–10.3)
RBC # BLD AUTO: 4.35 M/UL (ref 3.7–5.4)
RH BLOOD TYPE: POSITIVE
WBC # BLD AUTO: 5.5 K/UL (ref 4–11)

## 2018-01-11 PROCEDURE — 86901 BLOOD TYPING SEROLOGIC RH(D): CPT

## 2018-01-11 PROCEDURE — 85025 COMPLETE CBC W/AUTO DIFF WBC: CPT

## 2018-01-11 PROCEDURE — 93005 ELECTROCARDIOGRAM TRACING: CPT | Performed by: INTERNAL MEDICINE

## 2018-01-11 PROCEDURE — 36415 COLL VENOUS BLD VENIPUNCTURE: CPT

## 2018-01-11 PROCEDURE — 93000 ELECTROCARDIOGRAM COMPLETE: CPT | Performed by: INTERNAL MEDICINE

## 2018-01-11 PROCEDURE — 99212 OFFICE O/P EST SF 10 MIN: CPT | Performed by: INTERNAL MEDICINE

## 2018-01-11 PROCEDURE — 86900 BLOOD TYPING SEROLOGIC ABO: CPT

## 2018-01-11 PROCEDURE — 99214 OFFICE O/P EST MOD 30 MIN: CPT | Performed by: INTERNAL MEDICINE

## 2018-01-11 PROCEDURE — 86850 RBC ANTIBODY SCREEN: CPT

## 2018-01-11 NOTE — PATIENT INSTRUCTIONS
1. SOB (shortness of breath)  Likely this is from symptomatic anemia, but will have to be on the watch for symptoms, if shortness of breath worsens I want you in the emergency room  Transfusion likely will solve this problem  - ELECTROCARDIOGRAM, COMPLETE

## 2018-01-12 ENCOUNTER — TELEPHONE (OUTPATIENT)
Dept: INTERNAL MEDICINE CLINIC | Facility: CLINIC | Age: 38
End: 2018-01-12

## 2018-01-12 DIAGNOSIS — D64.9 SYMPTOMATIC ANEMIA: Primary | ICD-10-CM

## 2018-01-12 NOTE — TELEPHONE ENCOUNTER
Nursing, can you please coordinate with this patient, or start the paperwork for a 2 unit packed red blood cell transfusion, with no premedication needed, she completed the type and screen today, start the paperwork, I will sign it, try to secure her an ap

## 2018-01-12 NOTE — PROGRESS NOTES
HPI:   Mayra Flores is a 42year old female who presents for complains of: Patient presents with: Follow - Up: 1 month F/U, note for going back to work, skin seems stable, some drainage still.   Palpitations: claims her heart is racing with SOB durin deficits  Musculoskeletal exam: no arthritis appreciated, no obvious deformity    ASSESSMENT AND PLAN:   Sridevidanielle Geovanna Best is a 42year old female who presents with the followin.  SOB (shortness of breath)  Likely this is from symptomatic anemia, bu

## 2018-01-12 NOTE — TELEPHONE ENCOUNTER
Noted spoke with nursing, will complete the old sheet, 2 unit transfusion, please communicate with patient have the infusion center do it.

## 2018-01-12 NOTE — TELEPHONE ENCOUNTER
Called patient and relayed message that she needs to call infusion center tomorrow - verbalize luis etanding , Order , lab results and office note faxed to [phone]

## 2018-01-14 ENCOUNTER — APPOINTMENT (OUTPATIENT)
Dept: CT IMAGING | Facility: HOSPITAL | Age: 38
End: 2018-01-14
Attending: EMERGENCY MEDICINE
Payer: COMMERCIAL

## 2018-01-14 ENCOUNTER — HOSPITAL ENCOUNTER (EMERGENCY)
Facility: HOSPITAL | Age: 38
Discharge: HOME | End: 2018-01-14
Attending: EMERGENCY MEDICINE
Payer: COMMERCIAL

## 2018-01-14 VITALS
RESPIRATION RATE: 18 BRPM | DIASTOLIC BLOOD PRESSURE: 82 MMHG | WEIGHT: 230 LBS | TEMPERATURE: 98 F | SYSTOLIC BLOOD PRESSURE: 127 MMHG | HEART RATE: 67 BPM | OXYGEN SATURATION: 100 % | BODY MASS INDEX: 34.07 KG/M2 | HEIGHT: 69 IN

## 2018-01-14 DIAGNOSIS — K60.4 RECTAL FISTULA: ICD-10-CM

## 2018-01-14 DIAGNOSIS — K61.1 ABSCESS, PERIRECTAL: Primary | ICD-10-CM

## 2018-01-14 LAB
ANION GAP SERPL CALC-SCNC: 6 MMOL/L (ref 0–18)
BASOPHILS # BLD: 0 K/UL (ref 0–0.2)
BASOPHILS NFR BLD: 1 %
BUN SERPL-MCNC: 7 MG/DL (ref 8–20)
BUN/CREAT SERPL: 9 (ref 10–20)
CALCIUM SERPL-MCNC: 9 MG/DL (ref 8.5–10.5)
CHLORIDE SERPL-SCNC: 108 MMOL/L (ref 95–110)
CO2 SERPL-SCNC: 26 MMOL/L (ref 22–32)
CREAT SERPL-MCNC: 0.78 MG/DL (ref 0.5–1.5)
EOSINOPHIL # BLD: 0.1 K/UL (ref 0–0.7)
EOSINOPHIL NFR BLD: 1 %
ERYTHROCYTE [DISTWIDTH] IN BLOOD BY AUTOMATED COUNT: 18.2 % (ref 11–15)
GLUCOSE SERPL-MCNC: 86 MG/DL (ref 70–99)
HCT VFR BLD AUTO: 29.6 % (ref 35–48)
HGB BLD-MCNC: 9.1 G/DL (ref 12–16)
LYMPHOCYTES # BLD: 1.3 K/UL (ref 1–4)
LYMPHOCYTES NFR BLD: 19 %
MCH RBC QN AUTO: 21.7 PG (ref 27–32)
MCHC RBC AUTO-ENTMCNC: 30.7 G/DL (ref 32–37)
MCV RBC AUTO: 70.6 FL (ref 80–100)
MONOCYTES # BLD: 0.6 K/UL (ref 0–1)
MONOCYTES NFR BLD: 8 %
NEUTROPHILS # BLD AUTO: 4.9 K/UL (ref 1.8–7.7)
NEUTROPHILS NFR BLD: 71 %
OSMOLALITY UR CALC.SUM OF ELEC: 287 MOSM/KG (ref 275–295)
PLATELET # BLD AUTO: 256 K/UL (ref 140–400)
PMV BLD AUTO: 8.6 FL (ref 7.4–10.3)
POTASSIUM SERPL-SCNC: 3.5 MMOL/L (ref 3.3–5.1)
RBC # BLD AUTO: 4.2 M/UL (ref 3.7–5.4)
SODIUM SERPL-SCNC: 140 MMOL/L (ref 136–144)
WBC # BLD AUTO: 6.8 K/UL (ref 4–11)

## 2018-01-14 PROCEDURE — 85025 COMPLETE CBC W/AUTO DIFF WBC: CPT | Performed by: EMERGENCY MEDICINE

## 2018-01-14 PROCEDURE — 99284 EMERGENCY DEPT VISIT MOD MDM: CPT

## 2018-01-14 PROCEDURE — 87040 BLOOD CULTURE FOR BACTERIA: CPT | Performed by: EMERGENCY MEDICINE

## 2018-01-14 PROCEDURE — 80048 BASIC METABOLIC PNL TOTAL CA: CPT | Performed by: EMERGENCY MEDICINE

## 2018-01-14 PROCEDURE — 96374 THER/PROPH/DIAG INJ IV PUSH: CPT

## 2018-01-14 PROCEDURE — 72193 CT PELVIS W/DYE: CPT | Performed by: EMERGENCY MEDICINE

## 2018-01-14 RX ORDER — AMOXICILLIN AND CLAVULANATE POTASSIUM 875; 125 MG/1; MG/1
1 TABLET, FILM COATED ORAL 2 TIMES DAILY
Qty: 20 TABLET | Refills: 0 | Status: SHIPPED | OUTPATIENT
Start: 2018-01-14 | End: 2018-01-24

## 2018-01-14 RX ORDER — MORPHINE SULFATE 2 MG/ML
2 INJECTION, SOLUTION INTRAMUSCULAR; INTRAVENOUS ONCE
Status: COMPLETED | OUTPATIENT
Start: 2018-01-14 | End: 2018-01-14

## 2018-01-14 RX ORDER — SULFAMETHOXAZOLE AND TRIMETHOPRIM 800; 160 MG/1; MG/1
1 TABLET ORAL 2 TIMES DAILY
Qty: 20 TABLET | Refills: 0 | Status: SHIPPED | OUTPATIENT
Start: 2018-01-14 | End: 2018-01-24

## 2018-01-14 RX ORDER — MORPHINE SULFATE 2 MG/ML
INJECTION, SOLUTION INTRAMUSCULAR; INTRAVENOUS
Status: COMPLETED
Start: 2018-01-14 | End: 2018-01-14

## 2018-01-14 NOTE — ED NOTES
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

## 2018-01-15 NOTE — ED PROVIDER NOTES
Patient Seen in: [facility] Emergency Department    History   Patient presents with:  Abscess (integumentary)      HPI    The patient returns to the ED complaining of a return of her left-sided perianal abscess.   She states that she was in the hos irrigation with lavage at [facility] - DR YARIEL BARBOUR  1/18/17: OTHER SURGICAL HISTORY      Comment: Bilateral rectal fistulotomies with placement                of bilateral setons and proctoscopy.   10/10/2017: OTHER SURGICAL HISTORY      Comment: Scar Revision present. Cardiovascular: Normal rate and intact distal pulses. Pulmonary/Chest: Effort normal. No stridor. No respiratory distress. Abdominal: Soft. She exhibits no distension.    Genitourinary:   Genitourinary Comments: 4×5 area of erythematous swol fluid collection in the left paramedian gluteal fold, suspicious for abscess. There is a suspected  adjacent fistulous tract to the rectum, although this is better assessed on dedicated prior pelvic MR imaging.  2. Additional low ventral pelvic wall skin th her problem list. to contribute to the complexity of this ED evaluation. ED Course: Patient presents with recurrence of perianal abscess and rectal fistula symptoms.   Otherwise in no distress on examination, laboratory testing obtained, no abnormalities 2 days      MD Abbey Hernández 41  [address]  [phone]    Schedule an appointment as soon as possible for a visit in 3 days        Medications Prescribed:  Discharge Medication List as of 1/14/2018  1:05 PM    S

## 2018-01-15 NOTE — TELEPHONE ENCOUNTER
Received request for ICD 10 code to be written on original order. Form completed and faxed back to Shelby @ [phone], conformation received. Original request sent to scan.

## 2018-01-16 DIAGNOSIS — D64.9 ANEMIA, UNSPECIFIED: Primary | ICD-10-CM

## 2018-01-17 ENCOUNTER — LAB ENCOUNTER (OUTPATIENT)
Dept: LAB | Facility: HOSPITAL | Age: 38
End: 2018-01-17
Attending: INTERNAL MEDICINE
Payer: COMMERCIAL

## 2018-01-17 DIAGNOSIS — D64.9 ANEMIA, UNSPECIFIED: ICD-10-CM

## 2018-01-17 LAB
ANTIBODY SCREEN: NEGATIVE
RH BLOOD TYPE: POSITIVE

## 2018-01-17 PROCEDURE — 86900 BLOOD TYPING SEROLOGIC ABO: CPT

## 2018-01-17 PROCEDURE — 86920 COMPATIBILITY TEST SPIN: CPT

## 2018-01-17 PROCEDURE — 86850 RBC ANTIBODY SCREEN: CPT

## 2018-01-17 PROCEDURE — 36415 COLL VENOUS BLD VENIPUNCTURE: CPT

## 2018-01-17 PROCEDURE — 86901 BLOOD TYPING SEROLOGIC RH(D): CPT

## 2018-01-18 ENCOUNTER — OFFICE VISIT (OUTPATIENT)
Dept: HEMATOLOGY/ONCOLOGY | Facility: HOSPITAL | Age: 38
End: 2018-01-18
Attending: INTERNAL MEDICINE
Payer: COMMERCIAL

## 2018-01-18 VITALS
TEMPERATURE: 98 F | HEART RATE: 78 BPM | RESPIRATION RATE: 16 BRPM | SYSTOLIC BLOOD PRESSURE: 116 MMHG | DIASTOLIC BLOOD PRESSURE: 70 MMHG

## 2018-01-18 DIAGNOSIS — D50.8 IRON DEFICIENCY ANEMIA SECONDARY TO INADEQUATE DIETARY IRON INTAKE: Primary | ICD-10-CM

## 2018-01-18 PROCEDURE — A4216 STERILE WATER/SALINE, 10 ML: HCPCS

## 2018-01-18 PROCEDURE — 36430 TRANSFUSION BLD/BLD COMPNT: CPT

## 2018-01-18 RX ORDER — SODIUM CHLORIDE 9 MG/ML
INJECTION, SOLUTION INTRAVENOUS
Status: DISCONTINUED
Start: 2018-01-18 | End: 2018-01-18

## 2018-01-18 RX ORDER — 0.9 % SODIUM CHLORIDE 0.9 %
VIAL (ML) INJECTION
Status: DISCONTINUED
Start: 2018-01-18 | End: 2018-01-18

## 2018-01-18 NOTE — PROGRESS NOTES
Mirian is here for 2 units PRBC. She received 1 unit PRBC recently in the ED \"when my hemoglobin was 7.5\" - denies any s/s reaction or issues once home. She's been feeling tired, dyspneic with exertion. Appears somewhat pale.   H/H 9.1/ 29.6 on 1/14/

## 2018-01-19 ENCOUNTER — APPOINTMENT (OUTPATIENT)
Dept: HEMATOLOGY/ONCOLOGY | Facility: HOSPITAL | Age: 38
End: 2018-01-19
Attending: INTERNAL MEDICINE
Payer: COMMERCIAL

## 2018-03-01 ENCOUNTER — TELEPHONE (OUTPATIENT)
Dept: INTERNAL MEDICINE CLINIC | Facility: CLINIC | Age: 38
End: 2018-03-01

## 2018-03-01 RX ORDER — AZITHROMYCIN 250 MG/1
TABLET, FILM COATED ORAL
Qty: 6 TABLET | Refills: 0 | Status: SHIPPED | OUTPATIENT
Start: 2018-03-01 | End: 2018-03-06

## 2018-03-01 NOTE — TELEPHONE ENCOUNTER
Please advise - called patient who lost voice a couple days ago, fever 101, body aches , cough with greenish yellowish phlegm,no sore throat - to DR. RED

## 2018-03-01 NOTE — TELEPHONE ENCOUNTER
Please advise - called patient wh o states no antibiotics for 2 months, started Monday losing voice  , today fever and bodyaches and cough - to DR. RED

## 2018-03-01 NOTE — TELEPHONE ENCOUNTER
Okay, let us start her on a Z-Aditya, and if she is not improving, let me know, also encourage her to use whenever over-the-counter think she needs to control cough and phlegm.

## 2018-03-01 NOTE — TELEPHONE ENCOUNTER
Please call pt, body aches, no voice  Please call to discuss/advise  Does pt need to be seen?   Tasked to nursing

## 2018-03-02 ENCOUNTER — HOSPITAL ENCOUNTER (EMERGENCY)
Facility: HOSPITAL | Age: 38
Discharge: HOME | End: 2018-03-02
Attending: EMERGENCY MEDICINE
Payer: COMMERCIAL

## 2018-03-02 VITALS
HEART RATE: 84 BPM | WEIGHT: 224.44 LBS | TEMPERATURE: 99 F | SYSTOLIC BLOOD PRESSURE: 125 MMHG | HEIGHT: 69 IN | OXYGEN SATURATION: 98 % | DIASTOLIC BLOOD PRESSURE: 80 MMHG | RESPIRATION RATE: 16 BRPM | BODY MASS INDEX: 33.24 KG/M2

## 2018-03-02 DIAGNOSIS — L03.311 CELLULITIS OF RIGHT ABDOMINAL WALL: Primary | ICD-10-CM

## 2018-03-02 PROCEDURE — 99283 EMERGENCY DEPT VISIT LOW MDM: CPT

## 2018-03-02 RX ORDER — DOXYCYCLINE HYCLATE 100 MG/1
100 CAPSULE ORAL 2 TIMES DAILY
Qty: 20 CAPSULE | Refills: 0 | Status: SHIPPED | OUTPATIENT
Start: 2018-03-02 | End: 2018-03-06

## 2018-03-02 RX ORDER — DOXYCYCLINE 100 MG/1
100 CAPSULE ORAL ONCE
Status: COMPLETED | OUTPATIENT
Start: 2018-03-02 | End: 2018-03-02

## 2018-03-02 RX ORDER — TRAMADOL HYDROCHLORIDE 50 MG/1
50 TABLET ORAL EVERY 8 HOURS PRN
Qty: 15 TABLET | Refills: 0 | Status: SHIPPED | OUTPATIENT
Start: 2018-03-02 | End: 2018-03-06

## 2018-03-02 NOTE — ED PROVIDER NOTES
Patient Seen in: [facility] AND [facility] Emergency Department    History   No chief complaint on file.     Stated Complaint: Cellulitis     HPI    41 yo F with PMH hidradenitis c/b recurrent abscesses and s/p groin reconstruction secondary to hidradenitis present date: SKIN SURGERY      Comment: rt axilla flap, groin reconstruction  No date: TONSILLECTOMY    Medications :   azithromycin (ZITHROMAX Z-BRANDT) 250 MG Oral Tab,  Take two tablets by mouth today, then one tablet daily.        Family History   Problem Relatio display  0114: Case d/w ID Dr. Dowling Reading covering for Dr. Elidia De Souza  [facility]     DIFFERENTIAL DIAGNOSIS: After history and physical exam differential diagnosis includes but is not limited to cellulitis.     Pulse ox: 98%:Normal on RA, as interpreted by myself    Federico Collins

## 2018-03-02 NOTE — ED INITIAL ASSESSMENT (HPI)
Redness to right groin to right upper leg starting this evening. Started abx at 2100 for fever and aching.

## 2018-03-04 ENCOUNTER — APPOINTMENT (OUTPATIENT)
Dept: ULTRASOUND IMAGING | Facility: HOSPITAL | Age: 38
DRG: 603 | End: 2018-03-04
Attending: EMERGENCY MEDICINE
Payer: COMMERCIAL

## 2018-03-04 ENCOUNTER — HOSPITAL ENCOUNTER (INPATIENT)
Facility: HOSPITAL | Age: 38
LOS: 2 days | Discharge: HOME | DRG: 603 | End: 2018-03-06
Attending: EMERGENCY MEDICINE | Admitting: HOSPITALIST
Payer: COMMERCIAL

## 2018-03-04 DIAGNOSIS — L03.116 CELLULITIS OF LEFT LOWER EXTREMITY: Primary | ICD-10-CM

## 2018-03-04 LAB
ANION GAP SERPL CALC-SCNC: 9 MMOL/L (ref 0–18)
BASOPHILS # BLD: 0.1 K/UL (ref 0–0.2)
BASOPHILS NFR BLD: 1 %
BUN SERPL-MCNC: 5 MG/DL (ref 8–20)
BUN/CREAT SERPL: 6 (ref 10–20)
CALCIUM SERPL-MCNC: 9.2 MG/DL (ref 8.5–10.5)
CHLORIDE SERPL-SCNC: 101 MMOL/L (ref 95–110)
CO2 SERPL-SCNC: 27 MMOL/L (ref 22–32)
CREAT SERPL-MCNC: 0.84 MG/DL (ref 0.5–1.5)
EOSINOPHIL # BLD: 0 K/UL (ref 0–0.7)
EOSINOPHIL NFR BLD: 0 %
ERYTHROCYTE [DISTWIDTH] IN BLOOD BY AUTOMATED COUNT: 23.5 % (ref 11–15)
GLUCOSE SERPL-MCNC: 93 MG/DL (ref 70–99)
HCT VFR BLD AUTO: 37.4 % (ref 35–48)
HGB BLD-MCNC: 11.9 G/DL (ref 12–16)
LACTATE SERPL-SCNC: 0.8 MMOL/L (ref 0.5–2.2)
LYMPHOCYTES # BLD: 2 K/UL (ref 1–4)
LYMPHOCYTES NFR BLD: 18 %
MCH RBC QN AUTO: 23.3 PG (ref 27–32)
MCHC RBC AUTO-ENTMCNC: 31.8 G/DL (ref 32–37)
MCV RBC AUTO: 73.2 FL (ref 80–100)
MONOCYTES # BLD: 1.3 K/UL (ref 0–1)
MONOCYTES NFR BLD: 12 %
NEUTROPHILS # BLD AUTO: 7.9 K/UL (ref 1.8–7.7)
NEUTROPHILS NFR BLD: 70 %
OSMOLALITY UR CALC.SUM OF ELEC: 281 MOSM/KG (ref 275–295)
PLATELET # BLD AUTO: 298 K/UL (ref 140–400)
PMV BLD AUTO: 8.3 FL (ref 7.4–10.3)
POTASSIUM SERPL-SCNC: 3.7 MMOL/L (ref 3.3–5.1)
RBC # BLD AUTO: 5.11 M/UL (ref 3.7–5.4)
SODIUM SERPL-SCNC: 137 MMOL/L (ref 136–144)
WBC # BLD AUTO: 11.4 K/UL (ref 4–11)

## 2018-03-04 PROCEDURE — 99222 1ST HOSP IP/OBS MODERATE 55: CPT | Performed by: HOSPITALIST

## 2018-03-04 PROCEDURE — 93971 EXTREMITY STUDY: CPT | Performed by: EMERGENCY MEDICINE

## 2018-03-04 RX ORDER — HEPARIN SODIUM 5000 [USP'U]/ML
5000 INJECTION, SOLUTION INTRAVENOUS; SUBCUTANEOUS EVERY 12 HOURS SCHEDULED
Status: DISCONTINUED | OUTPATIENT
Start: 2018-03-05 | End: 2018-03-06

## 2018-03-04 RX ORDER — MORPHINE SULFATE 2 MG/ML
2 INJECTION, SOLUTION INTRAMUSCULAR; INTRAVENOUS EVERY 4 HOURS PRN
Status: DISCONTINUED | OUTPATIENT
Start: 2018-03-04 | End: 2018-03-06

## 2018-03-04 RX ORDER — HYDROCODONE BITARTRATE AND ACETAMINOPHEN 5; 325 MG/1; MG/1
1 TABLET ORAL EVERY 6 HOURS PRN
Status: DISCONTINUED | OUTPATIENT
Start: 2018-03-04 | End: 2018-03-06

## 2018-03-04 RX ORDER — MORPHINE SULFATE 4 MG/ML
4 INJECTION, SOLUTION INTRAMUSCULAR; INTRAVENOUS ONCE
Status: COMPLETED | OUTPATIENT
Start: 2018-03-04 | End: 2018-03-04

## 2018-03-04 RX ORDER — SODIUM CHLORIDE 0.9 % (FLUSH) 0.9 %
3 SYRINGE (ML) INJECTION AS NEEDED
Status: DISCONTINUED | OUTPATIENT
Start: 2018-03-04 | End: 2018-03-06

## 2018-03-04 RX ORDER — SODIUM CHLORIDE 9 MG/ML
INJECTION, SOLUTION INTRAVENOUS CONTINUOUS
Status: DISCONTINUED | OUTPATIENT
Start: 2018-03-04 | End: 2018-03-05

## 2018-03-04 RX ORDER — MORPHINE SULFATE 2 MG/ML
4 INJECTION, SOLUTION INTRAMUSCULAR; INTRAVENOUS EVERY 4 HOURS PRN
Status: DISCONTINUED | OUTPATIENT
Start: 2018-03-04 | End: 2018-03-06

## 2018-03-04 RX ORDER — ONDANSETRON 2 MG/ML
4 INJECTION INTRAMUSCULAR; INTRAVENOUS EVERY 6 HOURS PRN
Status: DISCONTINUED | OUTPATIENT
Start: 2018-03-04 | End: 2018-03-06

## 2018-03-05 ENCOUNTER — TELEPHONE (OUTPATIENT)
Dept: INTERNAL MEDICINE CLINIC | Facility: CLINIC | Age: 38
End: 2018-03-05

## 2018-03-05 LAB
ANION GAP SERPL CALC-SCNC: 4 MMOL/L (ref 0–18)
BASOPHILS # BLD: 0.1 K/UL (ref 0–0.2)
BASOPHILS NFR BLD: 1 %
BUN SERPL-MCNC: 5 MG/DL (ref 8–20)
BUN/CREAT SERPL: 5.7 (ref 10–20)
CALCIUM SERPL-MCNC: 8.6 MG/DL (ref 8.5–10.5)
CHLORIDE SERPL-SCNC: 108 MMOL/L (ref 95–110)
CO2 SERPL-SCNC: 27 MMOL/L (ref 22–32)
CREAT SERPL-MCNC: 0.87 MG/DL (ref 0.5–1.5)
EOSINOPHIL # BLD: 0.1 K/UL (ref 0–0.7)
EOSINOPHIL NFR BLD: 1 %
ERYTHROCYTE [DISTWIDTH] IN BLOOD BY AUTOMATED COUNT: 23.4 % (ref 11–15)
GLUCOSE SERPL-MCNC: 88 MG/DL (ref 70–99)
HCT VFR BLD AUTO: 31.9 % (ref 35–48)
HGB BLD-MCNC: 9.9 G/DL (ref 12–16)
LYMPHOCYTES # BLD: 2.6 K/UL (ref 1–4)
LYMPHOCYTES NFR BLD: 27 %
MCH RBC QN AUTO: 23.1 PG (ref 27–32)
MCHC RBC AUTO-ENTMCNC: 31.1 G/DL (ref 32–37)
MCV RBC AUTO: 74.3 FL (ref 80–100)
MONOCYTES # BLD: 1.4 K/UL (ref 0–1)
MONOCYTES NFR BLD: 15 %
NEUTROPHILS # BLD AUTO: 5.5 K/UL (ref 1.8–7.7)
NEUTROPHILS NFR BLD: 57 %
OSMOLALITY UR CALC.SUM OF ELEC: 285 MOSM/KG (ref 275–295)
PLATELET # BLD AUTO: 283 K/UL (ref 140–400)
PMV BLD AUTO: 8.5 FL (ref 7.4–10.3)
POTASSIUM SERPL-SCNC: 4.4 MMOL/L (ref 3.3–5.1)
RBC # BLD AUTO: 4.3 M/UL (ref 3.7–5.4)
SODIUM SERPL-SCNC: 139 MMOL/L (ref 136–144)
WBC # BLD AUTO: 9.6 K/UL (ref 4–11)

## 2018-03-05 PROCEDURE — 99232 SBSQ HOSP IP/OBS MODERATE 35: CPT | Performed by: HOSPITALIST

## 2018-03-05 RX ORDER — KETOROLAC TROMETHAMINE 30 MG/ML
30 INJECTION, SOLUTION INTRAMUSCULAR; INTRAVENOUS EVERY 6 HOURS PRN
Status: DISCONTINUED | OUTPATIENT
Start: 2018-03-05 | End: 2018-03-06

## 2018-03-05 NOTE — TELEPHONE ENCOUNTER
Patient is currently at [facility]. Mother would like to talk to you about her condition.

## 2018-03-05 NOTE — ED PROVIDER NOTES
Patient Seen in: [facility] AND [facility] Emergency Department    History   Patient presents with:  Cellulitis (integumentary, infectious)    Stated Complaint: l leg cellulitis    HPI    Patient is a 78-year-old female with history of frequent cellulitis who pr placement                of bilateral setons and proctoscopy.   10/10/2017: OTHER SURGICAL HISTORY      Comment: Scar Revision to abd  No date: SKIN SURGERY      Comment: rt axilla flap, groin reconstruction  No date: TONSILLECTOMY        Smoking status: Ne All other components within normal limits   LACTIC ACID, PLASMA - Normal   CBC WITH DIFFERENTIAL WITH PLATELET    Narrative: The following orders were created for panel order CBC WITH DIFFERENTIAL WITH PLATELET.   Procedure

## 2018-03-05 NOTE — H&P
[facility]    PATIENT'S NAME: Mikie Orozco   ATTENDING PHYSICIAN: Eleuterio Jo MD   PATIENT ACCOUNT#:   [de-identified]    LOCATION:  [address] [place] [address] #:   [id number]       YOB: 1980  ADMISSION DATE: She was admitted to the hospital for further evaluation and treatment with Infectious Disease on consultation.      PAST MEDICAL HISTORY:  Significant for hidradenitis suppurativa with excessive debridement of the right axillary area, bilateral groin areas, blood pressure 145/73, O2 saturation 95% on room air. HEENT:  Normocephalic, atraumatic. Pupils equal, reactive to light. Sclerae anicteric. There is no sinus tenderness. Mucous membranes were dry. NECK:  Supple.   LUNGS:  Essentially clear with easy and sensation is normal in the lower extremities bilaterally. 2.   Hidradenitis suppurativa, status post multiple surgical procedures for I and D and repair of rectal fistulas. 3.   Excisional biopsy of a cyst of the left breast, which was benign.   4.

## 2018-03-05 NOTE — PLAN OF CARE
Integumentary status not within defined limits    • Pt's integumentary status will be adequate for discharge Progressing        PAIN - ADULT    • Verbalizes/displays adequate comfort level or patient's stated pain goal Progressing        Patient Centered C

## 2018-03-05 NOTE — CONSULTS
[facility] - [facility]    Report of Consultation    Mirian Patel Patient Status:  Inpatient    1980 MRN [id number]   Location [facility] 5SW/SE Attending Martha Posey MD   [facility] Day # 1 PCP Florentino Vanessa,      Date of Admiss Procedure: ESOPHAGOGASTRODUODENOSCOPY (EGD);                  Surgeon: Laron Muniz MD;  Location: [address] [place] [address] MAIN                OR  2006: KNEE SURGERY Left      Comment: cyst removal  No date: NEEDLE BIOPSY LEFT  5/30/13: OTHER SURGICAL HISTORY      Comment: Exa Admission:  Doxycycline Hyclate 100 MG Oral Cap Take 1 capsule (100 mg total) by mouth 2 (two) times daily.    TraMADol HCl 50 MG Oral Tab Take 1 tablet (50 mg total) by mouth every 8 (eight) hours as needed for Pain (Use caution while taking this medicatio neurologic deficits  HEENT normal, no pharyngeal erythema or exudates  Neck supple, no masses, no adenopathy  No rash  Chest normal  Heart regular, no murmurs  No labored breathing, lungs clear anteriorly  Abdomen soft, NT, no rebound, no masses, healed in flap surgery involving both groins at [facility] by Dr. Shagufta Valdez in October 2017.         Recommendations:    - continue vancomycin  - L leg elevation  - monitor for improvement  - BC  - ?trial suppressive abx with po bactrim x 3-6 months after d/c  - d/w

## 2018-03-05 NOTE — PROGRESS NOTES
ADMISSION NOTE    42year old female well known to me for multiple previous admissions for cellulitis/abscess formation secondary to hydradenitis presents with LLE cellulitis . Available medical records partially reviewed. Dictation to follow.     Lico Bell

## 2018-03-05 NOTE — ED INITIAL ASSESSMENT (HPI)
C/o L leg redness, pain x 2 days states she was here last night and was given AB, states it got worse

## 2018-03-05 NOTE — PROGRESS NOTES
[facility]  Progress Note     Mirian Wild  : 1980    Status: Inpatient  Day #: 1    Attending: Sorin Ralph MD  PCP: Jarome Nimrod D'Amico,       Assessment and Plan     Left lower extremity cellulitis  –Infectious disease in is agreement without major discrepancies.               Medications     • vancomycin  1,750 mg Intravenous Q12H   • Heparin Sodium (Porcine)  5,000 Units Subcutaneous 2 times per day      PRN Meds: ketorolac (TORADOL) injection, Normal Saline Flush, ondanse

## 2018-03-05 NOTE — PROGRESS NOTES
[facility] dosing service    Initial Pharmacokinetic Consult for Vancomycin Dosing     Mirian Negro is a 42year old female admitted on 3/4 who is being treated for cellulitis.   Pharmacy has been asked to dose Vancomycin by Dr. Zhen Elias    She is [phone]

## 2018-03-05 NOTE — PLAN OF CARE
Problem: PAIN - ADULT  Goal: Verbalizes/displays adequate comfort level or patient's stated pain goal  INTERVENTIONS:  - Encourage pt to monitor pain and request assistance  - Assess pain using appropriate pain scale  - Administer analgesics based on type cultural backgrounds into the planning and delivery of care  - Encourage patient/family to participate in care and decision-making at the level they choose  - Honor patient and family perspectives and choices   Outcome: Progressing      Problem: Integument

## 2018-03-06 VITALS
WEIGHT: 219.31 LBS | RESPIRATION RATE: 16 BRPM | DIASTOLIC BLOOD PRESSURE: 60 MMHG | TEMPERATURE: 98 F | HEART RATE: 70 BPM | OXYGEN SATURATION: 99 % | SYSTOLIC BLOOD PRESSURE: 120 MMHG | HEIGHT: 69 IN | BODY MASS INDEX: 32.48 KG/M2

## 2018-03-06 LAB
BASOPHILS # BLD: 0.1 K/UL (ref 0–0.2)
BASOPHILS NFR BLD: 1 %
EOSINOPHIL # BLD: 0.2 K/UL (ref 0–0.7)
EOSINOPHIL NFR BLD: 2 %
ERYTHROCYTE [DISTWIDTH] IN BLOOD BY AUTOMATED COUNT: 23.9 % (ref 11–15)
HCT VFR BLD AUTO: 32.2 % (ref 35–48)
HGB BLD-MCNC: 10 G/DL (ref 12–16)
LYMPHOCYTES # BLD: 2.3 K/UL (ref 1–4)
LYMPHOCYTES NFR BLD: 25 %
MCH RBC QN AUTO: 23.2 PG (ref 27–32)
MCHC RBC AUTO-ENTMCNC: 31.2 G/DL (ref 32–37)
MCV RBC AUTO: 74.3 FL (ref 80–100)
MONOCYTES # BLD: 1.2 K/UL (ref 0–1)
MONOCYTES NFR BLD: 13 %
NEUTROPHILS # BLD AUTO: 5.4 K/UL (ref 1.8–7.7)
NEUTROPHILS NFR BLD: 59 %
PLATELET # BLD AUTO: 269 K/UL (ref 140–400)
PMV BLD AUTO: 8.2 FL (ref 7.4–10.3)
RBC # BLD AUTO: 4.33 M/UL (ref 3.7–5.4)
VANCOMYCIN TROUGH SERPL-MCNC: 22.4 MCG/ML (ref 10–20)
WBC # BLD AUTO: 9.2 K/UL (ref 4–11)

## 2018-03-06 PROCEDURE — 99233 SBSQ HOSP IP/OBS HIGH 50: CPT | Performed by: HOSPITALIST

## 2018-03-06 RX ORDER — GABAPENTIN 100 MG/1
100 CAPSULE ORAL 2 TIMES DAILY
Qty: 14 CAPSULE | Refills: 0 | Status: SHIPPED | OUTPATIENT
Start: 2018-03-06 | End: 2018-03-29

## 2018-03-06 RX ORDER — GABAPENTIN 100 MG/1
100 CAPSULE ORAL 2 TIMES DAILY
Status: DISCONTINUED | OUTPATIENT
Start: 2018-03-06 | End: 2018-03-06

## 2018-03-06 RX ORDER — HYDROCODONE BITARTRATE AND ACETAMINOPHEN 5; 325 MG/1; MG/1
1 TABLET ORAL EVERY 6 HOURS PRN
Qty: 10 TABLET | Refills: 0 | Status: SHIPPED | OUTPATIENT
Start: 2018-03-06 | End: 2018-03-06

## 2018-03-06 RX ORDER — CLINDAMYCIN HYDROCHLORIDE 300 MG/1
300 CAPSULE ORAL 3 TIMES DAILY
Qty: 30 CAPSULE | Refills: 0 | Status: SHIPPED | OUTPATIENT
Start: 2018-03-06 | End: 2018-03-16

## 2018-03-06 RX ORDER — HYDROCODONE BITARTRATE AND ACETAMINOPHEN 5; 325 MG/1; MG/1
1 TABLET ORAL EVERY 6 HOURS PRN
Qty: 10 TABLET | Refills: 0 | Status: SHIPPED | OUTPATIENT
Start: 2018-03-06 | End: 2018-03-29 | Stop reason: ALTCHOICE

## 2018-03-06 NOTE — PROGRESS NOTES
[facility]  Progress Note     Mirian Valleslynn Consuelo  : 1980    Status: Inpatient  Day #: 2    Attending: Carson Landers MD  PCP: Kennth Sprain D'Amico,       Assessment and Plan     Left lower extremity cellulitis  –Infectious disease in 0543   BUN  5*  5*   CREATSERUM  0.84  0.87   GFRAA  >60  >60   GFRNAA  >60  >60   CA  9.2  8.6   NA  137  139   K  3.7  4.4   CL  101  108   CO2  27  27   GLU  93  88       Us Venous Doppler Leg Left - Diag Img (cpt=93971)    Result Date: 3/5/2018  CONCLU

## 2018-03-06 NOTE — PLAN OF CARE
Problem: Integumentary status not within defined limits  Goal: Pt's integumentary status will be adequate for discharge  Outcome: Adequate for Discharge

## 2018-03-06 NOTE — PROGRESS NOTES
[facility] - [facility]    INFECTIOUS DISEASE PROGRESS NOTE    Mirian Vallesjen Cordero Patient Status:  Inpatient    1980 MRN [id number]   Location [facility] 5SW/SE Attending Mera West MD   [facility] Day # 2 PCP Sallie Bermudez DO     ANTI menstrual period 03/04/2018, SpO2 99 %, not currently breastfeeding.     Awake, alert, NAD  No focal neurologic deficits  HEENT normal, no pharyngeal erythema or exudates  Neck supple, no masses, no adenopathy  No rash  Chest normal  Heart regular, no murmu Recent abdominal wall cellulitis, resolved with doxycycline started 3/2  # H/o recurrent cellulitis  # H/o significant Hidradenitis suppurative, s/p major corrective flap surgery involving both groins at [facility] by Dr. Miko Voss in October 2017.

## 2018-03-06 NOTE — PLAN OF CARE
Problem: PAIN - ADULT  Goal: Verbalizes/displays adequate comfort level or patient's stated pain goal  INTERVENTIONS:  - Encourage pt to monitor pain and request assistance  - Assess pain using appropriate pain scale  - Administer analgesics based on type information to patient/family  - Incorporate patient and family knowledge, values, beliefs, and cultural backgrounds into the planning and delivery of care  - Encourage patient/family to participate in care and decision-making at the level they choose  - H

## 2018-03-06 NOTE — PROGRESS NOTES
[facility] dosing service    Follow-up Pharmacokinetic Consult for Vancomycin Dosing     Mirian Hernandez Reasons is a 42year old female who is being treated for cellulitis. Patient is on day 3 of Vancomycin 1.5 gm IV Q 12 hours.   Goal trough is 10-15 ug/mL efficacy.     Sofía Rosas, PharmD  3/6/2018  1:25 PM  [address]: [phone]

## 2018-03-06 NOTE — PLAN OF CARE
Discharge:    Patient refused Heparin SC, SCDs, and Neurontin. \"I'm not taking that nerve pill. \"    Vanco trough was high and informed pharmacy. Still waiting for dose change. Patient cleared by ID with script at pts pharmacy.  Dr. Selene Grant gave discharge i

## 2018-03-06 NOTE — TELEPHONE ENCOUNTER
Spoke with patient's mother, and is recently completed hospital course, after cellulitis again diagnosed, is now home on antibiotics, will follow up in the office with me in the near future. Patient's mother verbalized understanding.   No medical informati

## 2018-03-06 NOTE — TELEPHONE ENCOUNTER
Pt's mother called back  Would like to speak with you today re: her daughter who is in the hospital  Mother is very upset & concerned re: her care [phone]

## 2018-03-07 ENCOUNTER — TELEPHONE (OUTPATIENT)
Dept: INTERNAL MEDICINE CLINIC | Facility: CLINIC | Age: 38
End: 2018-03-07

## 2018-03-07 NOTE — PAYOR COMM NOTE
--------------  DISCHARGE REVIEW    Payor: MENA MARTEL  Subscriber #:  [id number]  Authorization Number: [id number]    Admit date: 3/4/18  Admit time:  2248  Discharge Date: 3/6/2018  2:19 PM     Admitting Physician: Minerva Menon MD  Attending Phys Maru Oliveira MD at 3/4/2018 10:00 PM     Author:  Maru Oliveira MD Service:  (none) Author Type:  Physician    Filed:  3/4/2018 10:00 PM Date of Service:  3/4/2018  8:42 PM Status:  Signed    :  Maru Oliveira MD (Physician) removal  No date: NEEDLE BIOPSY LEFT  5/30/13: OTHER SURGICAL HISTORY      Comment: Exam under anthesthesia , labial drainage and                irrigation with lavage at [facility] - DR YARIEL BARBOUR  1/18/17: OTHER SURGICAL HISTORY      Comment: Bilateral rectal All other components within normal limits   CBC W/ DIFFERENTIAL - Abnormal; Notable for the following:     WBC 11.4 (*)     HGB 11.9 (*)     MCV 73.2 (*)     MCH 23.3 (*)     MCHC 31.8 (*)     RDW 23.5 (*)     Neutrophil Absolute 7.9 (*)     Monocyte Ab diagnosis)[AM.2]    Disposition:[AM.1]  Admit  3/4/2018  9:29 pm[AM.2]    Follow-up:[AM.1]  No follow-up provider specified. [AM.2]      Medications Prescribed:[AM.1]  Current Discharge Medication List        Present on Admission  Date Reviewed: 1/11/2018 she had taken 2 Zithromax tablets the day before she presented to the emergency room. At that time, she was told that she could discontinue the Z-Aditya and continue the doxycycline for both the cellulitis and her URI symptoms.   The patient states that the U as needed for pain. ALLERGIES:  Latex and cefoxitin. Cefoxitin caused neutropenia. FAMILY HISTORY:  The patient's father  in 2016 at the age of 77 from a massive MI. Her mother has hypertension and hypothyroidism.   She has 8 siblings tenderness noted. SKIN:  Warm and dry without any significant rashes but there are multiple scars, some mild keloid scars from previous I and D procedures.       LABORATORY DATA:  Glucose 93, sodium 137, potassium 3.7, chloride 101, CO2 of 27, BUN 5, with 3/6/2018 0913 Given 4 mg Intravenous Mandy Barkley RN      Normal Saline Flush 0.9 % injection 3 mL     Date Action Dose Route User    Discharged on 3/6/2018    3/6/2018 0914 Given 3 mL Intravenous Mandy Barkley RN

## 2018-03-07 NOTE — TELEPHONE ENCOUNTER
Pt discharged from [facility] on 3/6/18 . Please call to schedule follow up with Primary Care Physician.    Thanks

## 2018-03-19 NOTE — TELEPHONE ENCOUNTER
Patient discharged from hospital 3/6/2018, same day her sister passed away. First chance she has had to call back for hospital follow up. First opening is 3/29, is this okay or do you want to see her tomorrow?   If you want to see her tomorrow, at what

## 2018-03-20 ENCOUNTER — OFFICE VISIT (OUTPATIENT)
Dept: INTERNAL MEDICINE CLINIC | Facility: CLINIC | Age: 38
End: 2018-03-20

## 2018-03-20 VITALS
HEART RATE: 91 BPM | WEIGHT: 221 LBS | OXYGEN SATURATION: 99 % | TEMPERATURE: 99 F | BODY MASS INDEX: 32.73 KG/M2 | DIASTOLIC BLOOD PRESSURE: 80 MMHG | HEIGHT: 69 IN | SYSTOLIC BLOOD PRESSURE: 116 MMHG

## 2018-03-20 DIAGNOSIS — L03.116 CELLULITIS OF LEFT LOWER EXTREMITY: Primary | ICD-10-CM

## 2018-03-20 DIAGNOSIS — R63.4 WEIGHT LOSS: ICD-10-CM

## 2018-03-20 PROCEDURE — 1111F DSCHRG MED/CURRENT MED MERGE: CPT | Performed by: INTERNAL MEDICINE

## 2018-03-20 PROCEDURE — 99212 OFFICE O/P EST SF 10 MIN: CPT | Performed by: INTERNAL MEDICINE

## 2018-03-20 PROCEDURE — 99214 OFFICE O/P EST MOD 30 MIN: CPT | Performed by: INTERNAL MEDICINE

## 2018-03-20 RX ORDER — CLINDAMYCIN HYDROCHLORIDE 300 MG/1
300 CAPSULE ORAL 3 TIMES DAILY
COMMUNITY
End: 2018-03-20

## 2018-03-20 RX ORDER — CLINDAMYCIN HYDROCHLORIDE 300 MG/1
300 CAPSULE ORAL 3 TIMES DAILY
Qty: 30 CAPSULE | Refills: 0 | Status: SHIPPED | OUTPATIENT
Start: 2018-03-20 | End: 2018-03-29 | Stop reason: ALTCHOICE

## 2018-03-20 NOTE — PATIENT INSTRUCTIONS
1. Cellulitis of left lower extremity  Cont on meds and restart with recurrence now, 10 more days and call me with update  Looks like you need IV abx using in the first place and then oral abx  If you get worse go in for IV abx again and think about the f/

## 2018-03-20 NOTE — PROGRESS NOTES
HPI:   Merribeth Cowden is a 42year old female who presents for complains of: Patient presents with:  Hospital F/U: Was discharged from hosp on 3/6, after treated for LLE cellulitis, improved and DC'd home on oral abx  Checkup: Has been on ABT since hos extremity  Cont on meds and restart with recurrence now, 10 more days and call me with update  Looks like you need IV abx using in the first place and then oral abx  If you get worse go in for IV abx again and think about the f/u with dr Preet Conner

## 2018-03-29 ENCOUNTER — HOSPITAL ENCOUNTER (INPATIENT)
Facility: HOSPITAL | Age: 38
LOS: 2 days | Discharge: HOME | DRG: 395 | End: 2018-03-31
Attending: EMERGENCY MEDICINE | Admitting: HOSPITALIST
Payer: COMMERCIAL

## 2018-03-29 ENCOUNTER — APPOINTMENT (OUTPATIENT)
Dept: CT IMAGING | Facility: HOSPITAL | Age: 38
DRG: 395 | End: 2018-03-29
Attending: EMERGENCY MEDICINE
Payer: COMMERCIAL

## 2018-03-29 DIAGNOSIS — K61.1 PERIRECTAL ABSCESS: Primary | ICD-10-CM

## 2018-03-29 PROCEDURE — 72193 CT PELVIS W/DYE: CPT | Performed by: EMERGENCY MEDICINE

## 2018-03-29 PROCEDURE — 99223 1ST HOSP IP/OBS HIGH 75: CPT | Performed by: HOSPITALIST

## 2018-03-29 RX ORDER — ONDANSETRON 2 MG/ML
4 INJECTION INTRAMUSCULAR; INTRAVENOUS EVERY 6 HOURS PRN
Status: DISCONTINUED | OUTPATIENT
Start: 2018-03-29 | End: 2018-03-31

## 2018-03-29 RX ORDER — MORPHINE SULFATE 4 MG/ML
4 INJECTION, SOLUTION INTRAMUSCULAR; INTRAVENOUS ONCE
Status: COMPLETED | OUTPATIENT
Start: 2018-03-29 | End: 2018-03-29

## 2018-03-29 RX ORDER — ACETAMINOPHEN 325 MG/1
650 TABLET ORAL EVERY 6 HOURS PRN
Status: DISCONTINUED | OUTPATIENT
Start: 2018-03-29 | End: 2018-03-31

## 2018-03-29 RX ORDER — MORPHINE SULFATE 2 MG/ML
1 INJECTION, SOLUTION INTRAMUSCULAR; INTRAVENOUS EVERY 2 HOUR PRN
Status: DISCONTINUED | OUTPATIENT
Start: 2018-03-29 | End: 2018-03-31

## 2018-03-29 RX ORDER — LIDOCAINE HYDROCHLORIDE AND EPINEPHRINE 20; 5 MG/ML; UG/ML
20 INJECTION, SOLUTION EPIDURAL; INFILTRATION; INTRACAUDAL; PERINEURAL ONCE
Status: DISCONTINUED | OUTPATIENT
Start: 2018-03-29 | End: 2018-03-29

## 2018-03-29 RX ORDER — SODIUM PHOSPHATE, DIBASIC AND SODIUM PHOSPHATE, MONOBASIC 7; 19 G/133ML; G/133ML
1 ENEMA RECTAL ONCE AS NEEDED
Status: DISCONTINUED | OUTPATIENT
Start: 2018-03-29 | End: 2018-03-31

## 2018-03-29 RX ORDER — POLYETHYLENE GLYCOL 3350 17 G/17G
17 POWDER, FOR SOLUTION ORAL DAILY PRN
Status: DISCONTINUED | OUTPATIENT
Start: 2018-03-29 | End: 2018-03-31

## 2018-03-29 RX ORDER — MORPHINE SULFATE 2 MG/ML
2 INJECTION, SOLUTION INTRAMUSCULAR; INTRAVENOUS EVERY 2 HOUR PRN
Status: DISCONTINUED | OUTPATIENT
Start: 2018-03-29 | End: 2018-03-31

## 2018-03-29 RX ORDER — DOCUSATE SODIUM 100 MG/1
100 CAPSULE, LIQUID FILLED ORAL 2 TIMES DAILY
Status: DISCONTINUED | OUTPATIENT
Start: 2018-03-29 | End: 2018-03-31

## 2018-03-29 RX ORDER — SODIUM CHLORIDE 0.9 % (FLUSH) 0.9 %
3 SYRINGE (ML) INJECTION AS NEEDED
Status: DISCONTINUED | OUTPATIENT
Start: 2018-03-29 | End: 2018-03-31

## 2018-03-29 RX ORDER — POTASSIUM CHLORIDE 20 MEQ/1
40 TABLET, EXTENDED RELEASE ORAL ONCE
Status: COMPLETED | OUTPATIENT
Start: 2018-03-29 | End: 2018-03-29

## 2018-03-29 RX ORDER — LIDOCAINE AND PRILOCAINE 25; 25 MG/G; MG/G
CREAM TOPICAL ONCE
Status: COMPLETED | OUTPATIENT
Start: 2018-03-29 | End: 2018-03-29

## 2018-03-29 RX ORDER — MORPHINE SULFATE 4 MG/ML
4 INJECTION, SOLUTION INTRAMUSCULAR; INTRAVENOUS EVERY 2 HOUR PRN
Status: DISCONTINUED | OUTPATIENT
Start: 2018-03-29 | End: 2018-03-31

## 2018-03-29 RX ORDER — BISACODYL 10 MG
10 SUPPOSITORY, RECTAL RECTAL
Status: DISCONTINUED | OUTPATIENT
Start: 2018-03-29 | End: 2018-03-31

## 2018-03-29 NOTE — ED PROVIDER NOTES
Patient Seen in: [facility] AND [facility] Emergency Department    History   Patient presents with:  Abscess (integumentary)    Stated Complaint:  abscess    HPI    43 yo F with PMH hidradenitis c/b recurrent abscesses and s/p groin reconstruction secondary to hid Scar Revision to abd  No date: SKIN SURGERY      Comment: rt axilla flap, groin reconstruction  No date: TONSILLECTOMY    Medications :   Clindamycin HCl 300 MG Oral Cap,  Take 1 capsule (300 mg total) by mouth 3 (three) times daily.    gabapentin 100 MG Or crepitance or perineal extension/cellulitis. Musculoskeletal: No gross deformity. Neurological: Alert. Skin: Skin is warm. Left  Psychiatric: Cooperative. Nursing note and vitals reviewed.         ED Course     Labs Reviewed   BASIC METABOLIC PANEL (8) 11/27/2017, 7:09. [facility], CT PELVIS (CONTRAST ONLY) (CPT=72193), 1/14/2018, 11:25. [facility], CT PELVIS (CONTRAST ONLY) (CPT=72193), 11/25/2017, 18:59.   [facility], CT PELVIS (CONTRAST ONLY) (ZUI=63487 also extensive cutaneous thickening of the ventral pelvic wall, transversely oriented, extending to the region of the mons pubis. OTHER: No free air is seen in the pelvis. CONCLUSION:  1.  There is a recurrent large right perirectal abscess with prob

## 2018-03-29 NOTE — ED NOTES
First blood culture collected from right wrist. Patient refusing second blood culture. Patient states, \"I'm a hard stick. \" Dr Ragini Cueva was notified that patient was refusing.

## 2018-03-29 NOTE — PROGRESS NOTES
Zosyn (piperacillin/tazobactam) 3.375g IV once was ordered for Alvotise David Reasons. Body mass index is 32.78 kg/m².   Wt Readings from Last 6 Encounters:  03/29/18 : 100.7 kg (222 lb)  03/20/18 : 100.2 kg (221 lb)  03/04/18 : 99.5 kg (219 lb 4.8 oz)  03/0

## 2018-03-30 ENCOUNTER — TELEPHONE (OUTPATIENT)
Dept: INTERNAL MEDICINE CLINIC | Facility: CLINIC | Age: 38
End: 2018-03-30

## 2018-03-30 PROCEDURE — 99233 SBSQ HOSP IP/OBS HIGH 50: CPT | Performed by: HOSPITALIST

## 2018-03-30 RX ORDER — HYDROCODONE BITARTRATE AND ACETAMINOPHEN 5; 325 MG/1; MG/1
2 TABLET ORAL EVERY 6 HOURS PRN
Status: DISCONTINUED | OUTPATIENT
Start: 2018-03-30 | End: 2018-03-31

## 2018-03-30 RX ORDER — METRONIDAZOLE 500 MG/100ML
500 INJECTION, SOLUTION INTRAVENOUS EVERY 8 HOURS
Status: DISCONTINUED | OUTPATIENT
Start: 2018-03-30 | End: 2018-03-31

## 2018-03-30 RX ORDER — HYDROCODONE BITARTRATE AND ACETAMINOPHEN 5; 325 MG/1; MG/1
1 TABLET ORAL EVERY 6 HOURS PRN
Status: DISCONTINUED | OUTPATIENT
Start: 2018-03-30 | End: 2018-03-31

## 2018-03-30 NOTE — TELEPHONE ENCOUNTER
See FYI below; Luna went to ER yesterday 3/29. ED to [facility] Admission for Perirectal Abscess.

## 2018-03-30 NOTE — PROGRESS NOTES
[facility] dosing service    Initial Pharmacokinetic Consult for Vancomycin Dosing     Mirian Valerio is a 42year old female admitted on 3/29 who is being treated for cellulitis, perirectal abscess/fistula.   Pt has hydradenitis suppurativa w/ recurr Pharmacy ordered Vancomycin trough level prior to 4th dose on 3/31 at 0430. Goal trough level 15-20 ug/mL until abscess is drained, at which time lower trough of 10-15 would be sufficient.     3.  Pharmacy will need BUN/Scr daily while on Vancomycin to as

## 2018-03-30 NOTE — TELEPHONE ENCOUNTER
Pt's mother wanted to let  know that pt is in [facility].     To Nursing as Aurelio Cohn

## 2018-03-30 NOTE — PROGRESS NOTES
[place] [facility] - [facility]    Progress Note    Kayla Webb Patient Status:  Inpatient    1980 MRN [id number]   Location [facility] 4W/SW/SE Attending Sandra Duffy, [address] [facility] Day # 1 PCP Pierre De Los Santos DO       Subjective: (PF) 4 MG/ML injection 4 mg 4 mg Intravenous Q2H PRN   docusate sodium (COLACE) cap 100 mg 100 mg Oral BID   PEG 3350 (MIRALAX) powder packet 17 g 17 g Oral Daily PRN   magnesium hydroxide (MILK OF MAGNESIA) 400 MG/5ML suspension 30 mL 30 mL Oral Daily PRN  03/30/2018    03/29/2018    03/06/2018       Recent Labs   Lab  03/29/18   1417  03/30/18   0503   GLU  86  99   BUN  4*  4*   CREATSERUM  0.92  0.79   GFRAA  >60  >60   GFRNAA  >60  >60   CA  8.9  8.5   NA  135*  135*   K  3.7  3.9

## 2018-03-30 NOTE — CONSULTS
INFECTIOUS DISEASE CONSULT NOTE    Mirian Car Patient Status:  Inpatient    1980 MRN [id number]   Location [facility] 4W/SW/SE Attending Maryln Denver, [address] [facility] Day # 1 PCP OR  No date: CORRECT Gagan Casas METHODS  10/2016: DRAIN PILONIDAL CYST COMPLIC  39/58/3408: EGD N/A      Comment: Procedure: ESOPHAGOGASTRODUODENOSCOPY (EGD);                  Surgeon: Toño Jimenez MD;  Location: [facility]                OR  2006: KNEE SURG (ZOFRAN) injection 4 mg, 4 mg, Intravenous, Q6H PRN  •  morphINE sulfate (PF) 2 MG/ML injection 1 mg, 1 mg, Intravenous, Q2H PRN **OR** morphINE sulfate (PF) 2 MG/ML injection 2 mg, 2 mg, Intravenous, Q2H PRN **OR** morphINE sulfate (PF) 4 MG/ML injection No rhonchi. Cardiovascular: RRR  Abdomen: Soft, nontender, nondistended.    : perirectal area R lower medial buttock with significant ttp, fluctuant, mild erythma   And warmth; inguinal ulcers healed  Musculoskeletal: No edema noted  Integument: No lesio increased pain with improved R buttock symptoms. Also with increasing L axillary pain. Admitted 11/8-11 with bilateral buttock cellulitis and abscess. Was discharged on daptomycin and invanz.  Followed by Dr Max Royal, seen in office few weeks ago, changed t perirectal abscess and fistula January 2018  - CT Pelvis 1/14/18 with L > R paramedian gluteal fold thickening with extensive surrounding inflammation with superimposed 2.5 x 1.0 x 1.8 cm rim-enhancing fluid collection in L paramedial gluteal fold suspicio

## 2018-03-30 NOTE — CM/SW NOTE
3/30M-[facility]  informed this Writer that the Patient might need  need IV antibiotics upon discharge. The Patient was seen at bedside.  The Patient reside with her boyfruiend and 11year old son in [place] in an apartment building.   Prior to hospitalizati

## 2018-03-30 NOTE — PROGRESS NOTES
GS  Consult to follow   Well known to me  Recurrent rectal fistulas and abscesses and severe hidradenitis  Self draining abscess   Refuses drainage   Continue antibiotics

## 2018-03-30 NOTE — H&P
[facility]    PATIENT'S NAME: Sarah Alcala   ATTENDING PHYSICIAN: Zo Martinez MD   PATIENT ACCOUNT#:   [id number]    LOCATION:  [address] [place] [address] RECORD #:   [id number]       YOB: 1980  ADMISSION DATE:       03/29 review of systems negative. PHYSICAL EXAMINATION:    GENERAL:  Alert and oriented time, place, and person. Moderate distress. VITAL SIGNS:  Temperature 97.2, pulse 93, respiratory 18, blood pressure 126/83, pulse ox 100% on room air.   HEENT:  [place]

## 2018-03-31 VITALS
DIASTOLIC BLOOD PRESSURE: 69 MMHG | HEART RATE: 83 BPM | RESPIRATION RATE: 16 BRPM | TEMPERATURE: 99 F | WEIGHT: 222 LBS | OXYGEN SATURATION: 100 % | HEIGHT: 69 IN | BODY MASS INDEX: 32.88 KG/M2 | SYSTOLIC BLOOD PRESSURE: 120 MMHG

## 2018-03-31 PROCEDURE — 99239 HOSP IP/OBS DSCHRG MGMT >30: CPT | Performed by: HOSPITALIST

## 2018-03-31 PROCEDURE — 02HV33Z INSERTION OF INFUSION DEVICE INTO SUPERIOR VENA CAVA, PERCUTANEOUS APPROACH: ICD-10-PCS | Performed by: HOSPITALIST

## 2018-03-31 RX ORDER — HYDROCODONE BITARTRATE AND ACETAMINOPHEN 5; 325 MG/1; MG/1
2 TABLET ORAL EVERY 6 HOURS PRN
Qty: 45 TABLET | Refills: 0 | Status: SHIPPED | OUTPATIENT
Start: 2018-03-31 | End: 2018-04-16 | Stop reason: ALTCHOICE

## 2018-03-31 RX ORDER — PSEUDOEPHEDRINE HCL 30 MG
100 TABLET ORAL 2 TIMES DAILY PRN
Qty: 30 CAPSULE | Refills: 0 | Status: SHIPPED | OUTPATIENT
Start: 2018-03-31 | End: 2018-04-16 | Stop reason: ALTCHOICE

## 2018-03-31 RX ORDER — SODIUM CHLORIDE 0.9 % (FLUSH) 0.9 %
10 SYRINGE (ML) INJECTION AS NEEDED
Status: DISCONTINUED | OUTPATIENT
Start: 2018-03-31 | End: 2018-03-31

## 2018-03-31 RX ORDER — LIDOCAINE HYDROCHLORIDE 10 MG/ML
0.5 INJECTION, SOLUTION INFILTRATION; PERINEURAL ONCE AS NEEDED
Status: DISCONTINUED | OUTPATIENT
Start: 2018-03-31 | End: 2018-03-31

## 2018-03-31 RX ORDER — SODIUM CHLORIDE 9 MG/ML
INJECTION, SOLUTION INTRAVENOUS
Status: COMPLETED
Start: 2018-03-31 | End: 2018-03-31

## 2018-03-31 RX ORDER — POTASSIUM CHLORIDE 20 MEQ/1
40 TABLET, EXTENDED RELEASE ORAL ONCE
Status: DISCONTINUED | OUTPATIENT
Start: 2018-03-31 | End: 2018-03-31

## 2018-03-31 NOTE — PROGRESS NOTES
[facility] dosing service    Follow-up Pharmacokinetic Consult for Vancomycin Dosing     Mirian Camejo December is a 42year old female admitted on 3/29 who is being treated for cellulitis/abscesses. Patient is on day 3 of Vancomycin 1.5 gm IV Q 12 hours. probable transsphincteric fistula. A probable fistulous tract to the right ischioanal fossa is again seen. There is cutaneous thickening along the right gluteal fold. 2. Small fine free pelvic fluid may be physiologic.      3. Bilateral Essure coils are

## 2018-03-31 NOTE — PROGRESS NOTES
GS    Feels ok     Still draining   No increase in fluctuation or erythema  Plan:   Discharge on anti-biotics             Baths BID             To call me on Monday to arrange for colo-rectal surgeon at [facility] Landy MOSQUEDA  Discussed with Dr. Sigrid Mclaughlin

## 2018-03-31 NOTE — PROGRESS NOTES
[facility] ID PROGRESS NOTE    Mirian Best Patient Status:  Inpatient    1980 MRN [id number]   Location [facility] 4W/SW/SE Attending Teresa Rubin, [address] [facility] Day # 2 PCP Grace Jennings,      Subjective:  Awake, still having kimberly plastic surgeon including bilateral axilla and pelvic region most recently March 2016 of the right axilla including flap and skin grafting to Remicade therapy in the past but not currently on specific therapy recently admitted to [address] [place] [address] 10/21 - 10/26 with inc for possible recurrent abscess. Started on cefoxitin with noted neutropenia. Discharged on cefadroxil and bactrim x10 days with resolution.  Now admitted 11/25/17 after major corrective flap surgery involving both groins at [facility] by Dr. Miko Voss about 5-6 week w/concern for perianal fistula. S/p I&D 10/23 with cx Staph not aureus and bacteroides not fragilis; also underwent intra-op colonoscopy w/o fistula. Repeat CT pelvis 11/8 with possible fistula or abscess, s/p prolonged course iv abx, daptomycin.    # Pako Frost

## 2018-03-31 NOTE — CM/SW NOTE
2:55pm Update- Pt is agreeable to attend [facility], which can be arranged for her to be dc'd to home today. PT has not yet received today's dose of invanz. SW obtained script with labs. Pt has a picc line.  This information was faxed to Raeann Morse

## 2018-04-01 ENCOUNTER — HOSPITAL ENCOUNTER (OUTPATIENT)
Facility: HOSPITAL | Age: 38
Discharge: HOME | End: 2018-04-01
Attending: INTERNAL MEDICINE | Admitting: INTERNAL MEDICINE
Payer: COMMERCIAL

## 2018-04-02 ENCOUNTER — HOSPITAL ENCOUNTER (OUTPATIENT)
Facility: HOSPITAL | Age: 38
Discharge: HOME | End: 2018-04-02
Attending: INTERNAL MEDICINE | Admitting: INTERNAL MEDICINE
Payer: COMMERCIAL

## 2018-04-02 VITALS
DIASTOLIC BLOOD PRESSURE: 77 MMHG | TEMPERATURE: 98 F | SYSTOLIC BLOOD PRESSURE: 121 MMHG | RESPIRATION RATE: 16 BRPM | HEART RATE: 86 BPM

## 2018-04-02 PROCEDURE — 80053 COMPREHEN METABOLIC PANEL: CPT | Performed by: INTERNAL MEDICINE

## 2018-04-02 PROCEDURE — 85025 COMPLETE CBC W/AUTO DIFF WBC: CPT | Performed by: INTERNAL MEDICINE

## 2018-04-02 PROCEDURE — 85652 RBC SED RATE AUTOMATED: CPT | Performed by: INTERNAL MEDICINE

## 2018-04-02 PROCEDURE — 3E04329 INTRODUCTION OF OTHER ANTI-INFECTIVE INTO CENTRAL VEIN, PERCUTANEOUS APPROACH: ICD-10-PCS | Performed by: INTERNAL MEDICINE

## 2018-04-02 PROCEDURE — A4216 STERILE WATER/SALINE, 10 ML: HCPCS

## 2018-04-02 PROCEDURE — 96374 THER/PROPH/DIAG INJ IV PUSH: CPT

## 2018-04-02 RX ORDER — 0.9 % SODIUM CHLORIDE 0.9 %
VIAL (ML) INJECTION
Status: DISCONTINUED
Start: 2018-04-02 | End: 2018-04-02

## 2018-04-02 NOTE — PROGRESS NOTES
Patient arrived via wheelchair to [facility] for iv antibiotic infusion. Single lumen PICC line in place to right upper arm with positive blood return obtained upon flushing. Labs drawn and sent for analysis.  Lower right forearm below PICC line is slightly redden

## 2018-04-03 ENCOUNTER — TELEPHONE (OUTPATIENT)
Dept: INTERNAL MEDICINE UNIT | Facility: HOSPITAL | Age: 38
End: 2018-04-03

## 2018-04-03 ENCOUNTER — OFFICE VISIT (OUTPATIENT)
Dept: HEMATOLOGY/ONCOLOGY | Facility: HOSPITAL | Age: 38
End: 2018-04-03
Attending: INTERNAL MEDICINE
Payer: COMMERCIAL

## 2018-04-03 VITALS
SYSTOLIC BLOOD PRESSURE: 123 MMHG | HEART RATE: 83 BPM | TEMPERATURE: 99 F | DIASTOLIC BLOOD PRESSURE: 81 MMHG | RESPIRATION RATE: 16 BRPM

## 2018-04-03 DIAGNOSIS — K61.1 PERIRECTAL ABSCESS: Primary | ICD-10-CM

## 2018-04-03 PROCEDURE — A4216 STERILE WATER/SALINE, 10 ML: HCPCS

## 2018-04-03 PROCEDURE — 96365 THER/PROPH/DIAG IV INF INIT: CPT

## 2018-04-03 RX ORDER — 0.9 % SODIUM CHLORIDE 0.9 %
VIAL (ML) INJECTION
Status: DISCONTINUED
Start: 2018-04-03 | End: 2018-04-03

## 2018-04-03 NOTE — TELEPHONE ENCOUNTER
You will have to apologize to her and tell her I can see her today, maybe she can see the next available physician tomorrow since I am not available on Wednesdays, I can see her either Thursday or Friday, but he can use an MD approval for the spots or fit

## 2018-04-03 NOTE — PROGRESS NOTES
Pt to infusion for daily Invanz for perirectal abscess. Pt denies pain, n/v, fevers, diarrhea. Pt has had previous outpatient infusions with Invanz - aware of common SE. PICC flushed with good blood return.   Pt appeared to tolerate Invanz over 30 minute

## 2018-04-03 NOTE — TELEPHONE ENCOUNTER
Arm is red, super swollen, & looks like there is fluid under skin from the IV she got while in the [address] [place] [address]. She has complained 3 times to hospital staff & all they will do is apply warm compresses. Patient wants to be seen today - is in a lot of pain.

## 2018-04-03 NOTE — TELEPHONE ENCOUNTER
Pt discharged from [facility] on 3/31/18 . Please call to schedule follow up with Primary Care Physician.    Thanks

## 2018-04-03 NOTE — DISCHARGE SUMMARY
[place] [facility] - [facility]    Discharge Summary    Mirian Camejo December Patient Status:  Inpatient    1980 MRN [id number]   Location [facility] 4W/SW/SE Attending No att. providers found   Hosp Day # 2 PCP Michael Sands, DO     Date 27-year-old [place] American female with known past medical history of hidradenitis suppurativa who was hospitalized recently with leg cellulitis.   For the last 4 days, she has been having intractable pain and swelling in the right perianal/medial right bu from L buttock incision with increased pain with improved R buttock symptoms. Also with increasing L axillary pain. Admitted 11/8-11 with bilateral buttock cellulitis and abscess. Was discharged on daptomycin and invanz.  Followed by Dr Elidia De Souza, seen in of abdominal wall cellulitis with perirectal abscess and fistula January 2018  - CT Pelvis 1/14/18 with L > R paramedian gluteal fold thickening with extensive surrounding inflammation with superimposed 2.5 x 1.0 x 1.8 cm rim-enhancing fluid collection in L p constipation. Quantity:  30 capsule  Refills:  0     ertapenem 1 g 1 g in sodium chloride 0.9 % 100 mL      Inject 1 g into the vein daily.    Refills:  0     HYDROcodone-acetaminophen 5-325 MG Tabs  Commonly known as:  NORCO      Take 2 tablets by mouth

## 2018-04-03 NOTE — TELEPHONE ENCOUNTER
Spoke to patient and she informed the forearm is not any better and will be heading over to immediate care. Did convey over MD recommendation. Patient verbalized understanding.

## 2018-04-03 NOTE — TELEPHONE ENCOUNTER
Patient was admitted to the hospital last Thursday 3/29/18. IV on her right forearm was inserted on thursday evening and it has been working fine until friday when she noticed that her arm turned red and started hurting.  Patient has been getting IV antibio

## 2018-04-04 ENCOUNTER — APPOINTMENT (OUTPATIENT)
Dept: ULTRASOUND IMAGING | Facility: HOSPITAL | Age: 38
End: 2018-04-04
Attending: PHYSICIAN ASSISTANT
Payer: COMMERCIAL

## 2018-04-04 ENCOUNTER — HOSPITAL ENCOUNTER (EMERGENCY)
Facility: HOSPITAL | Age: 38
Discharge: HOME | End: 2018-04-04
Attending: PHYSICIAN ASSISTANT
Payer: COMMERCIAL

## 2018-04-04 ENCOUNTER — OFFICE VISIT (OUTPATIENT)
Dept: HEMATOLOGY/ONCOLOGY | Facility: HOSPITAL | Age: 38
End: 2018-04-04
Attending: INTERNAL MEDICINE
Payer: COMMERCIAL

## 2018-04-04 VITALS
HEART RATE: 83 BPM | SYSTOLIC BLOOD PRESSURE: 130 MMHG | TEMPERATURE: 98 F | DIASTOLIC BLOOD PRESSURE: 80 MMHG | RESPIRATION RATE: 16 BRPM

## 2018-04-04 VITALS
SYSTOLIC BLOOD PRESSURE: 133 MMHG | RESPIRATION RATE: 18 BRPM | HEART RATE: 77 BPM | OXYGEN SATURATION: 98 % | DIASTOLIC BLOOD PRESSURE: 87 MMHG | TEMPERATURE: 98 F

## 2018-04-04 DIAGNOSIS — K61.1 PERIRECTAL ABSCESS: Primary | ICD-10-CM

## 2018-04-04 DIAGNOSIS — Z86.2 HISTORY OF ANEMIA: ICD-10-CM

## 2018-04-04 DIAGNOSIS — I80.8 THROMBOPHLEBITIS OF CEPHALIC VEIN: Primary | ICD-10-CM

## 2018-04-04 PROCEDURE — 99284 EMERGENCY DEPT VISIT MOD MDM: CPT

## 2018-04-04 PROCEDURE — 93971 EXTREMITY STUDY: CPT | Performed by: PHYSICIAN ASSISTANT

## 2018-04-04 PROCEDURE — 80048 BASIC METABOLIC PNL TOTAL CA: CPT | Performed by: PHYSICIAN ASSISTANT

## 2018-04-04 PROCEDURE — 96365 THER/PROPH/DIAG IV INF INIT: CPT

## 2018-04-04 PROCEDURE — 36415 COLL VENOUS BLD VENIPUNCTURE: CPT

## 2018-04-04 PROCEDURE — A4216 STERILE WATER/SALINE, 10 ML: HCPCS

## 2018-04-04 PROCEDURE — 85025 COMPLETE CBC W/AUTO DIFF WBC: CPT | Performed by: PHYSICIAN ASSISTANT

## 2018-04-04 RX ORDER — 0.9 % SODIUM CHLORIDE 0.9 %
VIAL (ML) INJECTION
Status: DISCONTINUED
Start: 2018-04-04 | End: 2018-04-04

## 2018-04-04 RX ORDER — IBUPROFEN 600 MG/1
600 TABLET ORAL ONCE
Status: COMPLETED | OUTPATIENT
Start: 2018-04-04 | End: 2018-04-04

## 2018-04-04 NOTE — ED NOTES
Rec'd patient sitting on cart with complaints of pain and swelling to right forearm onset Saturday.  was seen here on Thursday where there was an IV to the arm where she was getting abx.  States area became warm and tender and now has become swollen

## 2018-04-04 NOTE — PROGRESS NOTES
Pt to infusion area at 1350 for Invanz. Picc line right arm with excellent blood return. Pt c/o pain right forearm  Site does appear swollen, slightly red and is warm to touch.  Pt states the pain started while she was in the hospital and had an IV in that

## 2018-04-04 NOTE — ED INITIAL ASSESSMENT (HPI)
Pt was admitted last week for abscess. States she is still receiving IV antibiotics thru a PICC line. Pt here with swelling and redness to rt forearm where IV was placed during admission.

## 2018-04-04 NOTE — ED PROVIDER NOTES
Patient Seen in: [facility] AND [facility] Emergency Department    History   Patient presents with:  Rash Skin Problem (integumentary)    Stated Complaint: right arrm swelling    HPI    66-year-old female presents with chief complaint of right upper extremity sw OR  2006: KNEE SURGERY Left      Comment: cyst removal  No date: NEEDLE BIOPSY LEFT  5/30/13: OTHER SURGICAL HISTORY      Comment: Exam under anthesthesia , labial drainage and                irrigation with lavage at [facility] - DR YARIEL BARBOUR  1/18/17: OTHER S °F (36.5 °C) (Oral)   Resp 18   SpO2 98%      PULSE OX within normal limits on room air as interpreted by this provider. Constitutional: The patient is cooperative. Appears well-developed and well-nourished. No acute distress.   Psychological: Alert, No Labs Reviewed   BASIC METABOLIC PANEL (8) - Abnormal; Notable for the following:        Result Value    Glucose 103 (*)     BUN 5 (*)     BUN/CREA Ratio 5.3 (*)     All other components within normal limits   CBC W/ DIFFERENTIAL - Abnormal; Notable for List    START taking these medications    aspirin 325 MG Oral Tab EC  Take 1 tablet (325 mg total) by mouth daily.   Qty: 14 tablet Refills: 0

## 2018-04-05 ENCOUNTER — OFFICE VISIT (OUTPATIENT)
Dept: HEMATOLOGY/ONCOLOGY | Facility: HOSPITAL | Age: 38
End: 2018-04-05
Attending: INTERNAL MEDICINE
Payer: COMMERCIAL

## 2018-04-05 VITALS
HEART RATE: 69 BPM | TEMPERATURE: 99 F | RESPIRATION RATE: 16 BRPM | DIASTOLIC BLOOD PRESSURE: 69 MMHG | SYSTOLIC BLOOD PRESSURE: 128 MMHG

## 2018-04-05 DIAGNOSIS — K61.1 PERIRECTAL ABSCESS: Primary | ICD-10-CM

## 2018-04-05 PROCEDURE — 96365 THER/PROPH/DIAG IV INF INIT: CPT

## 2018-04-05 PROCEDURE — A4216 STERILE WATER/SALINE, 10 ML: HCPCS

## 2018-04-05 RX ORDER — 0.9 % SODIUM CHLORIDE 0.9 %
VIAL (ML) INJECTION
Status: DISCONTINUED
Start: 2018-04-05 | End: 2018-04-05

## 2018-04-05 NOTE — TELEPHONE ENCOUNTER
Discussed with patient. We will see tomorrow at 9:30. Patient known to have localized thrombosis of cephalic vein in the forearm. This was where a IV was when she was in the hospital.  I did speak to Dr. Joseph Feliz from infectious disease.   Patient will be

## 2018-04-05 NOTE — ED NOTES
Report received from Kristi Anguiano rn. Pt resting. Rn visualized swelling & erythema to rt forearm. Pt denies pain at this time. Will continue to monitor.

## 2018-04-05 NOTE — PROGRESS NOTES
Patient ambulatory to infusion with steady gait for iv invanz. PICC line in place to right upper arm with positive blood return noted upon flushing.  Patient reports going to emergency room yesterday for concern about swelling and pain in right lower forea

## 2018-04-05 NOTE — TELEPHONE ENCOUNTER
Pt. Is following up with Dr. Rigoberto Leventhal regarding her visit. PtChaz Nicole Began to the ER yesterday and had an ultrasound she had a blood clot where her IV was she was prescribed aspirin 325 mg tabs once daily.  She was discharged and was told it was no big deal and it was urbina

## 2018-04-06 ENCOUNTER — TELEPHONE (OUTPATIENT)
Dept: INTERNAL MEDICINE CLINIC | Facility: CLINIC | Age: 38
End: 2018-04-06

## 2018-04-06 ENCOUNTER — HOSPITAL ENCOUNTER (OUTPATIENT)
Dept: GENERAL RADIOLOGY | Facility: HOSPITAL | Age: 38
Discharge: HOME | End: 2018-04-06
Attending: INTERNAL MEDICINE
Payer: COMMERCIAL

## 2018-04-06 ENCOUNTER — OFFICE VISIT (OUTPATIENT)
Dept: INTERNAL MEDICINE CLINIC | Facility: CLINIC | Age: 38
End: 2018-04-06

## 2018-04-06 ENCOUNTER — OFFICE VISIT (OUTPATIENT)
Dept: HEMATOLOGY/ONCOLOGY | Facility: HOSPITAL | Age: 38
End: 2018-04-06
Attending: INTERNAL MEDICINE
Payer: COMMERCIAL

## 2018-04-06 VITALS
WEIGHT: 223.38 LBS | SYSTOLIC BLOOD PRESSURE: 120 MMHG | HEIGHT: 69 IN | OXYGEN SATURATION: 98 % | TEMPERATURE: 98 F | BODY MASS INDEX: 33.08 KG/M2 | DIASTOLIC BLOOD PRESSURE: 80 MMHG | HEART RATE: 71 BPM

## 2018-04-06 VITALS
TEMPERATURE: 98 F | SYSTOLIC BLOOD PRESSURE: 125 MMHG | HEART RATE: 74 BPM | RESPIRATION RATE: 16 BRPM | DIASTOLIC BLOOD PRESSURE: 74 MMHG

## 2018-04-06 DIAGNOSIS — I80.8 SUPERFICIAL PHLEBITIS OF ARM: Primary | ICD-10-CM

## 2018-04-06 DIAGNOSIS — K61.1 PERIRECTAL ABSCESS: ICD-10-CM

## 2018-04-06 DIAGNOSIS — D64.9 CHRONIC ANEMIA: ICD-10-CM

## 2018-04-06 DIAGNOSIS — K61.1 PERIRECTAL ABSCESS: Primary | ICD-10-CM

## 2018-04-06 DIAGNOSIS — Z45.2 PICC (PERIPHERALLY INSERTED CENTRAL CATHETER) IN PLACE: ICD-10-CM

## 2018-04-06 DIAGNOSIS — I80.8 SUPERFICIAL PHLEBITIS OF ARM: ICD-10-CM

## 2018-04-06 PROCEDURE — 99212 OFFICE O/P EST SF 10 MIN: CPT | Performed by: INTERNAL MEDICINE

## 2018-04-06 PROCEDURE — 96365 THER/PROPH/DIAG IV INF INIT: CPT

## 2018-04-06 PROCEDURE — 73090 X-RAY EXAM OF FOREARM: CPT | Performed by: INTERNAL MEDICINE

## 2018-04-06 PROCEDURE — A4216 STERILE WATER/SALINE, 10 ML: HCPCS

## 2018-04-06 PROCEDURE — 1111F DSCHRG MED/CURRENT MED MERGE: CPT | Performed by: INTERNAL MEDICINE

## 2018-04-06 PROCEDURE — 99214 OFFICE O/P EST MOD 30 MIN: CPT | Performed by: INTERNAL MEDICINE

## 2018-04-06 RX ORDER — 0.9 % SODIUM CHLORIDE 0.9 %
VIAL (ML) INJECTION
Status: DISCONTINUED
Start: 2018-04-06 | End: 2018-04-06

## 2018-04-06 NOTE — PROGRESS NOTES
Pt to infusion for daily Invanz for perirectal abscess. Pt denies pain, n/v, fevers, diarrhea. Pt has had previous outpatient infusions with Invanz - aware of common SE. PICC flushed with good blood return.   Pt appeared to tolerate Invanz over 30 mi

## 2018-04-06 NOTE — TELEPHONE ENCOUNTER
Pt was told to follow up with Dr Kristi Lancaster next Thursday or Friday (4/12 or 4/13)  Can pt be added to the schedule while working around her infusion schedule? Ok to use MD approval if needed?   Tasked to Dr Kristi Lancaster to advise    Please call pt to advise

## 2018-04-06 NOTE — PROGRESS NOTES
Parvin Diane is a 42year old female. HPI:   Patient presents with:  Hospital F/U: went to the ER 3/29 for right forearm pain and swelling near area where she had a PIV. She was admitted for a blood clot.  Had a right arm picc placed to continue for was noted that patient had an IV removed from the site of her symptoms 3 days ago. Pain scale is 10 out of 10 and patient confirms that she has severe pain in the right forearm area.   Examination showed mild erythema and nonpitting edema present in the ri disorder ([facility] 2014    hydradentitis suppurativa   • Blood in stool    • Bunion, right foot    • Cellulitis     right thigh 2015   • Cellulitis 06/14/2017   • Cellulitis, abdominal wall 11/25/2017   • Cyst of left breast     excisional biopsy   • Hemorrhoi from infectious disease next Tuesday. Patient to continue ertapenem. I will make arrangements Monday morning for patient to have follow-up venous Doppler to make sure there is no proximal extension of her superficial phlebitis.     2. Perirectal abscess

## 2018-04-08 ENCOUNTER — OFFICE VISIT (OUTPATIENT)
Dept: HEMATOLOGY/ONCOLOGY | Facility: HOSPITAL | Age: 38
End: 2018-04-08
Attending: INTERNAL MEDICINE
Payer: COMMERCIAL

## 2018-04-08 VITALS
RESPIRATION RATE: 16 BRPM | HEART RATE: 70 BPM | DIASTOLIC BLOOD PRESSURE: 71 MMHG | SYSTOLIC BLOOD PRESSURE: 122 MMHG | TEMPERATURE: 99 F

## 2018-04-08 DIAGNOSIS — K61.1 PERIRECTAL ABSCESS: Primary | ICD-10-CM

## 2018-04-08 PROCEDURE — 96365 THER/PROPH/DIAG IV INF INIT: CPT

## 2018-04-08 PROCEDURE — A4216 STERILE WATER/SALINE, 10 ML: HCPCS

## 2018-04-08 RX ORDER — 0.9 % SODIUM CHLORIDE 0.9 %
VIAL (ML) INJECTION
Status: DISPENSED
Start: 2018-04-08 | End: 2018-04-08

## 2018-04-09 ENCOUNTER — HOSPITAL ENCOUNTER (OUTPATIENT)
Dept: ULTRASOUND IMAGING | Facility: HOSPITAL | Age: 38
Discharge: HOME | End: 2018-04-09
Attending: INTERNAL MEDICINE
Payer: COMMERCIAL

## 2018-04-09 ENCOUNTER — TELEPHONE (OUTPATIENT)
Dept: INTERNAL MEDICINE CLINIC | Facility: CLINIC | Age: 38
End: 2018-04-09

## 2018-04-09 ENCOUNTER — OFFICE VISIT (OUTPATIENT)
Dept: HEMATOLOGY/ONCOLOGY | Facility: HOSPITAL | Age: 38
End: 2018-04-09
Attending: INTERNAL MEDICINE
Payer: COMMERCIAL

## 2018-04-09 VITALS
RESPIRATION RATE: 16 BRPM | SYSTOLIC BLOOD PRESSURE: 137 MMHG | DIASTOLIC BLOOD PRESSURE: 86 MMHG | HEART RATE: 88 BPM | TEMPERATURE: 98 F

## 2018-04-09 DIAGNOSIS — K61.1 PERIRECTAL ABSCESS: Primary | ICD-10-CM

## 2018-04-09 DIAGNOSIS — I82.619 CEPHALIC VEIN THROMBOSIS: Primary | ICD-10-CM

## 2018-04-09 DIAGNOSIS — I82.619 CEPHALIC VEIN THROMBOSIS: ICD-10-CM

## 2018-04-09 PROCEDURE — 96365 THER/PROPH/DIAG IV INF INIT: CPT

## 2018-04-09 PROCEDURE — 85652 RBC SED RATE AUTOMATED: CPT

## 2018-04-09 PROCEDURE — 80053 COMPREHEN METABOLIC PANEL: CPT

## 2018-04-09 PROCEDURE — A4216 STERILE WATER/SALINE, 10 ML: HCPCS

## 2018-04-09 PROCEDURE — 85025 COMPLETE CBC W/AUTO DIFF WBC: CPT

## 2018-04-09 PROCEDURE — 93971 EXTREMITY STUDY: CPT | Performed by: INTERNAL MEDICINE

## 2018-04-09 RX ORDER — 0.9 % SODIUM CHLORIDE 0.9 %
VIAL (ML) INJECTION
Status: DISCONTINUED
Start: 2018-04-09 | End: 2018-04-09

## 2018-04-09 NOTE — PROGRESS NOTES
Pt to infusion for daily Invanz to treat perirectal abscess. Arrived ambulating independently. Reports feeling well, offers no complaints. PICC dressing C/D/I, line flushing well with positive blood return noted. Labs collected per order.  Infusion tolerate

## 2018-04-09 NOTE — TELEPHONE ENCOUNTER
Please call ultrasound today when they open up at about 8 AM.  Please schedule venous Doppler right upper extremity. Patient going to the infusion center I believe roughly at about 10:30 AM.  Please see if they can do venous Doppler afterwards.   This woul
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
## 2018-04-09 NOTE — TELEPHONE ENCOUNTER
Called ultrasound and scheduled patient for STAT US Venous Doppler right forearm; Pt scheduled at 12 noon in the [facility] [address] cell phone number for call back of US results. Patient notified. Patient verbalized understanding.     Route

## 2018-04-10 ENCOUNTER — OFFICE VISIT (OUTPATIENT)
Dept: HEMATOLOGY/ONCOLOGY | Facility: HOSPITAL | Age: 38
End: 2018-04-10
Attending: INTERNAL MEDICINE
Payer: COMMERCIAL

## 2018-04-10 ENCOUNTER — TELEPHONE (OUTPATIENT)
Dept: HEMATOLOGY/ONCOLOGY | Facility: HOSPITAL | Age: 38
End: 2018-04-10

## 2018-04-10 VITALS
HEART RATE: 93 BPM | SYSTOLIC BLOOD PRESSURE: 128 MMHG | DIASTOLIC BLOOD PRESSURE: 76 MMHG | TEMPERATURE: 99 F | RESPIRATION RATE: 16 BRPM

## 2018-04-10 DIAGNOSIS — K61.1 PERIRECTAL ABSCESS: Primary | ICD-10-CM

## 2018-04-10 PROCEDURE — 96365 THER/PROPH/DIAG IV INF INIT: CPT

## 2018-04-10 PROCEDURE — A4216 STERILE WATER/SALINE, 10 ML: HCPCS

## 2018-04-10 RX ORDER — 0.9 % SODIUM CHLORIDE 0.9 %
VIAL (ML) INJECTION
Status: DISCONTINUED
Start: 2018-04-10 | End: 2018-04-10

## 2018-04-10 NOTE — TELEPHONE ENCOUNTER
Mirian stated she would be late for her infusion. I spoke with Marietta Campos, who stated the patient could come in at 2:30 instead of 11:30 for her infusion.  Thanks

## 2018-04-10 NOTE — PROGRESS NOTES
Pt to infusion for daily Invanz to treat perirectal abscess. Arrived ambulating independently. Reports feeling well, offers no complaints. Denies pain, fever or chills overnight. PICC dressing C/D/I, line flushing well with positive blood return noted. Ziyad Broderick  I

## 2018-04-11 ENCOUNTER — OFFICE VISIT (OUTPATIENT)
Dept: HEMATOLOGY/ONCOLOGY | Facility: HOSPITAL | Age: 38
End: 2018-04-11
Attending: INTERNAL MEDICINE
Payer: COMMERCIAL

## 2018-04-11 VITALS
TEMPERATURE: 98 F | HEART RATE: 81 BPM | RESPIRATION RATE: 16 BRPM | SYSTOLIC BLOOD PRESSURE: 136 MMHG | DIASTOLIC BLOOD PRESSURE: 75 MMHG

## 2018-04-11 DIAGNOSIS — K61.1 PERIRECTAL ABSCESS: Primary | ICD-10-CM

## 2018-04-11 PROCEDURE — 96365 THER/PROPH/DIAG IV INF INIT: CPT

## 2018-04-11 PROCEDURE — A4216 STERILE WATER/SALINE, 10 ML: HCPCS

## 2018-04-11 RX ORDER — 0.9 % SODIUM CHLORIDE 0.9 %
VIAL (ML) INJECTION
Status: DISCONTINUED
Start: 2018-04-11 | End: 2018-04-11

## 2018-04-11 NOTE — PROGRESS NOTES
Mirian presents for daily invanz for perirectal abscess. Denies fever/chills at home. Dr. Ruby Farnsworth. Picc dressing dry and intact, Picc line flushed with 10ml ns with positive blood return.  Invanz infused over 30 minutes, Mirian appeared to tolerate w

## 2018-04-12 ENCOUNTER — APPOINTMENT (OUTPATIENT)
Dept: HEMATOLOGY/ONCOLOGY | Facility: HOSPITAL | Age: 38
End: 2018-04-12
Attending: INTERNAL MEDICINE
Payer: COMMERCIAL

## 2018-04-13 ENCOUNTER — APPOINTMENT (OUTPATIENT)
Dept: HEMATOLOGY/ONCOLOGY | Facility: HOSPITAL | Age: 38
End: 2018-04-13
Attending: INTERNAL MEDICINE
Payer: COMMERCIAL

## 2018-04-13 NOTE — TELEPHONE ENCOUNTER
Pt said that she cannot make the 11 am appt today. She needs to take her antibiotic at 11am and cannot be in the office at the same time. Can she reschedule for next week?     To Nursing high

## 2018-04-14 ENCOUNTER — APPOINTMENT (OUTPATIENT)
Dept: HEMATOLOGY/ONCOLOGY | Facility: HOSPITAL | Age: 38
End: 2018-04-14
Attending: INTERNAL MEDICINE
Payer: COMMERCIAL

## 2018-04-15 ENCOUNTER — APPOINTMENT (OUTPATIENT)
Dept: HEMATOLOGY/ONCOLOGY | Facility: HOSPITAL | Age: 38
End: 2018-04-15
Attending: INTERNAL MEDICINE
Payer: COMMERCIAL

## 2018-04-16 ENCOUNTER — APPOINTMENT (OUTPATIENT)
Dept: HEMATOLOGY/ONCOLOGY | Facility: HOSPITAL | Age: 38
End: 2018-04-16
Attending: INTERNAL MEDICINE
Payer: COMMERCIAL

## 2018-04-16 ENCOUNTER — OFFICE VISIT (OUTPATIENT)
Dept: INTERNAL MEDICINE CLINIC | Facility: CLINIC | Age: 38
End: 2018-04-16

## 2018-04-16 VITALS
BODY MASS INDEX: 32.88 KG/M2 | TEMPERATURE: 99 F | DIASTOLIC BLOOD PRESSURE: 80 MMHG | HEIGHT: 69 IN | HEART RATE: 76 BPM | OXYGEN SATURATION: 99 % | WEIGHT: 222 LBS | SYSTOLIC BLOOD PRESSURE: 116 MMHG

## 2018-04-16 DIAGNOSIS — K61.1 PERIRECTAL ABSCESS: Primary | ICD-10-CM

## 2018-04-16 DIAGNOSIS — L73.2 HYDRADENITIS: ICD-10-CM

## 2018-04-16 DIAGNOSIS — I80.8 SUPERFICIAL THROMBOPHLEBITIS OF RIGHT UPPER EXTREMITY: ICD-10-CM

## 2018-04-16 PROCEDURE — 99214 OFFICE O/P EST MOD 30 MIN: CPT | Performed by: INTERNAL MEDICINE

## 2018-04-16 PROCEDURE — 99212 OFFICE O/P EST SF 10 MIN: CPT | Performed by: INTERNAL MEDICINE

## 2018-04-16 NOTE — PATIENT INSTRUCTIONS
1. Perirectal abscess  Stable cont with f/u surgery, cont on current ABX for 3 more weeks and in with dr Maritza Iyer, sounds like you'll be off work for at least the next 3 days    2. Hydradenitis  I will contact derm, dr Aftab Swan    3.  Superfi

## 2018-04-16 NOTE — PROGRESS NOTES
HPI:   Beatrice Smith is a 42year old female who presents for complains of: Patient presents with:  Wound: Follow-up, seeing dr Simon Faulkner and dr Christian Zazutea for the colorectal surgeon. Dr Giorgi Rosas, wants the abx for another 3 week.  seeing dr Giorgi Rosas before t deficits  Musculoskeletal exam: no arthritis appreciated, no obvious deformity    ASSESSMENT AND PLAN:   Mirian Ziyad Jackson is a 42year old female who presents with the followin.  Perirectal abscess  Stable cont with f/u surgery, cont on current ABX

## 2018-04-17 ENCOUNTER — APPOINTMENT (OUTPATIENT)
Dept: HEMATOLOGY/ONCOLOGY | Facility: HOSPITAL | Age: 38
End: 2018-04-17
Attending: INTERNAL MEDICINE
Payer: COMMERCIAL

## 2018-04-18 ENCOUNTER — APPOINTMENT (OUTPATIENT)
Dept: HEMATOLOGY/ONCOLOGY | Facility: HOSPITAL | Age: 38
End: 2018-04-18
Attending: INTERNAL MEDICINE
Payer: COMMERCIAL

## 2018-04-19 ENCOUNTER — APPOINTMENT (OUTPATIENT)
Dept: HEMATOLOGY/ONCOLOGY | Facility: HOSPITAL | Age: 38
End: 2018-04-19
Attending: INTERNAL MEDICINE
Payer: COMMERCIAL

## 2018-04-20 ENCOUNTER — APPOINTMENT (OUTPATIENT)
Dept: HEMATOLOGY/ONCOLOGY | Facility: HOSPITAL | Age: 38
End: 2018-04-20
Attending: INTERNAL MEDICINE
Payer: COMMERCIAL

## 2018-04-21 ENCOUNTER — APPOINTMENT (OUTPATIENT)
Dept: HEMATOLOGY/ONCOLOGY | Facility: HOSPITAL | Age: 38
End: 2018-04-21
Attending: INTERNAL MEDICINE
Payer: COMMERCIAL

## 2018-04-22 ENCOUNTER — APPOINTMENT (OUTPATIENT)
Dept: HEMATOLOGY/ONCOLOGY | Facility: HOSPITAL | Age: 38
End: 2018-04-22
Attending: INTERNAL MEDICINE
Payer: COMMERCIAL

## 2018-04-23 ENCOUNTER — APPOINTMENT (OUTPATIENT)
Dept: HEMATOLOGY/ONCOLOGY | Facility: HOSPITAL | Age: 38
End: 2018-04-23
Attending: INTERNAL MEDICINE
Payer: COMMERCIAL

## 2018-04-24 ENCOUNTER — APPOINTMENT (OUTPATIENT)
Dept: HEMATOLOGY/ONCOLOGY | Facility: HOSPITAL | Age: 38
End: 2018-04-24
Attending: INTERNAL MEDICINE
Payer: COMMERCIAL

## 2018-04-25 ENCOUNTER — APPOINTMENT (OUTPATIENT)
Dept: HEMATOLOGY/ONCOLOGY | Facility: HOSPITAL | Age: 38
End: 2018-04-25
Attending: INTERNAL MEDICINE
Payer: COMMERCIAL

## 2018-04-26 ENCOUNTER — APPOINTMENT (OUTPATIENT)
Dept: HEMATOLOGY/ONCOLOGY | Facility: HOSPITAL | Age: 38
End: 2018-04-26
Attending: INTERNAL MEDICINE
Payer: COMMERCIAL

## 2018-04-27 ENCOUNTER — APPOINTMENT (OUTPATIENT)
Dept: HEMATOLOGY/ONCOLOGY | Facility: HOSPITAL | Age: 38
End: 2018-04-27
Attending: INTERNAL MEDICINE
Payer: COMMERCIAL

## 2018-04-28 ENCOUNTER — APPOINTMENT (OUTPATIENT)
Dept: HEMATOLOGY/ONCOLOGY | Facility: HOSPITAL | Age: 38
End: 2018-04-28
Attending: INTERNAL MEDICINE
Payer: COMMERCIAL

## 2018-04-29 ENCOUNTER — APPOINTMENT (OUTPATIENT)
Dept: HEMATOLOGY/ONCOLOGY | Facility: HOSPITAL | Age: 38
End: 2018-04-29
Attending: INTERNAL MEDICINE
Payer: COMMERCIAL

## 2018-04-30 ENCOUNTER — APPOINTMENT (OUTPATIENT)
Dept: HEMATOLOGY/ONCOLOGY | Facility: HOSPITAL | Age: 38
End: 2018-04-30
Attending: INTERNAL MEDICINE
Payer: COMMERCIAL

## 2018-05-01 ENCOUNTER — APPOINTMENT (OUTPATIENT)
Dept: HEMATOLOGY/ONCOLOGY | Facility: HOSPITAL | Age: 38
End: 2018-05-01
Attending: INTERNAL MEDICINE

## 2018-05-02 ENCOUNTER — APPOINTMENT (OUTPATIENT)
Dept: HEMATOLOGY/ONCOLOGY | Facility: HOSPITAL | Age: 38
End: 2018-05-02
Attending: INTERNAL MEDICINE

## 2018-05-07 ENCOUNTER — TELEPHONE (OUTPATIENT)
Dept: INTERNAL MEDICINE CLINIC | Facility: CLINIC | Age: 38
End: 2018-05-07

## 2018-05-07 ENCOUNTER — OFFICE VISIT (OUTPATIENT)
Dept: INTERNAL MEDICINE CLINIC | Facility: CLINIC | Age: 38
End: 2018-05-07

## 2018-05-07 VITALS
SYSTOLIC BLOOD PRESSURE: 114 MMHG | BODY MASS INDEX: 32.88 KG/M2 | HEIGHT: 69 IN | HEART RATE: 80 BPM | DIASTOLIC BLOOD PRESSURE: 78 MMHG | TEMPERATURE: 98 F | WEIGHT: 222 LBS

## 2018-05-07 DIAGNOSIS — M54.42 ACUTE BILATERAL LOW BACK PAIN WITH BILATERAL SCIATICA: ICD-10-CM
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
## 2018-05-07 DIAGNOSIS — G43.109 MIGRAINE WITH AURA AND WITHOUT STATUS MIGRAINOSUS, NOT INTRACTABLE: ICD-10-CM

## 2018-05-07 DIAGNOSIS — M54.41 ACUTE BILATERAL LOW BACK PAIN WITH BILATERAL SCIATICA: ICD-10-CM

## 2018-05-07 DIAGNOSIS — S13.4XXA WHIPLASH INJURY TO NECK, INITIAL ENCOUNTER: ICD-10-CM

## 2018-05-07 PROCEDURE — 99214 OFFICE O/P EST MOD 30 MIN: CPT | Performed by: INTERNAL MEDICINE

## 2018-05-07 PROCEDURE — 99212 OFFICE O/P EST SF 10 MIN: CPT | Performed by: INTERNAL MEDICINE

## 2018-05-07 RX ORDER — CODEINE/BUTALBITAL/ASA/CAFFEIN 30-50-325
1-2 CAPSULE ORAL EVERY 4 HOURS PRN
Qty: 20 CAPSULE | Refills: 0 | Status: SHIPPED | OUTPATIENT
Start: 2018-05-07 | End: 2018-06-26
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
## 2018-05-09 ENCOUNTER — HOSPITAL ENCOUNTER (OUTPATIENT)
Dept: GENERAL RADIOLOGY | Facility: HOSPITAL | Age: 38
Discharge: HOME | End: 2018-05-09
Attending: INTERNAL MEDICINE
Payer: COMMERCIAL

## 2018-05-09 DIAGNOSIS — S13.4XXA WHIPLASH INJURY TO NECK, INITIAL ENCOUNTER: ICD-10-CM

## 2018-05-09 DIAGNOSIS — M54.42 ACUTE BILATERAL LOW BACK PAIN WITH BILATERAL SCIATICA: ICD-10-CM

## 2018-05-09 DIAGNOSIS — M54.41 ACUTE BILATERAL LOW BACK PAIN WITH BILATERAL SCIATICA: ICD-10-CM

## 2018-05-09 PROCEDURE — 72100 X-RAY EXAM L-S SPINE 2/3 VWS: CPT | Performed by: INTERNAL MEDICINE

## 2018-05-09 PROCEDURE — 72040 X-RAY EXAM NECK SPINE 2-3 VW: CPT | Performed by: INTERNAL MEDICINE

## 2018-05-11 ENCOUNTER — TELEPHONE (OUTPATIENT)
Dept: INTERNAL MEDICINE CLINIC | Facility: CLINIC | Age: 38
End: 2018-05-11
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
## 2018-05-16 ENCOUNTER — TELEPHONE (OUTPATIENT)
Dept: INTERNAL MEDICINE CLINIC | Facility: CLINIC | Age: 38
End: 2018-05-16
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
## 2018-05-16 DIAGNOSIS — G44.329 CHRONIC POST-TRAUMATIC HEADACHE, NOT INTRACTABLE: Primary | ICD-10-CM

## 2018-05-16 NOTE — TELEPHONE ENCOUNTER
Pt. Called stating she was told to set up physical therapy. She asked if she needed a referral and she was told no, so  she set up an appt. at [facility] in [facility].   She is there now and she was told she needs an order for the therapy.   Eveline Dominguez looked throu

## 2018-05-17 NOTE — TELEPHONE ENCOUNTER
Called patient and relayed DR. RED message , also that he will release x-ray results on [facility] - verbalized understanding Phone # for [phone] fax number [phone] order for PT faxed

## 2018-05-17 NOTE — TELEPHONE ENCOUNTER
Pt is calling to check on the status of her request for PT order,  Pt would like this done today   please call pt [phone]    Tasked to nursing

## 2018-05-17 NOTE — TELEPHONE ENCOUNTER
Pt. States that the cyclobenzaprine makes her loopy she prefers to have PT versus taking medication  Ph. # [phone]

## 2018-05-17 NOTE — TELEPHONE ENCOUNTER
I believe I already wrote orders for her at her last visit for physical therapy, print and use these, she should have a copy as well.

## 2018-06-26 ENCOUNTER — LAB ENCOUNTER (OUTPATIENT)
Dept: LAB | Age: 38
End: 2018-06-26
Attending: INTERNAL MEDICINE
Payer: COMMERCIAL

## 2018-06-26 ENCOUNTER — OFFICE VISIT (OUTPATIENT)
Dept: INTERNAL MEDICINE CLINIC | Facility: CLINIC | Age: 38
End: 2018-06-26

## 2018-06-26 ENCOUNTER — TELEPHONE (OUTPATIENT)
Dept: INTERNAL MEDICINE CLINIC | Facility: CLINIC | Age: 38
End: 2018-06-26

## 2018-06-26 VITALS
SYSTOLIC BLOOD PRESSURE: 110 MMHG | HEART RATE: 82 BPM | BODY MASS INDEX: 33 KG/M2 | OXYGEN SATURATION: 98 % | WEIGHT: 222 LBS | DIASTOLIC BLOOD PRESSURE: 70 MMHG | TEMPERATURE: 98 F

## 2018-06-26 DIAGNOSIS — R00.2 HEART PALPITATIONS: ICD-10-CM

## 2018-06-26 DIAGNOSIS — Z86.59 HISTORY OF DEPRESSION: ICD-10-CM

## 2018-06-26 DIAGNOSIS — R00.2 HEART PALPITATIONS: Primary | ICD-10-CM

## 2018-06-26 DIAGNOSIS — L73.2 HYDRADENITIS: ICD-10-CM

## 2018-06-26 PROCEDURE — 83735 ASSAY OF MAGNESIUM: CPT

## 2018-06-26 PROCEDURE — 80053 COMPREHEN METABOLIC PANEL: CPT

## 2018-06-26 PROCEDURE — 85025 COMPLETE CBC W/AUTO DIFF WBC: CPT

## 2018-06-26 PROCEDURE — 84443 ASSAY THYROID STIM HORMONE: CPT

## 2018-06-26 PROCEDURE — 99214 OFFICE O/P EST MOD 30 MIN: CPT | Performed by: INTERNAL MEDICINE

## 2018-06-26 PROCEDURE — 36415 COLL VENOUS BLD VENIPUNCTURE: CPT

## 2018-06-26 PROCEDURE — 93000 ELECTROCARDIOGRAM COMPLETE: CPT | Performed by: INTERNAL MEDICINE

## 2018-06-26 RX ORDER — METOPROLOL SUCCINATE 25 MG/1
12.5 TABLET, EXTENDED RELEASE ORAL DAILY
Qty: 45 TABLET | Refills: 1 | Status: SHIPPED | OUTPATIENT
Start: 2018-06-26 | End: 2019-06-29

## 2018-06-26 NOTE — TELEPHONE ENCOUNTER
To Dr. Kassandra Chua - see below - onset: 2 weeks ago. Pt currently staying with parents. Pt wakes up in AM with heart feeling like it is jumping out of chest.  Pt scared.   Pulse taken while on phone 63 - pt states there were double beats that came about every

## 2018-06-26 NOTE — PATIENT INSTRUCTIONS
1. Heart palpitations  I recommend we check some lab work, and think about the echocardiogram, try to set this up and I will call with results  Take the metoprolol 12.5 mg Mondays at the palpitations seem to be prominent, this should help, let me know if t
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
## 2018-06-26 NOTE — PROGRESS NOTES
HPI:   Kary Pino is a 42year old female who presents for complains of: Patient presents with:  Palpitations: Patient states \"my heart is racing\".   Patient states onset was 2 weeks ago, occassionally uses valsalva maneuvers and then improves  Pa seem to be prominent, this should help, let me know if they continue, or this does not help.   We may need a cardiologist, with ensuing Holter or event monitor if symptoms continue.  - ELECTROCARDIOGRAM, COMPLETE: Sinus rhythm at 61 bpm, RS are prime in V1,

## 2018-07-06 ENCOUNTER — TELEPHONE (OUTPATIENT)
Dept: INTERNAL MEDICINE CLINIC | Facility: CLINIC | Age: 38
End: 2018-07-06

## 2018-07-06 DIAGNOSIS — E83.42 HYPOMAGNESEMIA: Primary | ICD-10-CM

## 2018-07-09 ENCOUNTER — TELEPHONE (OUTPATIENT)
Dept: INTERNAL MEDICINE CLINIC | Facility: CLINIC | Age: 38
End: 2018-07-09

## 2018-07-09 NOTE — TELEPHONE ENCOUNTER
Patient is schedule for his her groin surgery on 8/3. If a pre-surgical was done at his last visit, it can be faxed to [phone], otherwise they will do one.     FYI - there is a telephone encounter from 7/6 that says a [employer] message was sent - we wer

## 2018-07-09 NOTE — TELEPHONE ENCOUNTER
Please advise - to DR. RED  ( do not see [facility] message)

## 2018-07-10 NOTE — TELEPHONE ENCOUNTER
Last time she was here we are dealing with palpitations, and waiting for her to get an echocardiogram, based on this information she should probably be in for a presurgical evaluation with me in the next few weeks to clear her for surgery, and encourage he

## 2018-07-11 NOTE — TELEPHONE ENCOUNTER
ESVIN spoke with Mirian who stated she is feeling much better after starting medication and reducing stress levels.  She states her upcoming surgery is Aug 3rd and the RN at [facility] is calling her tomorrow to schedule pre-operative screening, as they do their ow

## 2018-07-12 NOTE — TELEPHONE ENCOUNTER
Dionna Hernandez from [address] [facility] called  for 6/26 OV notes & lab results  Needed for pt's upcoming surgery on 8/3    Please send to fax# [phone]

## 2018-07-14 ENCOUNTER — APPOINTMENT (OUTPATIENT)
Dept: GENERAL RADIOLOGY | Facility: HOSPITAL | Age: 38
End: 2018-07-14
Attending: NURSE PRACTITIONER
Payer: COMMERCIAL

## 2018-07-14 ENCOUNTER — HOSPITAL ENCOUNTER (EMERGENCY)
Facility: HOSPITAL | Age: 38
Discharge: HOME | End: 2018-07-14
Payer: COMMERCIAL

## 2018-07-14 VITALS
RESPIRATION RATE: 18 BRPM | HEART RATE: 75 BPM | OXYGEN SATURATION: 99 % | TEMPERATURE: 100 F | SYSTOLIC BLOOD PRESSURE: 121 MMHG | WEIGHT: 220 LBS | BODY MASS INDEX: 32.58 KG/M2 | DIASTOLIC BLOOD PRESSURE: 96 MMHG | HEIGHT: 69 IN

## 2018-07-14 DIAGNOSIS — M25.562 ACUTE PAIN OF LEFT KNEE: Primary | ICD-10-CM

## 2018-07-14 PROCEDURE — 73560 X-RAY EXAM OF KNEE 1 OR 2: CPT | Performed by: NURSE PRACTITIONER

## 2018-07-14 PROCEDURE — 99283 EMERGENCY DEPT VISIT LOW MDM: CPT

## 2018-07-14 RX ORDER — HYDROCODONE BITARTRATE AND ACETAMINOPHEN 5; 325 MG/1; MG/1
2 TABLET ORAL ONCE
Status: COMPLETED | OUTPATIENT
Start: 2018-07-14 | End: 2018-07-14

## 2018-07-14 RX ORDER — IBUPROFEN 600 MG/1
600 TABLET ORAL ONCE
Status: COMPLETED | OUTPATIENT
Start: 2018-07-14 | End: 2018-07-14

## 2018-07-14 RX ORDER — IBUPROFEN 600 MG/1
600 TABLET ORAL EVERY 6 HOURS PRN
Qty: 30 TABLET | Refills: 0 | Status: SHIPPED | OUTPATIENT
Start: 2018-07-14 | End: 2018-07-21

## 2018-07-14 RX ORDER — HYDROCODONE BITARTRATE AND ACETAMINOPHEN 5; 325 MG/1; MG/1
1-2 TABLET ORAL EVERY 4 HOURS PRN
Qty: 10 TABLET | Refills: 0 | Status: SHIPPED | OUTPATIENT
Start: 2018-07-14 | End: 2018-07-21

## 2018-07-14 NOTE — ED INITIAL ASSESSMENT (HPI)
C/o left knee pain, swelling and difficulty bending knee for last \"few days\". Pt has hx bakers cyst behind that same knee.  Denies calf pain or SOB, no CP or redness noted

## 2018-07-14 NOTE — ED NOTES
The patient reports increased left knee soreness and limited ability to bend it. The patient denies recent long distance travel or history of blood clots. Peripheral pulses are present and palpable.

## 2018-07-15 NOTE — ED NOTES
The patient is cleared for discharge per Emergency Department provider. Discharge instructions reviewed with patient including when and how to follow up with healthcare providers and when to seek emergency care.  The patient dressed self and effectively us

## 2018-07-15 NOTE — ED PROVIDER NOTES
Patient Seen in: [facility] AND [facility] Emergency Department    History   Patient presents with:  Lower Extremity Injury (musculoskeletal)    Stated Complaint: \"left knee unable to bend\"     HPI  Patient presents into the emergency room for evaluation of left Jacqueline Hoskins MD;  Location: [address] [place] [address] MAIN                OR  2006: KNEE SURGERY Left      Comment: cyst removal  No date: NEEDLE BIOPSY LEFT  5/30/13: OTHER SURGICAL HISTORY      Comment: Exam under anthesthesia , labial drainage and                irrigation w tenderness to the MCL or LCL. No  erythema or warmth. Able to dorsiflex and plantarflex the foot, as well as straight leg raise left lower extremity off the cart. Flexion of the knee elicits discomfort. Skin: She is not diaphoretic.    Nursing note and

## 2018-07-20 ENCOUNTER — HOSPITAL ENCOUNTER (OUTPATIENT)
Dept: CARDIOLOGY CLINIC | Age: 38
End: 2018-07-20
Attending: INTERNAL MEDICINE
Payer: COMMERCIAL

## 2018-07-20 ENCOUNTER — HOSPITAL ENCOUNTER (OUTPATIENT)
Dept: CARDIOLOGY CLINIC | Age: 38
Discharge: HOME | End: 2018-07-20
Attending: INTERNAL MEDICINE
Payer: COMMERCIAL

## 2018-07-20 DIAGNOSIS — R00.2 HEART PALPITATIONS: ICD-10-CM

## 2018-07-20 PROCEDURE — 93306 TTE W/DOPPLER COMPLETE: CPT | Performed by: INTERNAL MEDICINE

## 2018-07-25 ENCOUNTER — HOSPITAL ENCOUNTER (INPATIENT)
Facility: HOSPITAL | Age: 38
LOS: 1 days | Discharge: HOME | DRG: 603 | End: 2018-07-26
Attending: EMERGENCY MEDICINE | Admitting: HOSPITALIST
Payer: COMMERCIAL

## 2018-07-25 DIAGNOSIS — L03.116 CELLULITIS OF LEFT LOWER EXTREMITY: Primary | ICD-10-CM

## 2018-07-25 LAB
ANION GAP SERPL CALC-SCNC: 5 MMOL/L (ref 0–18)
B-HCG UR QL: NEGATIVE
BASOPHILS # BLD: 0 K/UL (ref 0–0.2)
BASOPHILS NFR BLD: 1 %
BUN SERPL-MCNC: 9 MG/DL (ref 8–20)
BUN/CREAT SERPL: 10.2 (ref 10–20)
CALCIUM SERPL-MCNC: 9.4 MG/DL (ref 8.5–10.5)
CHLORIDE SERPL-SCNC: 105 MMOL/L (ref 95–110)
CO2 SERPL-SCNC: 25 MMOL/L (ref 22–32)
CREAT SERPL-MCNC: 0.88 MG/DL (ref 0.5–1.5)
EOSINOPHIL # BLD: 0 K/UL (ref 0–0.7)
EOSINOPHIL NFR BLD: 1 %
ERYTHROCYTE [DISTWIDTH] IN BLOOD BY AUTOMATED COUNT: 16.6 % (ref 11–15)
GLUCOSE SERPL-MCNC: 97 MG/DL (ref 70–99)
HCT VFR BLD AUTO: 31.6 % (ref 35–48)
HGB BLD-MCNC: 9.9 G/DL (ref 12–16)
LACTATE SERPL-SCNC: 0.6 MMOL/L (ref 0.5–2.2)
LYMPHOCYTES # BLD: 1.2 K/UL (ref 1–4)
LYMPHOCYTES NFR BLD: 13 %
MCH RBC QN AUTO: 22.2 PG (ref 27–32)
MCHC RBC AUTO-ENTMCNC: 31.4 G/DL (ref 32–37)
MCV RBC AUTO: 70.7 FL (ref 80–100)
MONOCYTES # BLD: 1 K/UL (ref 0–1)
MONOCYTES NFR BLD: 11 %
NEUTROPHILS # BLD AUTO: 6.8 K/UL (ref 1.8–7.7)
NEUTROPHILS NFR BLD: 75 %
OSMOLALITY UR CALC.SUM OF ELEC: 279 MOSM/KG (ref 275–295)
PLATELET # BLD AUTO: 278 K/UL (ref 140–400)
PMV BLD AUTO: 8.6 FL (ref 7.4–10.3)
POTASSIUM SERPL-SCNC: 4.2 MMOL/L (ref 3.3–5.1)
RBC # BLD AUTO: 4.47 M/UL (ref 3.7–5.4)
SODIUM SERPL-SCNC: 135 MMOL/L (ref 136–144)
WBC # BLD AUTO: 9 K/UL (ref 4–11)

## 2018-07-25 PROCEDURE — 99222 1ST HOSP IP/OBS MODERATE 55: CPT | Performed by: HOSPITALIST

## 2018-07-25 RX ORDER — HYDROCODONE BITARTRATE AND ACETAMINOPHEN 5; 325 MG/1; MG/1
1 TABLET ORAL EVERY 6 HOURS PRN
Status: DISCONTINUED | OUTPATIENT
Start: 2018-07-25 | End: 2018-07-25

## 2018-07-25 RX ORDER — METOPROLOL SUCCINATE 25 MG/1
12.5 TABLET, EXTENDED RELEASE ORAL DAILY PRN
Status: DISCONTINUED | OUTPATIENT
Start: 2018-07-25 | End: 2018-07-25

## 2018-07-25 RX ORDER — 0.9 % SODIUM CHLORIDE 0.9 %
VIAL (ML) INJECTION
Status: COMPLETED
Start: 2018-07-25 | End: 2018-07-25

## 2018-07-25 RX ORDER — HYDROCODONE BITARTRATE AND ACETAMINOPHEN 5; 325 MG/1; MG/1
1 TABLET ORAL ONCE
Status: COMPLETED | OUTPATIENT
Start: 2018-07-25 | End: 2018-07-25

## 2018-07-25 RX ORDER — HYDROCODONE BITARTRATE AND ACETAMINOPHEN 5; 325 MG/1; MG/1
1-2 TABLET ORAL EVERY 6 HOURS PRN
Status: DISCONTINUED | OUTPATIENT
Start: 2018-07-25 | End: 2018-07-26

## 2018-07-25 RX ORDER — METOPROLOL SUCCINATE 25 MG/1
12.5 TABLET, EXTENDED RELEASE ORAL DAILY PRN
Status: DISCONTINUED | OUTPATIENT
Start: 2018-07-25 | End: 2018-07-26

## 2018-07-25 RX ORDER — MORPHINE SULFATE 4 MG/ML
2 INJECTION, SOLUTION INTRAMUSCULAR; INTRAVENOUS ONCE
Status: COMPLETED | OUTPATIENT
Start: 2018-07-25 | End: 2018-07-25

## 2018-07-25 NOTE — ED NOTES
Left anterior thigh is warm to touch, redness noted and traced. Awaiting MD to see patient for further orders.

## 2018-07-25 NOTE — ED INITIAL ASSESSMENT (HPI)
Patient complain of left thigh pain. The affected area is warm to touch. Patient has a history of cellulitis.

## 2018-07-25 NOTE — PLAN OF CARE
Problem: Patient Centered Care  Goal: Patient preferences are identified and integrated in the patient's plan of care  Interventions:  - What would you like us to know as we care for you?  History of cellulitis, hidradenitis with reconstructive surgery  - P

## 2018-07-25 NOTE — ED NOTES
Per Dr. Harris Plan, no blood or IV is needed at this time - waiting to hear back from infectious disease.

## 2018-07-25 NOTE — H&P
[address] [facility] Patient Status:  Inpatient    1980 MRN [id number]   Location [facility] 5SW/SE Attending Jennifer Mckeon MD   Hosp Day # 0 PCP Renee Files, DO     Date:  / Ansley Foley MD;  Location: [address] [place] [address] MAIN OR  No date: CORRECT Annette Madden METHODS  10/2016: DRAIN PILONIDAL CYST COMPLIC  87/25/3793: EGD N/A      Comment: Procedure: ESOPHAGOGASTRODUODENOSCOPY (EGD);                  Surgeon: Valerie Pickens MD;  Lili Reinoso Signs: Blood pressure 112/65, pulse 67, temperature 99 °F (37.2 °C), temperature source Oral, resp. rate 18, height 5' 9\" (1.753 m), weight 220 lb (99.8 kg), SpO2 97 %, not currently breastfeeding. General:  Alert and oriented.   Comfortable resting i suppurative  S/p reconstructive surgeries in the past    Migraine headaches  Anxiety with palpitations  -metoprolol prn    DVT prophylaxis: ambulation, SCD    D/w ID  D/w pt at length the above, answered all questions, patient is agreeable with proposed ma

## 2018-07-25 NOTE — ED PROVIDER NOTES
Patient Seen in: [facility] AND [facility] Emergency Department    History   Patient presents with:  Pain (neurologic)    Stated Complaint: pain on left thigh     HPI    64-year-old female with history of hidradenitis, cellulitis, here with complaints of left th Surgeon: Gabby Maynard MD;  Location: [address] [place] [address] MAIN                OR  2006: KNEE SURGERY Left      Comment: cyst removal  No date: NEEDLE BIOPSY LEFT  5/30/13: OTHER SURGICAL HISTORY      Comment: Exam under anthesthesia , labial drainage and                irr Wt 99.8 kg   SpO2 99%   BMI 32.49 kg/m²         Physical Exam   Constitutional: She is oriented to person, place, and time. She appears well-developed and well-nourished. No distress. HENT:   Head: Normocephalic and atraumatic.    Mouth/Throat: Oropharynx Glucose 97 70 - 99 mg/dL   Sodium 135 (L) 136 - 144 mmol/L   Potassium 4.2 3.3 - 5.1 mmol/L   Chloride 105 95 - 110 mmol/L   CO2 25 22 - 32 mmol/L   BUN 9 8 - 20 mg/dL   Creatinine 0.88 0.50 - 1.50 mg/dL   Calcium, Total 9.4 8.5 - 10.5 mg/dL   BUN/CREA R reviewed available prior medical records for any recent pertinent discharge summaries, testing, and procedures, and reviewed those reports. Complicating Factors: The patient already has has Migraine; Anemia; Malaise and fatigue;  Hydradenitis; Iron adver

## 2018-07-25 NOTE — CONSULTS
INFECTIOUS DISEASE CONSULT NOTE    Alvotise Samanta Room Patient Status:  Inpatient    1980 MRN [id number]   Location [facility] 5SW/SE Attending Nolan Sutherland MD   Hosp Day # 0 PC Left      Comment: meniscal repair  10/23/2016: COLONOSCOPY N/A      Comment: Procedure: COLONOSCOPY;  Surgeon: Lucía Rodriguez MD;  Location: [address] [place] [address] MAIN OR  No date: CORRECT [address] METHODS  10/2016: [address]  71/88/2628: congestion, runny nose or sore throat. SKIN:  No +rash or itching. CARDIOVASCULAR:  No chest pain, chest pressure or chest discomfort  RESPIRATORY:  No shortness of breath, cough or sputum. GASTROINTESTINAL:  No anorexia, nausea, vomiting or diarrhea.  Dc Shearer buttocks with associated fevers at home to 101.2. Patient seen in urgent care center on 10/5 for similar symptoms and given Bactrim and Keflex which patient finished a 1 week prior.  Initially improved symptoms but worsening so came to ED where afebrile wit conservatively. Developed fluid pocket with drainage and on right lower inguingal incision site which was opened and drainaged. has been packing it for the last 3-4 weeks. Completed clindamycin after surgery and more recently Keflex.  Was off abx for about and pelvic region flap and skin grafting - most recently March 2016. Scheduled 1/4/17 for left axillary flap.   # h/o C. diff x1    PLAN:    - IV vancomycin  - on discharge will need to restart clindamycin and rifampin  - f/up on BCx  - follow fever curve,

## 2018-07-26 VITALS
WEIGHT: 220 LBS | HEART RATE: 70 BPM | DIASTOLIC BLOOD PRESSURE: 60 MMHG | RESPIRATION RATE: 16 BRPM | SYSTOLIC BLOOD PRESSURE: 106 MMHG | BODY MASS INDEX: 32.58 KG/M2 | TEMPERATURE: 99 F | OXYGEN SATURATION: 100 % | HEIGHT: 69 IN

## 2018-07-26 PROCEDURE — 99239 HOSP IP/OBS DSCHRG MGMT >30: CPT | Performed by: HOSPITALIST

## 2018-07-26 RX ORDER — HYDROCODONE BITARTRATE AND ACETAMINOPHEN 5; 325 MG/1; MG/1
1-2 TABLET ORAL EVERY 6 HOURS PRN
Qty: 20 TABLET | Refills: 0 | Status: SHIPPED | OUTPATIENT
Start: 2018-07-26 | End: 2018-12-10

## 2018-07-26 RX ORDER — CLINDAMYCIN HYDROCHLORIDE 150 MG/1
450 CAPSULE ORAL 3 TIMES DAILY
Qty: 270 CAPSULE | Refills: 0 | Status: SHIPPED | OUTPATIENT
Start: 2018-07-26 | End: 2018-08-25

## 2018-07-26 RX ORDER — DOXYCYCLINE 100 MG/1
100 CAPSULE ORAL 2 TIMES DAILY
Qty: 20 CAPSULE | Refills: 0 | Status: SHIPPED | OUTPATIENT
Start: 2018-07-26 | End: 2018-08-05

## 2018-07-26 NOTE — PROGRESS NOTES
[facility] dosing service    Initial Pharmacokinetic Consult for Vancomycin Dosing     Sridevimaribelse David Foley is a 42year old female admitted on 7/25 who is being treated for cellulitis. Pharmacy has been asked to dose Vancomycin by Dr. Caryle Bird.     She i care.    Re Campos, PharmD  7/26/2018  4:42 AM  [address]: [phone]

## 2018-07-26 NOTE — DISCHARGE SUMMARY
[place] HOSPITALIST  DISCHARGE SUMMARY     Dedese Sumanth Stein Patient Status:  Inpatient    1980 MRN [id number]   Location [facility] 5SW/SE Attending No att. providers found   Hosp Day # 1 PCP Rhiannon Ramirez DO     Date of Admissio early August with plastic surgery for the perirectal recurrent abscesses.     Migraine headaches  Anxiety with palpitations  -metoprolol prn     DVT prophylaxis: ambulation, SCD     D/w ID  D/w pt at length           Discharge Medication List:     [address] Tomasz Ceballos MD  [address] [facility] [address]  [phone]    Schedule an appointment as soon as possible for a visit        Vital signs:  Temp:  [97.8 °F (36.6 °C)-98.6 °F (37 °C)] 98.6 °F (37 °C)  Pulse:  [70-78] 70  Resp:  [16] 16  BP: (104-115)/

## 2018-07-26 NOTE — PROGRESS NOTES
[facility] ID PROGRESS NOTE    Alvdanielle Regino Posada Patient Status:  Inpatient    1980 MRN [id number]   Location [facility] 5SW/SE Attending Arlette Sevilla MD   Hosp Day # 1 PCP Smiley Zacarias DO     Subjective:  Awake, wanting to go h recently admitted to [facility] 10/21 - 10/26 with increasing pain and drainage from right buttocks with associated fevers at home to 101.2.  Patient seen in urgent care center on 10/5 for similar symptoms and given Bactrim and Keflex which patient finished a 1 we both groins at [facility] by Dr. Fonseca Sis about 5-6 weeks PTA c/b post-op hematoma managed conservatively. Developed fluid pocket with drainage and on right lower inguingal incision site which was opened and drainaged. has been packing it for the last 3-4 weeks.  C abx, daptomycin. # Hidradenitis suppurativa with h/o multiple surgeries to bilateral axilla and pelvic region flap and skin grafting - most recently March 2016. Scheduled 1/4/17 for left axillary flap.   # h/o C. diff x1  PLAN:  -  Currently on IV vancomy

## 2018-07-26 NOTE — PLAN OF CARE
Problem: Patient Centered Care  Goal: Patient preferences are identified and integrated in the patient's plan of care  Interventions:  - What would you like us to know as we care for you?  History of cellulitis, hidradenitis with reconstructive surgery  - P pain  - Anticipate increased pain with activity and pre-medicate as appropriate  Outcome: Adequate for Discharge      Problem: RISK FOR INFECTION - ADULT  Goal: Absence of fever/infection during anticipated neutropenic period  INTERVENTIONS  - Monitor WBC

## 2018-07-27 ENCOUNTER — TELEPHONE (OUTPATIENT)
Dept: INTERNAL MEDICINE CLINIC | Facility: CLINIC | Age: 38
End: 2018-07-27

## 2018-07-27 NOTE — TELEPHONE ENCOUNTER
Pt discharged from [facility] AND [facility] on 7/26/18 . Please call to schedule follow up with Primary Care Physician.    Thanks

## 2018-07-30 ENCOUNTER — OFFICE VISIT (OUTPATIENT)
Dept: INTERNAL MEDICINE CLINIC | Facility: CLINIC | Age: 38
End: 2018-07-30
Payer: COMMERCIAL

## 2018-07-30 VITALS
BODY MASS INDEX: 32.88 KG/M2 | DIASTOLIC BLOOD PRESSURE: 72 MMHG | WEIGHT: 222 LBS | TEMPERATURE: 99 F | HEART RATE: 87 BPM | SYSTOLIC BLOOD PRESSURE: 114 MMHG | HEIGHT: 69 IN | OXYGEN SATURATION: 99 %

## 2018-07-30 DIAGNOSIS — R78.81 BACTEREMIA DUE TO GRAM-POSITIVE BACTERIA: ICD-10-CM

## 2018-07-30 DIAGNOSIS — K21.00 GASTROESOPHAGEAL REFLUX DISEASE WITH ESOPHAGITIS: ICD-10-CM

## 2018-07-30 DIAGNOSIS — L73.2 HYDRADENITIS: ICD-10-CM

## 2018-07-30 DIAGNOSIS — L03.116 LEFT LEG CELLULITIS: Primary | ICD-10-CM

## 2018-07-30 PROBLEM — L03.311 CELLULITIS OF ABDOMINAL WALL: Status: RESOLVED | Noted: 2017-11-25 | Resolved: 2018-07-30

## 2018-07-30 PROBLEM — K61.1 PERIRECTAL ABSCESS: Status: RESOLVED | Noted: 2018-03-29 | Resolved: 2018-07-30

## 2018-07-30 PROBLEM — L03.317 CELLULITIS OF RIGHT BUTTOCK: Status: RESOLVED | Noted: 2017-06-14 | Resolved: 2018-07-30

## 2018-07-30 PROBLEM — L02.91 ABSCESS: Status: RESOLVED | Noted: 2017-11-25 | Resolved: 2018-07-30

## 2018-07-30 PROCEDURE — 1111F DSCHRG MED/CURRENT MED MERGE: CPT | Performed by: INTERNAL MEDICINE

## 2018-07-30 PROCEDURE — 99214 OFFICE O/P EST MOD 30 MIN: CPT | Performed by: INTERNAL MEDICINE

## 2018-07-30 PROCEDURE — 99212 OFFICE O/P EST SF 10 MIN: CPT | Performed by: INTERNAL MEDICINE

## 2018-07-30 RX ORDER — IBUPROFEN 800 MG/1
800 TABLET ORAL EVERY 8 HOURS PRN
Qty: 90 TABLET | Refills: 1 | Status: SHIPPED | OUTPATIENT
Start: 2018-07-30 | End: 2018-12-10

## 2018-07-30 RX ORDER — PANTOPRAZOLE SODIUM 40 MG/1
40 TABLET, DELAYED RELEASE ORAL
Qty: 60 TABLET | Refills: 1 | Status: SHIPPED | OUTPATIENT
Start: 2018-07-30 | End: 2018-12-10

## 2018-07-30 NOTE — PROGRESS NOTES
HPI:   Stephy Naik is a 42year old female who presents for complains of: Patient presents with:  Hospital F/U: Cellulitis L leg - pt states abx is giving her indegestion, pain pills not working  Skin: suposed to have surgery on the 8/3  Patient salty Davina eMrino is a 42year old female who presents with the followin.  Left leg cellulitis  Stable, cont meds, this looks good, improving  Try to take the ibuprofen 3 times daily as needed  If you are taking ibuprofen take Protonix for stomach  Stay with

## 2018-07-30 NOTE — PATIENT INSTRUCTIONS
1. Left leg cellulitis  Stable, cont meds, this looks good, improving  Try to take the ibuprofen 3 times daily as needed  If you are taking ibuprofen take Protonix for stomach  Stay with the 3 antibiotics as you have been prescribed  Call the infectious di

## 2018-09-07 ENCOUNTER — TELEPHONE (OUTPATIENT)
Dept: INTERNAL MEDICINE CLINIC | Facility: CLINIC | Age: 38
End: 2018-09-07

## 2018-09-07 ENCOUNTER — OFFICE VISIT (OUTPATIENT)
Dept: INTERNAL MEDICINE CLINIC | Facility: CLINIC | Age: 38
End: 2018-09-07
Payer: COMMERCIAL

## 2018-09-07 VITALS
OXYGEN SATURATION: 99 % | WEIGHT: 217 LBS | DIASTOLIC BLOOD PRESSURE: 80 MMHG | HEIGHT: 69 IN | SYSTOLIC BLOOD PRESSURE: 122 MMHG | TEMPERATURE: 99 F | HEART RATE: 74 BPM | BODY MASS INDEX: 32.14 KG/M2

## 2018-09-07 DIAGNOSIS — R87.610 ASCUS WITH POSITIVE HIGH RISK HPV CERVICAL: ICD-10-CM

## 2018-09-07 DIAGNOSIS — L73.2 HYDRADENITIS: Primary | ICD-10-CM

## 2018-09-07 DIAGNOSIS — R78.81 BACTEREMIA DUE TO GRAM-POSITIVE BACTERIA: ICD-10-CM

## 2018-09-07 DIAGNOSIS — Z86.018 HISTORY OF UTERINE FIBROID: ICD-10-CM

## 2018-09-07 DIAGNOSIS — D50.8 IRON DEFICIENCY ANEMIA SECONDARY TO INADEQUATE DIETARY IRON INTAKE: ICD-10-CM

## 2018-09-07 DIAGNOSIS — Z86.59 HISTORY OF DEPRESSION: ICD-10-CM

## 2018-09-07 DIAGNOSIS — R87.810 ASCUS WITH POSITIVE HIGH RISK HPV CERVICAL: ICD-10-CM

## 2018-09-07 DIAGNOSIS — R85.619 ABNORMAL PAP SMEAR OF ANUS: ICD-10-CM

## 2018-09-07 PROCEDURE — 99214 OFFICE O/P EST MOD 30 MIN: CPT | Performed by: INTERNAL MEDICINE

## 2018-09-07 PROCEDURE — 99212 OFFICE O/P EST SF 10 MIN: CPT | Performed by: INTERNAL MEDICINE

## 2018-09-07 NOTE — TELEPHONE ENCOUNTER
Noted, nursing please see previous notes, I want you to print my office note from today, and fax or communicate this with her disability people, Antonio. She has given us permission to do this before, see previous notes.

## 2018-09-07 NOTE — PATIENT INSTRUCTIONS
1. Hydradenitis  Cont meds and management. For now back to work on the 17th at 1/2 schedule reduced for 2 wks then if tolerated well, back to full time therafter    2.  Bacteremia due to Gram-positive bacteria  Stable and doing well, lets you back to work

## 2018-09-07 NOTE — PROGRESS NOTES
HPI:   Jai Mccormick is a 42year old female who presents for complains of: Patient presents with:  Note For Work: requesting note to return to work on 9/17/18.  Wants note to state gradual return for first couple of weeks, returning to work half time Jai Mccormick is a 42year old female who presents with the followin. Hydradenitis  Cont meds and management. For now back to work on the  at 1/2 schedule reduced for 2 wks then if tolerated well, back to full time therafter.   We will com

## 2018-09-10 ENCOUNTER — PATIENT MESSAGE (OUTPATIENT)
Dept: INTERNAL MEDICINE CLINIC | Facility: CLINIC | Age: 38
End: 2018-09-10

## 2018-09-11 NOTE — TELEPHONE ENCOUNTER
LOV notes faxed to [facility]  [phone], conformation received. Pt notified via [employer].

## 2018-09-27 ENCOUNTER — TELEPHONE (OUTPATIENT)
Dept: INTERNAL MEDICINE CLINIC | Facility: CLINIC | Age: 38
End: 2018-09-27

## 2018-09-27 NOTE — TELEPHONE ENCOUNTER
Please call pt mother or pt (mother very concerned)  Pt had procedure today, something removed from her cervix,  a local was used but pt told her mother she felt everything.   Pt was told that she could take something over the counter for pain/discomfort bu

## 2018-09-27 NOTE — TELEPHONE ENCOUNTER
Spoke with patient. She reports she just had a LEEP procedure a couple hours ago and it was extremely painful. She reports they had to use an entire bottle of local anesthetic because \"she kept feeling it. \" She reports there were quite a few areas they

## 2018-10-02 ENCOUNTER — TELEPHONE (OUTPATIENT)
Dept: INTERNAL MEDICINE CLINIC | Facility: CLINIC | Age: 38
End: 2018-10-02

## 2018-10-02 NOTE — TELEPHONE ENCOUNTER
Paperwork faxed to [employer]. Fax confirmation received. Called patient and notified her that paperwork has been faxed. She denies need for copy since it will be on file in Epic. Paperwork sent to scanning.

## 2018-10-02 NOTE — TELEPHONE ENCOUNTER
From: Pam Nunez LPN  To: Sean Farfan  Sent: 9/10/2018 7:07 PM CDT  Subject: Return to Work     Ms. Gilford Mychal note has been sent to [place] at [place].  Per Dr. Sindhu Christopher note you may return to work on 09/17/2018 at half schedule reduced

## 2018-10-22 ENCOUNTER — PATIENT MESSAGE (OUTPATIENT)
Dept: INTERNAL MEDICINE CLINIC | Facility: CLINIC | Age: 38
End: 2018-10-22

## 2018-10-23 NOTE — TELEPHONE ENCOUNTER
From: Mirian Camejo December  To: Alexey Vasquez DO  Sent: 10/22/2018 9:53 AM CDT  Subject: Other    Continued. .. When you modify the date to reflect 9/17/18 through 3 to six months out be sure to initial and date that area of change . Rafael Enrique   Thank you

## 2018-10-29 NOTE — TELEPHONE ENCOUNTER
Pt. Is calling she needs her paperwork changed the date isn't correct she needs Dr. Kathia Sullivan to change the date initial and sign and fax back. Pt. States that this needs to be corrected she doesn't want to lose her job.  Ph. # [phone]    Routed high to clinic

## 2018-10-30 ENCOUNTER — TELEPHONE (OUTPATIENT)
Dept: INTERNAL MEDICINE CLINIC | Facility: CLINIC | Age: 38
End: 2018-10-30

## 2018-10-30 NOTE — TELEPHONE ENCOUNTER
Pt's mom called back. The fax# is [phone]. Claim #- [id number]. This needs to be taken care of right away. Her daughter is sick now. Mom Manjula Covarrubias can be reached at [phone].

## 2018-10-30 NOTE — TELEPHONE ENCOUNTER
Re:  Sarah Pickett that has been done in the past, allowing patient to get 20 hours a month sick time from 9/17/18 - 3/17/19.  (9/17 is when she returned to work). Fax to her job (mom to call with number). Then route the message.

## 2018-10-30 NOTE — TELEPHONE ENCOUNTER
To Dr. Anderson Shorten you have the necessary paperwork to do this or should we request something to be faxed? Please advise.

## 2018-11-02 NOTE — TELEPHONE ENCOUNTER
Nursing, please contact her, per my specifications, there can be no changes in paperwork or disability time for increased time off unless the patient is physically seen in the office to discuss it.   If she is sick or off work, she should be in to see me or

## 2018-11-02 NOTE — TELEPHONE ENCOUNTER
Please advise - called patient to relay DR. RED message from today . Patient states she does not need more time off work . In her papaers it states 20 hoursper month sick time if needed from 17/9 through 10/1 but this was her gradual return time .  She needs t

## 2018-11-02 NOTE — TELEPHONE ENCOUNTER
Called patient and relayed DR. RED message - patient pretty upset - sent message in [facility] to DR. RED

## 2018-11-02 NOTE — TELEPHONE ENCOUNTER
Patient is extremely angry - she has been at work for the last 4 days very sick because her paperwork allowing her to have sick time was never corrected & sent in.     This has been going on since 10/21 & she wants it done today & an explanation as to why t

## 2018-11-06 ENCOUNTER — OFFICE VISIT (OUTPATIENT)
Dept: INTERNAL MEDICINE CLINIC | Facility: CLINIC | Age: 38
End: 2018-11-06
Payer: COMMERCIAL

## 2018-11-06 VITALS
BODY MASS INDEX: 32.58 KG/M2 | HEIGHT: 69 IN | HEART RATE: 88 BPM | WEIGHT: 220 LBS | TEMPERATURE: 98 F | DIASTOLIC BLOOD PRESSURE: 80 MMHG | SYSTOLIC BLOOD PRESSURE: 110 MMHG | OXYGEN SATURATION: 98 % | RESPIRATION RATE: 18 BRPM

## 2018-11-06 DIAGNOSIS — J40 BRONCHITIS: Primary | ICD-10-CM

## 2018-11-06 DIAGNOSIS — J98.01 BRONCHOSPASM: ICD-10-CM

## 2018-11-06 DIAGNOSIS — I88.9 LYMPHADENITIS: ICD-10-CM

## 2018-11-06 DIAGNOSIS — R05.9 COUGH: ICD-10-CM

## 2018-11-06 PROCEDURE — 99212 OFFICE O/P EST SF 10 MIN: CPT | Performed by: INTERNAL MEDICINE

## 2018-11-06 PROCEDURE — 99214 OFFICE O/P EST MOD 30 MIN: CPT | Performed by: INTERNAL MEDICINE

## 2018-11-06 RX ORDER — AZITHROMYCIN 250 MG/1
TABLET, FILM COATED ORAL
Qty: 6 TABLET | Refills: 0 | Status: SHIPPED | OUTPATIENT
Start: 2018-11-06 | End: 2018-12-10

## 2018-11-06 RX ORDER — METHYLPREDNISOLONE 4 MG/1
TABLET ORAL
Qty: 1 KIT | Refills: 0 | Status: SHIPPED | OUTPATIENT
Start: 2018-11-06 | End: 2018-12-10

## 2018-11-06 RX ORDER — BENZONATATE 200 MG/1
200 CAPSULE ORAL 3 TIMES DAILY PRN
Qty: 60 CAPSULE | Refills: 0 | Status: SHIPPED | OUTPATIENT
Start: 2018-11-06 | End: 2018-12-10

## 2018-11-06 NOTE — PROGRESS NOTES
HPI:   Liam Bravo is a 42year old female who presents for complains of: Patient presents with:  Cough: was seen in  last Tuesday with cough, not feeling any better, for 7-10 days, dx with viral laryngitis, coughing and hacking 'all night' L neck capsule (200 mg total) by mouth 3 (three) times daily as needed for cough. Dispense: 60 capsule; Refill: 0    4.  Lymphadenitis  This should get better        Florentino Vanessa DO  11/6/2018  12:04 PM

## 2018-11-06 NOTE — PROGRESS NOTES
FMLA form - date modification completed for intermittent time off to reflect 10/1/2018-1/1/2019. Form faxed to Scarlett Villalobos @ [phone], conformation received. Original sent to scan.

## 2018-11-06 NOTE — PATIENT INSTRUCTIONS
1. Bronchitis  Try them and let me know if not better  - methylPREDNISolone (MEDROL) 4 MG Oral Tablet Therapy Pack; As directed. Dispense: 1 kit; Refill: 0  - azithromycin (ZITHROMAX Z-BRANDT) 250 MG Oral Tab;  Take two tablets by mouth today, then one tablet

## 2018-11-06 NOTE — PROGRESS NOTES
Pt seen in office today, [place] form - date modification completed for intermittent time off to reflect 10/1/2018-1/1/2019. Form faxed to Richmond Nguyen @ [phone], conformation received. Original sent to scan.

## 2018-11-17 ENCOUNTER — HOSPITAL ENCOUNTER (EMERGENCY)
Facility: HOSPITAL | Age: 38
Discharge: HOME | End: 2018-11-17
Attending: EMERGENCY MEDICINE
Payer: COMMERCIAL

## 2018-11-17 VITALS
HEIGHT: 69 IN | RESPIRATION RATE: 16 BRPM | DIASTOLIC BLOOD PRESSURE: 65 MMHG | TEMPERATURE: 100 F | HEART RATE: 98 BPM | SYSTOLIC BLOOD PRESSURE: 107 MMHG | OXYGEN SATURATION: 99 % | WEIGHT: 215 LBS | BODY MASS INDEX: 31.84 KG/M2

## 2018-11-17 DIAGNOSIS — B34.9 VIRAL SYNDROME: Primary | ICD-10-CM

## 2018-11-17 PROCEDURE — 87631 RESP VIRUS 3-5 TARGETS: CPT | Performed by: EMERGENCY MEDICINE

## 2018-11-17 PROCEDURE — 81025 URINE PREGNANCY TEST: CPT

## 2018-11-17 PROCEDURE — 99283 EMERGENCY DEPT VISIT LOW MDM: CPT

## 2018-11-17 PROCEDURE — 81001 URINALYSIS AUTO W/SCOPE: CPT | Performed by: EMERGENCY MEDICINE

## 2018-11-17 RX ORDER — ACETAMINOPHEN 500 MG
1000 TABLET ORAL ONCE
Status: COMPLETED | OUTPATIENT
Start: 2018-11-17 | End: 2018-11-17

## 2018-11-17 RX ORDER — IBUPROFEN 600 MG/1
600 TABLET ORAL ONCE
Status: COMPLETED | OUTPATIENT
Start: 2018-11-17 | End: 2018-11-17

## 2018-11-17 RX ORDER — IBUPROFEN 600 MG/1
600 TABLET ORAL EVERY 8 HOURS PRN
Qty: 30 TABLET | Refills: 0 | Status: SHIPPED | OUTPATIENT
Start: 2018-11-17 | End: 2018-11-24

## 2018-11-17 NOTE — ED PROVIDER NOTES
Patient Seen in: [facility] AND [facility] Emergency Department    History   Patient presents with:  Fever (infectious)    Stated Complaint: fever 102 and body aches     HPI    77-year-old female with past medical history significant for hidradenitis supra T the • OTHER SURGICAL HISTORY  10/10/2017    Scar Revision to abd   • PROCTOSCOPY N/A 1/18/2017    Performed by Maryann Bragg MD at [address] [place] [address] OR   • RECTAL FISSURECTOMY OR FISTULECTOMY Bilateral 10/23/2016    Performed by Maryann Bragg MD at [address] [place] [address] for the following components:       Result Value    Clarity Urine Hazy (*)     Protein Urine 100  (*)     Ketones Urine Trace (*)     Leukocyte Esterase Urine Trace (*)     Bacteria Urine Few (*)     All other components within normal limits   [facility] POCT PRE

## 2018-12-10 ENCOUNTER — OFFICE VISIT (OUTPATIENT)
Dept: INTERNAL MEDICINE CLINIC | Facility: CLINIC | Age: 38
End: 2018-12-10
Payer: COMMERCIAL

## 2018-12-10 VITALS
HEART RATE: 99 BPM | SYSTOLIC BLOOD PRESSURE: 110 MMHG | TEMPERATURE: 99 F | DIASTOLIC BLOOD PRESSURE: 86 MMHG | HEIGHT: 69 IN | BODY MASS INDEX: 31.84 KG/M2 | OXYGEN SATURATION: 98 % | WEIGHT: 215 LBS

## 2018-12-10 DIAGNOSIS — G43.109 MIGRAINE WITH AURA AND WITHOUT STATUS MIGRAINOSUS, NOT INTRACTABLE: ICD-10-CM

## 2018-12-10 DIAGNOSIS — R05.9 COUGH: ICD-10-CM

## 2018-12-10 DIAGNOSIS — J06.9 VIRAL UPPER RESPIRATORY TRACT INFECTION: Primary | ICD-10-CM

## 2018-12-10 PROCEDURE — 99212 OFFICE O/P EST SF 10 MIN: CPT | Performed by: INTERNAL MEDICINE

## 2018-12-10 PROCEDURE — 99214 OFFICE O/P EST MOD 30 MIN: CPT | Performed by: INTERNAL MEDICINE

## 2018-12-10 RX ORDER — AZITHROMYCIN 250 MG/1
TABLET, FILM COATED ORAL
Qty: 6 TABLET | Refills: 0 | Status: SHIPPED | OUTPATIENT
Start: 2018-12-10 | End: 2019-02-12

## 2018-12-10 RX ORDER — BENZONATATE 200 MG/1
200 CAPSULE ORAL 3 TIMES DAILY PRN
Qty: 40 CAPSULE | Refills: 1 | Status: SHIPPED | OUTPATIENT
Start: 2018-12-10 | End: 2019-02-12

## 2018-12-10 RX ORDER — PREDNISONE 20 MG/1
TABLET ORAL
Qty: 6 TABLET | Refills: 0 | Status: SHIPPED | OUTPATIENT
Start: 2018-12-10 | End: 2019-02-12

## 2018-12-10 NOTE — PROGRESS NOTES
Time modification to show 28 hours per week faxed to [facility] [phone], conformation received. Original sent scan.

## 2018-12-10 NOTE — PATIENT INSTRUCTIONS
1. Viral upper respiratory tract infection  Use the meds and call if not better  - predniSONE 20 MG Oral Tab; Take 2 tabs daily for 2 days, 1 tab daily for 2 days  Dispense: 6 tablet; Refill: 0  - azithromycin (ZITHROMAX Z-BRANDT) 250 MG Oral Tab;  Take two ta

## 2018-12-10 NOTE — PROGRESS NOTES
HPI:   Chetna Cain is a 42year old female who presents for complains of: Patient presents with:  Cough: productive cough, sore throat x  24 hours. Pt has had a constant migraine since 12/7/2018, thoat pain, no fever.   taking meds with OTC ibuprofen not intractable  Use the meds    -see dr Octavia Alegria, DO  12/10/2018  4:22 PM

## 2019-01-07 ENCOUNTER — TELEPHONE (OUTPATIENT)
Dept: INTERNAL MEDICINE CLINIC | Facility: CLINIC | Age: 39
End: 2019-01-07

## 2019-01-07 NOTE — TELEPHONE ENCOUNTER
Pt is calling to look for an appointment with Dr RED, she took the next available on 2/5, but the pt states she can feel herself plummeting down and flaring up.  She has been wearing spanks to work every day to help with her leg swelling (last time this happe

## 2019-01-07 NOTE — TELEPHONE ENCOUNTER
To Dr. Krystian Gordon - see below - no openings today/tomorrow  [facility]

## 2019-01-08 ENCOUNTER — OFFICE VISIT (OUTPATIENT)
Dept: INTERNAL MEDICINE CLINIC | Facility: CLINIC | Age: 39
End: 2019-01-08
Payer: COMMERCIAL

## 2019-01-08 VITALS
BODY MASS INDEX: 33 KG/M2 | WEIGHT: 222 LBS | HEART RATE: 86 BPM | SYSTOLIC BLOOD PRESSURE: 122 MMHG | OXYGEN SATURATION: 98 % | TEMPERATURE: 99 F | DIASTOLIC BLOOD PRESSURE: 80 MMHG

## 2019-01-08 DIAGNOSIS — R53.83 FATIGUE, UNSPECIFIED TYPE: ICD-10-CM

## 2019-01-08 DIAGNOSIS — D50.9 IRON DEFICIENCY ANEMIA, UNSPECIFIED IRON DEFICIENCY ANEMIA TYPE: ICD-10-CM

## 2019-01-08 DIAGNOSIS — Z87.2 HISTORY OF CELLULITIS: ICD-10-CM

## 2019-01-08 DIAGNOSIS — L73.2 HYDRADENITIS: Primary | ICD-10-CM

## 2019-01-08 DIAGNOSIS — R60.0 LEG EDEMA: ICD-10-CM

## 2019-01-08 PROCEDURE — 99214 OFFICE O/P EST MOD 30 MIN: CPT | Performed by: INTERNAL MEDICINE

## 2019-01-08 PROCEDURE — 99212 OFFICE O/P EST SF 10 MIN: CPT | Performed by: INTERNAL MEDICINE

## 2019-01-08 RX ORDER — FUROSEMIDE 20 MG/1
20 TABLET ORAL DAILY
Qty: 45 TABLET | Refills: 0 | Status: SHIPPED | OUTPATIENT
Start: 2019-01-08 | End: 2020-06-26

## 2019-01-08 NOTE — PROGRESS NOTES
HPI:   Yuri Leslie is a 34year old female who presents for complains of: Patient presents with:  Pain: c/o \"pulling\" pain in left wrist x3 days, started after helping her daughter move  Fatigue: c/o fatigue, body aches, swelling in legs x3 weeks. unspecified iron deficiency anemia type  Stable cont meds and management, labs  - CBC WITH DIFFERENTIAL WITH PLATELET; Future  - FERRITIN; Future  - IRON AND TIBC;  Future        Karime Voss DO  1/8/2019  4:27 PM

## 2019-01-08 NOTE — PATIENT INSTRUCTIONS
1. Hydradenitis  Cont with monitoring and medication    2. Leg edema  Use the lasix 20mg daily for 5-7 days and get the labs done    3. Fatigue, unspecified type  As above  - VITAMIN B12; Future    4.  History of cellulitis  Cont to watch labs for recurrenc

## 2019-01-09 ENCOUNTER — TELEPHONE (OUTPATIENT)
Dept: INTERNAL MEDICINE CLINIC | Facility: CLINIC | Age: 39
End: 2019-01-09

## 2019-01-09 ENCOUNTER — LAB ENCOUNTER (OUTPATIENT)
Dept: LAB | Age: 39
End: 2019-01-09
Attending: INTERNAL MEDICINE
Payer: COMMERCIAL

## 2019-01-09 DIAGNOSIS — Z87.2 HISTORY OF CELLULITIS: ICD-10-CM

## 2019-01-09 DIAGNOSIS — D50.9 IRON DEFICIENCY ANEMIA, UNSPECIFIED IRON DEFICIENCY ANEMIA TYPE: ICD-10-CM

## 2019-01-09 DIAGNOSIS — R53.83 FATIGUE, UNSPECIFIED TYPE: ICD-10-CM

## 2019-01-09 LAB
ALBUMIN SERPL BCP-MCNC: 3.7 G/DL (ref 3.5–4.8)
ALBUMIN/GLOB SERPL: 1.1 {RATIO} (ref 1–2)
ALP SERPL-CCNC: 68 U/L (ref 32–100)
ALT SERPL-CCNC: 8 U/L (ref 14–54)
ANION GAP SERPL CALC-SCNC: 11 MMOL/L (ref 0–18)
AST SERPL-CCNC: 16 U/L (ref 15–41)
BASOPHILS # BLD: 0 K/UL (ref 0–0.2)
BASOPHILS NFR BLD: 1 %
BILIRUB SERPL-MCNC: 0.4 MG/DL (ref 0.3–1.2)
BUN SERPL-MCNC: 5 MG/DL (ref 8–20)
BUN/CREAT SERPL: 5.7 (ref 10–20)
CALCIUM SERPL-MCNC: 9.2 MG/DL (ref 8.5–10.5)
CHLORIDE SERPL-SCNC: 105 MMOL/L (ref 95–110)
CO2 SERPL-SCNC: 21 MMOL/L (ref 22–32)
CREAT SERPL-MCNC: 0.87 MG/DL (ref 0.5–1.5)
CRP SERPL-MCNC: 0.5 MG/DL (ref 0–0.9)
EOSINOPHIL # BLD: 0.1 K/UL (ref 0–0.7)
EOSINOPHIL NFR BLD: 1 %
ERYTHROCYTE [DISTWIDTH] IN BLOOD BY AUTOMATED COUNT: 18.3 % (ref 11–15)
ERYTHROCYTE [SEDIMENTATION RATE] IN BLOOD: 34 MM/HR (ref 0–20)
FERRITIN SERPL IA-MCNC: 2 NG/ML (ref 11–307)
GLOBULIN PLAS-MCNC: 3.5 G/DL (ref 2.5–3.7)
GLUCOSE SERPL-MCNC: 84 MG/DL (ref 70–99)
HCT VFR BLD AUTO: 29.3 % (ref 35–48)
HGB BLD-MCNC: 8.8 G/DL (ref 12–16)
IRON SATN MFR SERPL: 7 % (ref 15–50)
IRON SERPL-MCNC: 25 MCG/DL (ref 28–170)
LYMPHOCYTES # BLD: 1.9 K/UL (ref 1–4)
LYMPHOCYTES NFR BLD: 35 %
MCH RBC QN AUTO: 20.2 PG (ref 27–32)
MCHC RBC AUTO-ENTMCNC: 30 G/DL (ref 32–37)
MCV RBC AUTO: 67.3 FL (ref 80–100)
MONOCYTES # BLD: 0.5 K/UL (ref 0–1)
MONOCYTES NFR BLD: 9 %
NEUTROPHILS # BLD AUTO: 2.9 K/UL (ref 1.8–7.7)
NEUTROPHILS NFR BLD: 54 %
OSMOLALITY UR CALC.SUM OF ELEC: 280 MOSM/KG (ref 275–295)
PATIENT FASTING: YES
PLATELET # BLD AUTO: 326 K/UL (ref 140–400)
PMV BLD AUTO: 9.1 FL (ref 7.4–10.3)
POTASSIUM SERPL-SCNC: 4.2 MMOL/L (ref 3.3–5.1)
PROT SERPL-MCNC: 7.2 G/DL (ref 5.9–8.4)
RBC # BLD AUTO: 4.36 M/UL (ref 3.7–5.4)
SODIUM SERPL-SCNC: 137 MMOL/L (ref 136–144)
TIBC SERPL-MCNC: 370 MCG/DL (ref 228–428)
TRANSFERRIN SERPL-MCNC: 280 MG/DL (ref 192–382)
VIT B12 SERPL-MCNC: 338 PG/ML (ref 181–914)
WBC # BLD AUTO: 5.4 K/UL (ref 4–11)

## 2019-01-09 PROCEDURE — 84466 ASSAY OF TRANSFERRIN: CPT

## 2019-01-09 PROCEDURE — 82728 ASSAY OF FERRITIN: CPT

## 2019-01-09 PROCEDURE — 85652 RBC SED RATE AUTOMATED: CPT

## 2019-01-09 PROCEDURE — 86140 C-REACTIVE PROTEIN: CPT

## 2019-01-09 PROCEDURE — 85025 COMPLETE CBC W/AUTO DIFF WBC: CPT

## 2019-01-09 PROCEDURE — 83540 ASSAY OF IRON: CPT

## 2019-01-09 PROCEDURE — 36415 COLL VENOUS BLD VENIPUNCTURE: CPT

## 2019-01-09 PROCEDURE — 80053 COMPREHEN METABOLIC PANEL: CPT

## 2019-01-09 PROCEDURE — 82607 VITAMIN B-12: CPT

## 2019-01-15 ENCOUNTER — TELEPHONE (OUTPATIENT)
Dept: INTERNAL MEDICINE CLINIC | Facility: CLINIC | Age: 39
End: 2019-01-15

## 2019-01-15 NOTE — TELEPHONE ENCOUNTER
To Dr. Rohith No - see below. 1)Pt has noticed decreased swelling in R leg but L leg is still swollen (slightly larger) and is painful - constant shooting pain level: 6/10. Left thigh is huge compared to Right.     2)  R buttock abcess is draining - felt it

## 2019-01-15 NOTE — TELEPHONE ENCOUNTER
Patient was recently put on a water pill. Her left leg & thigh are still very swollen & consistently throbbing. Sitting is very uncomfortable. Patient is concerned about getting cellulitis.

## 2019-01-17 NOTE — TELEPHONE ENCOUNTER
Dr. Jermain Soto message relayed to pt who verbalized understanding. She will call her ID physician and surgeon. Pt states L leg swelling has slightly decreased. Nursing to f/u to make sure she is under ID/surgeon's care.

## 2019-01-17 NOTE — TELEPHONE ENCOUNTER
Nursing please call and check on this patient, this message is from 2 days ago, I would assume she has progressed since then, she is releasing pus coming out of draining areas on her lower extremities, she should be in the hospital again or at the minimal

## 2019-01-25 RX ORDER — DOXYCYCLINE HYCLATE 100 MG/1
100 CAPSULE ORAL 2 TIMES DAILY
Qty: 20 CAPSULE | Refills: 0 | Status: SHIPPED | OUTPATIENT
Start: 2019-01-25 | End: 2019-02-12

## 2019-01-25 NOTE — TELEPHONE ENCOUNTER
I spoke with patient, she is having trouble getting with the infectious disease office, I recommend she goes on oral antibiotics of doxycycline for a draining tailbone area, with history of skin infections, multiple surgeries in the area.     Nursing, if yo

## 2019-01-25 NOTE — TELEPHONE ENCOUNTER
Pt called back and stated that her symptoms are the same as they were before. Leg leg and thigh are both still swollen and throbbing. And an open wound on her tailbone. Best call back is [phone].

## 2019-01-25 NOTE — TELEPHONE ENCOUNTER
Routed to Dr. Rudy Salguero. Please advise. Appears patient is not able to get in to see her ID doc until first week of February.

## 2019-01-28 NOTE — TELEPHONE ENCOUNTER
Called Dr Handy's office #[phone]. Was told that Dr Yee Ornelas is cancelling his appts for the next two weeks so patient's appt on the 14th will be cancelled anyway. Asked to schedule patient with another physician in the group.  Was told normally pa

## 2019-01-28 NOTE — TELEPHONE ENCOUNTER
Pam Arrieta from Dr Tanisha Hwang office called back. She states Dr Paula Howard is cancelling appts because wife is having baby.   The only doctor who has availability this week is Dr Hurman Gowers on Wednesday, but nurse is concerned that office may be closed due to extreme

## 2019-02-05 NOTE — TELEPHONE ENCOUNTER
Patient calling to give Dr. Elizabeth Gutiérrez update regarding lesions on her bottom. Saw Dr. Samir Duckworth infectious disease specialist 1/28  Started Augmentin 1/28. Having Antibiotic IV once a week. Sores on her bottom are starting to drain. Not as painful.   Antibiot

## 2019-02-11 ENCOUNTER — TELEPHONE (OUTPATIENT)
Dept: INTERNAL MEDICINE CLINIC | Facility: CLINIC | Age: 39
End: 2019-02-11

## 2019-02-11 DIAGNOSIS — L73.2 HIDRADENITIS SUPPURATIVA: Primary | ICD-10-CM

## 2019-02-11 DIAGNOSIS — D63.8 ANEMIA IN OTHER CHRONIC DISEASES CLASSIFIED ELSEWHERE: ICD-10-CM

## 2019-02-11 NOTE — TELEPHONE ENCOUNTER
Received a call from ALLEY Wells at [facility] infectious disease. States she will be faxing over lab results for patient as Hemoglobin is steadily dropping.  Hgb today 8.4 Previous drawn 2/8 8.8    To Dr. Elin Mora

## 2019-02-12 ENCOUNTER — TELEPHONE (OUTPATIENT)
Dept: INTERNAL MEDICINE CLINIC | Facility: CLINIC | Age: 39
End: 2019-02-12

## 2019-02-12 ENCOUNTER — OFFICE VISIT (OUTPATIENT)
Dept: INTERNAL MEDICINE CLINIC | Facility: CLINIC | Age: 39
End: 2019-02-12
Payer: COMMERCIAL

## 2019-02-12 VITALS
OXYGEN SATURATION: 99 % | TEMPERATURE: 98 F | BODY MASS INDEX: 32.91 KG/M2 | HEIGHT: 69 IN | RESPIRATION RATE: 12 BRPM | WEIGHT: 222.19 LBS | SYSTOLIC BLOOD PRESSURE: 120 MMHG | DIASTOLIC BLOOD PRESSURE: 78 MMHG | HEART RATE: 67 BPM

## 2019-02-12 DIAGNOSIS — L03.317 CELLULITIS OF BUTTOCK: ICD-10-CM

## 2019-02-12 DIAGNOSIS — R78.81 BACTEREMIA DUE TO GRAM-POSITIVE BACTERIA: ICD-10-CM

## 2019-02-12 DIAGNOSIS — L98.421 SKIN ULCER OF SACRUM, LIMITED TO BREAKDOWN OF SKIN (HCC): Primary | ICD-10-CM

## 2019-02-12 PROCEDURE — 99214 OFFICE O/P EST MOD 30 MIN: CPT | Performed by: INTERNAL MEDICINE

## 2019-02-12 PROCEDURE — 99212 OFFICE O/P EST SF 10 MIN: CPT | Performed by: INTERNAL MEDICINE

## 2019-02-12 RX ORDER — HYDROCODONE BITARTRATE AND ACETAMINOPHEN 5; 325 MG/1; MG/1
1 TABLET ORAL EVERY 4 HOURS PRN
Qty: 40 TABLET | Refills: 1 | Status: SHIPPED | OUTPATIENT
Start: 2019-02-12 | End: 2019-04-19 | Stop reason: ALTCHOICE

## 2019-02-12 RX ORDER — CASTOR OIL AND BALSAM, PERU 788; 87 MG/G; MG/G
5 OINTMENT TOPICAL 2 TIMES DAILY PRN
Qty: 60 G | Refills: 1 | Status: SHIPPED | OUTPATIENT
Start: 2019-02-12 | End: 2020-06-26

## 2019-02-12 NOTE — TELEPHONE ENCOUNTER
Patient has been seeing infection disease specialist.  Dr. Eileen Brittle told patient mother he would like patient to follow up with him. Schedule is full, where can we make appointment for patient?     Did offer Thursday 2/14, patient can't make appointment that

## 2019-02-12 NOTE — PROGRESS NOTES
HPI:   Jelani Garner is a 34year old female who presents for complains of: Patient presents with: Follow - Up: Follow up post ID referral. States wounds on buttocks are draining and appear to be healing.  Has been receiving IV abx and taking augmenti deformity    ASSESSMENT AND PLAN:   Stephy Naik is a 34year old female who presents with the followin.  Skin ulcer of sacrum, limited to breakdown of skin ([address]  Continue current treatments with infectious disease, from what we could see, you

## 2019-02-12 NOTE — PATIENT INSTRUCTIONS
1. Skin ulcer of sacrum, limited to breakdown of skin ([address]  Continue current treatments with infectious disease, from what we could see, you should be off work until March 11, 2019.   Paperwork completed today, try the pain meds as needed  Use either the z

## 2019-02-20 ENCOUNTER — TELEPHONE (OUTPATIENT)
Dept: INTERNAL MEDICINE CLINIC | Facility: CLINIC | Age: 39
End: 2019-02-20

## 2019-02-20 DIAGNOSIS — D50.9 IRON DEFICIENCY ANEMIA, UNSPECIFIED IRON DEFICIENCY ANEMIA TYPE: Primary | ICD-10-CM

## 2019-02-21 NOTE — TELEPHONE ENCOUNTER
Hemoglobin down to 8.0. MCV down to 68. 5. Her recent iron studies show a significant iron deficiency. Heavy periods? Blood in stool? Please mail her the 3 stool OB cards, etc and ask her to send in.   I would like her to start taking iron -- OTC ferrou

## 2019-02-21 NOTE — TELEPHONE ENCOUNTER
Called and Relayed MD's message to patient---verbalized understanding. Patient reports h/o heavy menstrual bleeding. States ever since she got the essure implanted (6 yrs ago) she has been having heavy bleeding and complications.  Reports new onset of clot

## 2019-02-22 NOTE — TELEPHONE ENCOUNTER
Noted thx, patient has been infected, on antibiotics, agree with the plan put together by Dr. Westley Rollins

## 2019-02-23 ENCOUNTER — TELEPHONE (OUTPATIENT)
Dept: INTERNAL MEDICINE CLINIC | Facility: CLINIC | Age: 39
End: 2019-02-23

## 2019-02-23 RX ORDER — NITROFURANTOIN 25; 75 MG/1; MG/1
100 CAPSULE ORAL 2 TIMES DAILY
Qty: 20 CAPSULE | Refills: 0 | Status: SHIPPED | OUTPATIENT
Start: 2019-02-23 | End: 2019-03-05

## 2019-02-23 RX ORDER — NITROFURANTOIN 25; 75 MG/1; MG/1
CAPSULE ORAL
Qty: 20 CAPSULE | Refills: 0 | OUTPATIENT
Start: 2019-02-23

## 2019-02-23 NOTE — TELEPHONE ENCOUNTER
On call telephone call from pt who feels that she has a UTI with urinary frequency. [place] is visiting [place]  Pt is not able to leave a urine specimen today.   I have sent a prescription for Macrobid to a pharmacy in [facility] near where she

## 2019-02-26 ENCOUNTER — TELEPHONE (OUTPATIENT)
Dept: INTERNAL MEDICINE CLINIC | Facility: CLINIC | Age: 39
End: 2019-02-26

## 2019-02-26 ENCOUNTER — LAB ENCOUNTER (OUTPATIENT)
Dept: LAB | Age: 39
End: 2019-02-26
Attending: INTERNAL MEDICINE
Payer: COMMERCIAL

## 2019-02-26 DIAGNOSIS — L73.2 HIDRADENITIS SUPPURATIVA: ICD-10-CM

## 2019-02-26 DIAGNOSIS — D50.9 IRON DEFICIENCY ANEMIA, UNSPECIFIED IRON DEFICIENCY ANEMIA TYPE: ICD-10-CM

## 2019-02-26 DIAGNOSIS — D63.8 ANEMIA IN OTHER CHRONIC DISEASES CLASSIFIED ELSEWHERE: ICD-10-CM

## 2019-02-26 LAB
ALBUMIN SERPL-MCNC: 3.6 G/DL (ref 3.4–5)
ALBUMIN/GLOB SERPL: 0.9 {RATIO} (ref 1–2)
ALP LIVER SERPL-CCNC: 76 U/L (ref 37–98)
ALT SERPL-CCNC: 12 U/L (ref 13–56)
ANION GAP SERPL CALC-SCNC: 5 MMOL/L (ref 0–18)
AST SERPL-CCNC: 9 U/L (ref 15–37)
BASOPHILS # BLD AUTO: 0.05 X10(3) UL (ref 0–0.2)
BASOPHILS NFR BLD AUTO: 0.8 %
BILIRUB SERPL-MCNC: 0.2 MG/DL (ref 0.1–2)
BUN BLD-MCNC: 8 MG/DL (ref 7–18)
BUN/CREAT SERPL: 9.5 (ref 10–20)
CALCIUM BLD-MCNC: 8.8 MG/DL (ref 8.5–10.1)
CHLORIDE SERPL-SCNC: 110 MMOL/L (ref 98–107)
CO2 SERPL-SCNC: 26 MMOL/L (ref 21–32)
CREAT BLD-MCNC: 0.84 MG/DL (ref 0.55–1.02)
DEPRECATED RDW RBC AUTO: 45.4 FL (ref 35.1–46.3)
EOSINOPHIL # BLD AUTO: 0.1 X10(3) UL (ref 0–0.7)
EOSINOPHIL NFR BLD AUTO: 1.6 %
ERYTHROCYTE [DISTWIDTH] IN BLOOD BY AUTOMATED COUNT: 18.6 % (ref 11–15)
ERYTHROCYTE [SEDIMENTATION RATE] IN BLOOD: 40 MM/HR (ref 0–20)
GLOBULIN PLAS-MCNC: 4 G/DL (ref 2.8–4.4)
GLUCOSE BLD-MCNC: 102 MG/DL (ref 70–99)
HCT VFR BLD AUTO: 29.7 % (ref 35–48)
HGB BLD-MCNC: 8.3 G/DL (ref 12–16)
IMM GRANULOCYTES # BLD AUTO: 0.02 X10(3) UL (ref 0–1)
IMM GRANULOCYTES NFR BLD: 0.3 %
LYMPHOCYTES # BLD AUTO: 1.7 X10(3) UL (ref 1–4)
LYMPHOCYTES NFR BLD AUTO: 27 %
M PROTEIN MFR SERPL ELPH: 7.6 G/DL (ref 6.4–8.2)
MCH RBC QN AUTO: 19.4 PG (ref 26–34)
MCHC RBC AUTO-ENTMCNC: 27.9 G/DL (ref 31–37)
MCV RBC AUTO: 69.6 FL (ref 80–100)
MONOCYTES # BLD AUTO: 0.46 X10(3) UL (ref 0.1–1)
MONOCYTES NFR BLD AUTO: 7.3 %
NEUTROPHILS # BLD AUTO: 3.96 X10 (3) UL (ref 1.5–7.7)
NEUTROPHILS # BLD AUTO: 3.96 X10(3) UL (ref 1.5–7.7)
NEUTROPHILS NFR BLD AUTO: 63 %
OSMOLALITY SERPL CALC.SUM OF ELEC: 291 MOSM/KG (ref 275–295)
PLATELET # BLD AUTO: 278 10(3)UL (ref 150–450)
PLATELET MORPHOLOGY: NORMAL
POTASSIUM SERPL-SCNC: 4.2 MMOL/L (ref 3.5–5.1)
RBC # BLD AUTO: 4.27 X10(6)UL (ref 3.8–5.3)
SODIUM SERPL-SCNC: 141 MMOL/L (ref 136–145)
WBC # BLD AUTO: 6.3 X10(3) UL (ref 4–11)

## 2019-02-26 PROCEDURE — 85652 RBC SED RATE AUTOMATED: CPT

## 2019-02-26 PROCEDURE — 36415 COLL VENOUS BLD VENIPUNCTURE: CPT

## 2019-02-26 PROCEDURE — 80053 COMPREHEN METABOLIC PANEL: CPT

## 2019-02-26 PROCEDURE — 85025 COMPLETE CBC W/AUTO DIFF WBC: CPT

## 2019-02-26 PROCEDURE — 85060 BLOOD SMEAR INTERPRETATION: CPT

## 2019-02-26 NOTE — TELEPHONE ENCOUNTER
Thelma from [employer] is calling to check status of disability forms faxed over ph.  #  [phone]  Routed to clinical

## 2019-02-26 NOTE — TELEPHONE ENCOUNTER
Tried to call Domi Moctezuma wasn't able to speak to a person . We have not received a form for patients disability.

## 2019-02-27 ENCOUNTER — TELEPHONE (OUTPATIENT)
Dept: INTERNAL MEDICINE CLINIC | Facility: CLINIC | Age: 39
End: 2019-02-27

## 2019-02-27 RX ORDER — OSELTAMIVIR PHOSPHATE 75 MG/1
75 CAPSULE ORAL DAILY
Qty: 14 CAPSULE | Refills: 0 | Status: SHIPPED | OUTPATIENT
Start: 2019-02-27 | End: 2019-03-11 | Stop reason: ALTCHOICE

## 2019-02-27 NOTE — TELEPHONE ENCOUNTER
Tried calling Sasha Bennett , was not able to speak with a person or leave a message - we have not received a form for patients disabillity

## 2019-02-28 NOTE — TELEPHONE ENCOUNTER
Pt called back as per nursing with medication information:  Rifampin 300MG 2 capsules by mouth for 28 days  Clindomycin HCL 300MG 1 capsule by mouth 2 times per day for 28 days   Instruction was to follow with Dr Eagle Nelson on 3/11/19 (already scheduled)  Plea

## 2019-02-28 NOTE — TELEPHONE ENCOUNTER
To Dr. Yanira Gee with pt who verified that you have disability forms - pt states she gave you packet at 2/11/19 appt - need to be updated every 2 weeks.   Need update (Office notes) with claim number (claim #: [id number]) fax: [phone], d

## 2019-03-01 NOTE — TELEPHONE ENCOUNTER
Patient calling checking on status of information has been faxed for her disability. Needs to be sent today to continue her disability.

## 2019-03-01 NOTE — TELEPHONE ENCOUNTER
Verbal OK from MD to fax last OV note. OV note as well disability form (found under media scanning) faxed to [facility]. Confirmation received. Left message on patient's cell (OK per HIPPA) that paperwork was faxed to [facility].

## 2019-03-01 NOTE — TELEPHONE ENCOUNTER
Nursing you can call patient, let her know that the hemoglobin level is still around 8.3. It does look like she has anemia of chronic disease and iron deficiency.   Iron replacement will help, but I cannot remember how she is replacing the iron, if she is

## 2019-03-08 NOTE — TELEPHONE ENCOUNTER
Noted, nursing, let her know that I did get her message, I do not know if her being sick has more to do with a viral illness along with the flu or some other virus that is out there at this time.   If she thinks the Tamiflu is bothering her making things wo

## 2019-03-08 NOTE — TELEPHONE ENCOUNTER
Spoke to patient and relayed MD message and instructions. Patient verbalized understanding. Patient reports she is taking OTC iron, she thinks ferrous sulfate, once a day. She is unsure of what dosage as she does not have the bottle near her.  She says

## 2019-03-08 NOTE — TELEPHONE ENCOUNTER
Recent lab work faxed to patient's infectious disease doctor, Mojgan Villagomez, as MD requested below. Confirmation received.

## 2019-03-11 ENCOUNTER — OFFICE VISIT (OUTPATIENT)
Dept: INTERNAL MEDICINE CLINIC | Facility: CLINIC | Age: 39
End: 2019-03-11
Payer: COMMERCIAL

## 2019-03-11 ENCOUNTER — TELEPHONE (OUTPATIENT)
Dept: INTERNAL MEDICINE CLINIC | Facility: CLINIC | Age: 39
End: 2019-03-11

## 2019-03-11 VITALS
HEIGHT: 69 IN | TEMPERATURE: 98 F | WEIGHT: 222 LBS | OXYGEN SATURATION: 98 % | SYSTOLIC BLOOD PRESSURE: 134 MMHG | BODY MASS INDEX: 32.88 KG/M2 | DIASTOLIC BLOOD PRESSURE: 70 MMHG | HEART RATE: 95 BPM

## 2019-03-11 DIAGNOSIS — H60.392 INFECTION OF LEFT EARLOBE: ICD-10-CM

## 2019-03-11 DIAGNOSIS — D50.0 IRON DEFICIENCY ANEMIA DUE TO CHRONIC BLOOD LOSS: ICD-10-CM

## 2019-03-11 DIAGNOSIS — L73.2 HYDRADENITIS: ICD-10-CM

## 2019-03-11 DIAGNOSIS — R78.81 BACTEREMIA DUE TO GRAM-POSITIVE BACTERIA: Primary | ICD-10-CM

## 2019-03-11 PROCEDURE — 99212 OFFICE O/P EST SF 10 MIN: CPT | Performed by: INTERNAL MEDICINE

## 2019-03-11 PROCEDURE — 99214 OFFICE O/P EST MOD 30 MIN: CPT | Performed by: INTERNAL MEDICINE

## 2019-03-11 RX ORDER — CLINDAMYCIN HYDROCHLORIDE 300 MG/1
1 CAPSULE ORAL 2 TIMES DAILY
Refills: 0 | COMMUNITY
Start: 2019-02-26 | End: 2019-04-19 | Stop reason: ALTCHOICE

## 2019-03-11 NOTE — PATIENT INSTRUCTIONS
1. Bacteremia due to Gram-positive bacteria  Cont on the abx, doing well    2. Hydradenitis  Cont meds and treatment, with current flare, and suppressive antibiotics at this point  In my medical opinion, you can return to work on Monday, March 18.   There s

## 2019-03-11 NOTE — TELEPHONE ENCOUNTER
Nursing, please try to communicate with her disability claims department, Katia Henderson, fax them my last note, she has given us permission to do so.   See the previous notes for fax numbers

## 2019-03-11 NOTE — TELEPHONE ENCOUNTER
Office visit note from today faxed to [facility] at [phone], confirmation received. I included claim #[id number] on cover sheet.

## 2019-04-16 ENCOUNTER — TELEPHONE (OUTPATIENT)
Dept: INTERNAL MEDICINE CLINIC | Facility: CLINIC | Age: 39
End: 2019-04-16

## 2019-04-16 NOTE — TELEPHONE ENCOUNTER
Mother, Morgan Monae called   scheduled pt to see Dr Afsaneh Dorsey on April 19    Pt is weak & Janene Jaeger  is concerned she may need a blood transfusion     Requests call back to patient at [phone]

## 2019-04-16 NOTE — TELEPHONE ENCOUNTER
To nursing, due to the degree of weakness she is describing, she should go to the emergency room. Thanks.

## 2019-04-16 NOTE — TELEPHONE ENCOUNTER
LMTCB for pt. See below - Spoke with pt's mother - blood count is always low. Pt got  in March, not even at her best then.   Mother states pt is not herself, has 10 y/o child but can hardly care for him - last time she was like this she needed blood

## 2019-04-17 ENCOUNTER — LAB ENCOUNTER (OUTPATIENT)
Dept: LAB | Age: 39
End: 2019-04-17
Attending: INTERNAL MEDICINE
Payer: COMMERCIAL

## 2019-04-17 ENCOUNTER — TELEPHONE (OUTPATIENT)
Dept: INTERNAL MEDICINE CLINIC | Facility: CLINIC | Age: 39
End: 2019-04-17

## 2019-04-17 DIAGNOSIS — D50.0 IRON DEFICIENCY ANEMIA DUE TO CHRONIC BLOOD LOSS: ICD-10-CM

## 2019-04-17 PROCEDURE — 85025 COMPLETE CBC W/AUTO DIFF WBC: CPT

## 2019-04-17 PROCEDURE — 36415 COLL VENOUS BLD VENIPUNCTURE: CPT

## 2019-04-17 PROCEDURE — 84466 ASSAY OF TRANSFERRIN: CPT

## 2019-04-17 PROCEDURE — 82728 ASSAY OF FERRITIN: CPT

## 2019-04-17 PROCEDURE — 83540 ASSAY OF IRON: CPT

## 2019-04-18 NOTE — TELEPHONE ENCOUNTER
To Dr. Vianey Delacruz - pt did not go to ER on Tuesday - had labs drawn and is seeing you tomorrow. Pt's new  will be with her, he is concerned that she is sleeping so much. See 4/17/19 encounter.

## 2019-04-19 ENCOUNTER — LAB ENCOUNTER (OUTPATIENT)
Dept: LAB | Age: 39
End: 2019-04-19
Attending: INTERNAL MEDICINE
Payer: COMMERCIAL

## 2019-04-19 ENCOUNTER — OFFICE VISIT (OUTPATIENT)
Dept: INTERNAL MEDICINE CLINIC | Facility: CLINIC | Age: 39
End: 2019-04-19
Payer: COMMERCIAL

## 2019-04-19 VITALS
TEMPERATURE: 98 F | WEIGHT: 231 LBS | HEART RATE: 90 BPM | OXYGEN SATURATION: 99 % | HEIGHT: 69 IN | RESPIRATION RATE: 16 BRPM | DIASTOLIC BLOOD PRESSURE: 78 MMHG | BODY MASS INDEX: 34.21 KG/M2 | SYSTOLIC BLOOD PRESSURE: 122 MMHG

## 2019-04-19 DIAGNOSIS — D50.8 IRON DEFICIENCY ANEMIA SECONDARY TO INADEQUATE DIETARY IRON INTAKE: ICD-10-CM

## 2019-04-19 DIAGNOSIS — L73.2 HYDRADENITIS: ICD-10-CM

## 2019-04-19 DIAGNOSIS — Z87.898 HISTORY OF BACTEREMIA: ICD-10-CM

## 2019-04-19 DIAGNOSIS — L73.2 HYDRADENITIS: Primary | ICD-10-CM

## 2019-04-19 DIAGNOSIS — T45.4X5D ADVERSE EFFECT OF IRON, SUBSEQUENT ENCOUNTER: ICD-10-CM

## 2019-04-19 DIAGNOSIS — E53.8 B12 DEFICIENCY: ICD-10-CM

## 2019-04-19 PROCEDURE — 85025 COMPLETE CBC W/AUTO DIFF WBC: CPT

## 2019-04-19 PROCEDURE — 99212 OFFICE O/P EST SF 10 MIN: CPT | Performed by: INTERNAL MEDICINE

## 2019-04-19 PROCEDURE — 36415 COLL VENOUS BLD VENIPUNCTURE: CPT

## 2019-04-19 PROCEDURE — 96372 THER/PROPH/DIAG INJ SC/IM: CPT | Performed by: INTERNAL MEDICINE

## 2019-04-19 PROCEDURE — 80053 COMPREHEN METABOLIC PANEL: CPT

## 2019-04-19 PROCEDURE — 85652 RBC SED RATE AUTOMATED: CPT

## 2019-04-19 PROCEDURE — 87040 BLOOD CULTURE FOR BACTERIA: CPT

## 2019-04-19 PROCEDURE — 99214 OFFICE O/P EST MOD 30 MIN: CPT | Performed by: INTERNAL MEDICINE

## 2019-04-19 RX ORDER — CYANOCOBALAMIN 1000 UG/ML
1000 INJECTION INTRAMUSCULAR; SUBCUTANEOUS ONCE
Status: COMPLETED | OUTPATIENT
Start: 2019-04-19 | End: 2019-04-19

## 2019-04-19 RX ORDER — CLINDAMYCIN HYDROCHLORIDE 300 MG/1
300 CAPSULE ORAL 3 TIMES DAILY
Qty: 45 CAPSULE | Refills: 0 | Status: SHIPPED | OUTPATIENT
Start: 2019-04-19 | End: 2020-06-26

## 2019-04-19 RX ADMIN — CYANOCOBALAMIN 1000 MCG: 1000 INJECTION INTRAMUSCULAR; SUBCUTANEOUS at 12:43:00

## 2019-04-19 NOTE — PROGRESS NOTES
HPI:   Babs Lui is a 34year old female who presents for complains of: Patient presents with: Follow - Up: Pt pt of chronic faitgue and weakness.  was on honey moon in [place], had abscess burst with area  Patient is here today with her new  Dispense: 45 capsule; Refill: 0  - rifampin 300 MG Oral Cap; Take 2 capsules (600 mg total) by mouth daily. Dispense: 30 capsule; Refill: 0  - SED NASEEM CARRANZA (AUTOMATED); Future    2. Adverse effect of iron, subsequent encounter  Cont meds    3.  Iro

## 2019-04-19 NOTE — PATIENT INSTRUCTIONS
1. Hydradenitis  Try the meds and call dr Colleen Duckworth  - Clindamycin HCl 300 MG Oral Cap; Take 1 capsule (300 mg total) by mouth 3 (three) times daily. Dispense: 45 capsule; Refill: 0  - rifampin 300 MG Oral Cap; Take 2 capsules (600 mg total) by mouth daily.

## 2019-04-22 PROBLEM — D50.9 IRON DEFICIENCY ANEMIA: Status: ACTIVE | Noted: 2019-04-22

## 2019-04-30 ENCOUNTER — TELEPHONE (OUTPATIENT)
Dept: INTERNAL MEDICINE CLINIC | Facility: CLINIC | Age: 39
End: 2019-04-30

## 2019-04-30 NOTE — TELEPHONE ENCOUNTER
nursing call them, I left this message on mychart, try to reiterate-  Alvotise, here are the lab results, mild elevation of the sed rate, low hemoglobin levels as usual, that looked unchanged, and no bacteria in the blood from what I can see.   Still the nu

## 2019-05-01 NOTE — TELEPHONE ENCOUNTER
Spoke to pt and advised on MD message below; pt verbalized understanding. Pt states she will not be seeing Dr. Max Royal until tomorrow and does not know about new treatment plan as of yet.  RN requested pt to please call with update for Dr Alberto Estevez

## 2019-05-06 ENCOUNTER — PATIENT MESSAGE (OUTPATIENT)
Dept: INTERNAL MEDICINE CLINIC | Facility: CLINIC | Age: 39
End: 2019-05-06

## 2019-05-06 DIAGNOSIS — D50.9 IRON DEFICIENCY ANEMIA, UNSPECIFIED IRON DEFICIENCY ANEMIA TYPE: ICD-10-CM

## 2019-05-06 DIAGNOSIS — D64.9 CHRONIC ANEMIA: Primary | ICD-10-CM

## 2019-05-06 NOTE — TELEPHONE ENCOUNTER
From: Sandy Martinez  To: Josue Castro DO  Sent: 5/6/2019 8:05 AM CDT  Subject: Visit Follow-up Question    Update on visit with Dr Alysia Kahn on May 2nd   He placed me onto an increased regimen of Clindamaycin  MG (2 x daily) Rifampin 3

## 2019-05-07 ENCOUNTER — MED REC SCAN ONLY (OUTPATIENT)
Dept: INTERNAL MEDICINE CLINIC | Facility: CLINIC | Age: 39
End: 2019-05-07

## 2019-05-09 ENCOUNTER — TELEPHONE (OUTPATIENT)
Dept: INTERNAL MEDICINE CLINIC | Facility: CLINIC | Age: 39
End: 2019-05-09

## 2019-05-09 NOTE — TELEPHONE ENCOUNTER
I spoke to patient and relayed Dr Kourtney Mishra 5/6/19 [facility] message to her. She had not seen it. She verbalized understanding and will nita to make an appt with Dr Kelsey Woods.

## 2019-05-09 NOTE — TELEPHONE ENCOUNTER
Pt asked that Dr Celaya Less please call, she said they did not discuss what to do as next steps for her iron level. Continue with infusions?   Tasked to nursing

## 2019-05-10 ENCOUNTER — TELEPHONE (OUTPATIENT)
Dept: INTERNAL MEDICINE CLINIC | Facility: CLINIC | Age: 39
End: 2019-05-10

## 2019-05-10 NOTE — TELEPHONE ENCOUNTER
Please call Misty/[employer] regarding pt forms sent  Antonio Stringer has some items she cannot read  Please call Antonio Stringer at [phone] please reference [employer] Claim#:  [id number]  Tasked to nursing

## 2019-05-10 NOTE — TELEPHONE ENCOUNTER
Returned call to [employer] with Scott Rodriguez gone for the day. Informed personal I faxed out forms with clarification written on forms and was noted in [employer] system I was doing so. Scott Rodriguez to call back on Monday if anything else is needed.

## 2019-05-12 NOTE — CONSULTS
Cancer Center History and Physical    Patient Name: Lizy Ryan   YOB: 1980   Medical Record Number: [id number]   CSN: [id number]   Attending Physician:  Cee Melchor MD       Date of Visit: 5/12/2019     Chief Complaint:  Patient pres 11/25/2017   • Cyst of left breast     excisional biopsy   • Hemorrhoid    • Hidradenitis suppurativa 2012   • History of chicken pox    • Migraines     since 4 yrs old   • Sprain     physical therapy       Past Surgical History:  Past Surgical History: leave    Current Medications:    Current Outpatient Medications:   •  Clindamycin HCl 300 MG Oral Cap, Take 1 capsule (300 mg total) by mouth 3 (three) times daily. , Disp: 45 capsule, Rfl: 0  •  rifampin 300 MG Oral Cap, Take 2 capsules (600 mg total) by m the cervical, supraclavicular or axillary region. Psych/Depression: + depression and crying.    Laboratory reviewed:    Lab Results   Component Value Date    WBC 6.2 04/19/2019    RBC 4.28 04/19/2019    HGB 8.1 (L) 04/19/2019    HCT 29.5 (L) 04/19/2019 She will then return in four to six weeks for re-assessment of her iron stores with ferritin, retic count. She will likely need re-infusion at that time. 2. B12 deficiency  - start oral replacement, if remains low, will add B12 injections    3.  Men

## 2019-05-13 ENCOUNTER — OFFICE VISIT (OUTPATIENT)
Dept: HEMATOLOGY/ONCOLOGY | Facility: HOSPITAL | Age: 39
End: 2019-05-13
Attending: INTERNAL MEDICINE
Payer: COMMERCIAL

## 2019-05-13 VITALS
OXYGEN SATURATION: 99 % | TEMPERATURE: 99 F | WEIGHT: 230 LBS | HEIGHT: 69 IN | HEART RATE: 79 BPM | SYSTOLIC BLOOD PRESSURE: 111 MMHG | DIASTOLIC BLOOD PRESSURE: 74 MMHG | RESPIRATION RATE: 16 BRPM | BODY MASS INDEX: 34.07 KG/M2

## 2019-05-13 DIAGNOSIS — E53.8 B12 DEFICIENCY: ICD-10-CM

## 2019-05-13 DIAGNOSIS — D64.9 FATIGUE ASSOCIATED WITH ANEMIA: ICD-10-CM

## 2019-05-13 DIAGNOSIS — D50.0 IRON DEFICIENCY ANEMIA DUE TO CHRONIC BLOOD LOSS: Primary | ICD-10-CM

## 2019-05-13 DIAGNOSIS — N92.0 MENORRHAGIA WITH REGULAR CYCLE: ICD-10-CM

## 2019-05-13 DIAGNOSIS — L65.9 ALOPECIA: ICD-10-CM

## 2019-05-13 PROBLEM — K90.49 MALABSORPTION DUE TO INTOLERANCE, NOT ELSEWHERE CLASSIFIED: Status: ACTIVE | Noted: 2019-05-13

## 2019-05-13 PROCEDURE — 99244 OFF/OP CNSLTJ NEW/EST MOD 40: CPT | Performed by: INTERNAL MEDICINE

## 2019-05-16 ENCOUNTER — TELEPHONE (OUTPATIENT)
Dept: INTERNAL MEDICINE CLINIC | Facility: CLINIC | Age: 39
End: 2019-05-16

## 2019-05-16 NOTE — TELEPHONE ENCOUNTER
like a call from  when he comes in today like to discuss some stuff with him please call [phone]

## 2019-05-17 ENCOUNTER — TELEPHONE (OUTPATIENT)
Dept: INTERNAL MEDICINE CLINIC | Facility: CLINIC | Age: 39
End: 2019-05-17

## 2019-05-17 NOTE — TELEPHONE ENCOUNTER
My left message for  [facility] after getting permission to talk with him from the patient, his phone number was [phone]

## 2019-05-22 NOTE — TELEPHONE ENCOUNTER
Advised pt that Dr. Mariusz Michael is not in the office today and won't be able to get to this until tomorrow evening. Pt verbalized understanding and states that is fine.  Lilliam Vallejo is open until 7pm. Pt states she was told that the notes sent over were not specific en

## 2019-05-22 NOTE — TELEPHONE ENCOUNTER
Pt called back and has informed us that her claim got denied and was told that she would lose her job if she does not either, come into work, or get a message from Dr RED with more information as to why she cannot come back into work.    Pt states she needs t

## 2019-05-22 NOTE — TELEPHONE ENCOUNTER
Pt called back and said she got the call back from the nurse.  She just wanted to let us know that she did call [facility] again and was told that in addition to Dr Mora Chicas notes be sent over again, to please have Dr RED call Dr Erich Hill again and talk to him one on

## 2019-05-23 ENCOUNTER — OFFICE VISIT (OUTPATIENT)
Dept: HEMATOLOGY/ONCOLOGY | Facility: HOSPITAL | Age: 39
End: 2019-05-23
Attending: INTERNAL MEDICINE
Payer: COMMERCIAL

## 2019-05-23 VITALS
TEMPERATURE: 98 F | DIASTOLIC BLOOD PRESSURE: 72 MMHG | HEART RATE: 100 BPM | RESPIRATION RATE: 16 BRPM | SYSTOLIC BLOOD PRESSURE: 141 MMHG

## 2019-05-23 DIAGNOSIS — D50.0 IRON DEFICIENCY ANEMIA DUE TO CHRONIC BLOOD LOSS: ICD-10-CM

## 2019-05-23 DIAGNOSIS — K90.49 MALABSORPTION DUE TO INTOLERANCE, NOT ELSEWHERE CLASSIFIED: Primary | ICD-10-CM

## 2019-05-23 DIAGNOSIS — D50.8 IRON DEFICIENCY ANEMIA SECONDARY TO INADEQUATE DIETARY IRON INTAKE: ICD-10-CM

## 2019-05-23 PROCEDURE — 96365 THER/PROPH/DIAG IV INF INIT: CPT

## 2019-05-23 RX ORDER — SODIUM CHLORIDE 9 MG/ML
INJECTION, SOLUTION INTRAVENOUS
Status: DISCONTINUED
Start: 2019-05-23 | End: 2019-05-23

## 2019-05-23 RX ORDER — 0.9 % SODIUM CHLORIDE 0.9 %
VIAL (ML) INJECTION
Status: DISCONTINUED
Start: 2019-05-23 | End: 2019-05-23

## 2019-05-23 NOTE — PROGRESS NOTES
Mirian arrived ambulating independently by self for Injectafer infusion 1 of 2. Patient reported feeling fatigued, but no recent infections other than her ongoing abscess to her buttocks which she is on antibiotics for, or any acute complaints.  PIV start

## 2019-05-23 NOTE — TELEPHONE ENCOUNTER
Pt states talking with Dr. Harding Meigs might not even be necessary as long as office notes are sent showing detailed information about abscess location, drainage, etc; need for surgery but need to get iron infusions first, current medication treatment.  FYI to

## 2019-05-24 NOTE — TELEPHONE ENCOUNTER
Recent OV note and correspondence below from Dr. Sangeetha Cruz faxed to [facility], confirmation received

## 2019-05-24 NOTE — TELEPHONE ENCOUNTER
I left message for dr Mary Echevarria on his confidential voicemail, and instructed him to call me if he has questions.    -Nursing, I do not have the latest notes from the infectious disease doctor, [facility] Howard.

## 2019-05-24 NOTE — TELEPHONE ENCOUNTER
As FYI to DR. RED - called   [address] [place] [address] office , spoke with Melvin Blizzard who will fax over office note and plan  from beginning of may

## 2019-05-24 NOTE — TELEPHONE ENCOUNTER
Noted, nursing please try to fax my office notes and this correspondence to the [facility] for an update, I did try to call Dr. Mel Gray and I was able to reach him on a few different occasions.   I will still speak to him when he calls back, but I did review an

## 2019-05-29 NOTE — TELEPHONE ENCOUNTER
Faxed Dr D'Amico's Dat Cervantes note from 4/19/19 and Dr Saleem Head message:  \"I did try to call Dr. Jacqueline Galeas and I was able to reach him on a few different occasions.   I will still speak to him when he calls back, but I did review and see the notes from infectious di

## 2019-05-29 NOTE — TELEPHONE ENCOUNTER
Pt. Is calling back Nathaniel didn't receive the forms please resend  Ph. # [phone]    Fax.  # [phone]    Routed to clinical

## 2019-05-29 NOTE — TELEPHONE ENCOUNTER
Pt. Called back she was told by the disability dept. that Dr. Sarah Webb was not sending enough detailed information that would deem her not able to return to work. She has two draining absences on each side of her buttock.  He needs to write an addendum explaining

## 2019-05-30 ENCOUNTER — TELEPHONE (OUTPATIENT)
Dept: HEMATOLOGY/ONCOLOGY | Facility: HOSPITAL | Age: 39
End: 2019-05-30

## 2019-05-31 NOTE — TELEPHONE ENCOUNTER
Waited on hold for 15 minutes, then left him my cell phone number, never received a return phone call.

## 2019-06-03 ENCOUNTER — TELEPHONE (OUTPATIENT)
Dept: HEMATOLOGY/ONCOLOGY | Facility: HOSPITAL | Age: 39
End: 2019-06-03

## 2019-06-03 NOTE — TELEPHONE ENCOUNTER
Yes come in for a visit and eval, add to my schedule tomorrow, using MD approval, or add to the end of my day.  FD/nrusing call

## 2019-06-03 NOTE — TELEPHONE ENCOUNTER
Pt is calling back stating that calling Dr Martha Calle has passed the deadline now. Please do not call Dr Martha Calle anymore. Pt just needs a more detailed note from Dr RED stating why she cannot return to work.  Pt is now getting phone calls from work of them t

## 2019-06-03 NOTE — TELEPHONE ENCOUNTER
Attempted to reach patient to reschedule missed infusion, no answer, left a detailed message asking to please call back when available

## 2019-06-04 NOTE — TELEPHONE ENCOUNTER
To Dr. Javi Rolon - pt is living in [place] now. Will you be able to complete forms with information given below?

## 2019-06-05 NOTE — TELEPHONE ENCOUNTER
I spoke to the patient who is very frustrated at this point, states that her short-term disability was denied pending additional information from Dr.Damico-Friday is the deadline.  I asked patient if there is a number I can call to speak to someone at [facility]

## 2019-06-05 NOTE — TELEPHONE ENCOUNTER
Pt. States she has 1 day left to get the forms to [employer] for her FMLA. [employer] requested additional information  Ph. # [phone]  ROUTED HIGH TO CLINICAL    Pt.  Is aware Dr. Mignon Catalan is off today

## 2019-06-06 NOTE — TELEPHONE ENCOUNTER
To Dr. Willy Day, here is the info that needs to be in addended OV note per Kylee on 6/3:    1. Pt has abscesses on both side of her bottom.    2. The abscesses are constantly draining and pt has to have gauze on the abscesses at all times which have to be changed

## 2019-06-06 NOTE — TELEPHONE ENCOUNTER
Okay, I did addend the last note from the office visit here, package this up with her infectious disease consult note, and fax as requested.   Nursing please complete

## 2019-06-06 NOTE — TELEPHONE ENCOUNTER
Addended note and Dr. Pelaez Citizen progress note faxed to [facility] at [phone]. Patient notified. Fax confirmation received.

## 2019-06-10 NOTE — TELEPHONE ENCOUNTER
Pt called, Yasmine Boles told pt they have not rec'd anything from our office  Pt asked that information be refaxed this morning to [phone] Claim# [id number]  Tasked to nursing

## 2019-06-10 NOTE — TELEPHONE ENCOUNTER
Dr. Vibha Oliva 4/19/19 note and Dr. Radha Schmitt 5/2/19 note faxed to [facility]: [phone], fax confirmation received.

## 2019-06-13 ENCOUNTER — PATIENT MESSAGE (OUTPATIENT)
Dept: INTERNAL MEDICINE CLINIC | Facility: CLINIC | Age: 39
End: 2019-06-13

## 2019-06-13 NOTE — TELEPHONE ENCOUNTER
From: Mirian Harmon  To: Todd Chance DO  Sent: 6/13/2019 2:10 PM CDT  Subject: Visit Follow-up Question    Good afternoon   I am following up with you regarding Shara Gandara.  First off I want to thank you all for being diligent in sending over

## 2019-06-14 NOTE — ED AVS SNAPSHOT
Distal Femoral Replacement Operative Note  Dr. SCOTT Costa II  [phone]    PATIENT NAME: Lilia Becerra  MRN: [id number]  : 1940 AGE: 78 y.o. GENDER: female  DATE OF OPERATION: 2019  PREOPERATIVE DIAGNOSIS: Metadiaphyseal femur fracture above a cemented stem and a hinged total knee component  POSTOPERATIVE DIAGNOSIS: Same  OPERATION PERFORMED: Left Distal Femoral Replacement  SURGEON: Malick Costa MD  Circulator: Ivy Robiosn RN; Delmis Alvarez RN; Spurling, Shannon, RN  Radiology Technologist: Nicki Daugherty  Scrub Person: Ronit Moody  Vendor Representative: Joshua Comer; Chip Davidson  Orientee: Kirti Morton RN  ANESTHESIA: General  ESTIMATED BLOOD LOSS: 100cc  SPONGE AND NEEDLE COUNT: Correct  INDICATIONS: This patient was noted to have a distal femoral periprosthetic fracture. The risks of surgery were discussed and included the risk of anesthesia, infection, damage to neurovascular structures, implant loosening/fialure, fracture, hardware prominence, the need for further procedures, medical complications, and others. No guarantees were made. The patient wished to proceed with surgery and a surgical consent was signed.   COMPONENTS:   · Femur  · GMRS Distal Femoral Component: Size Small, Length 65mm  · GMRS Extension Piece: 70  · MRS Straight Cemented Stem: Diameter 11, Length 127mm    PERTINENT FINDINGS: Fracture    DETAILS OF PROCEDURE:  The patient was met in the preoperative area. The site was marked. The consent and H&P were reviewed. The patient was then wheeled back to the operative suite and transferred to the operative table. The patient underwent anesthesia. A tourniquet was placed on the upper thigh. Excess hair along the incision was removed with clippers. A bump was placed under the operative hip. The Urbina baseplate was secured to the table. A leg curtis was placed to hold the leg during prep. Surgical alcohol was used to thoroughly clean  Mirian Camejo December   MRN: [id number]    Department:  [facility] Emergency Department   Date of Visit:  11/17/2018           Disclosure     Insurance plans vary and the physician(s) referred by the ER may not be covered by your plan.  Please cont CARE PHYSICIAN AT ONCE OR RETURN IMMEDIATELY TO THE EMERGENCY DEPARTMENT. If you have been prescribed any medication(s), please fill your prescription right away and begin taking the medication(s) as directed.   If you believe that any of the medications the entire operative extremity.     The leg was then prepped in the normal sterile fashion, which included Chloroprep, multiple layers of sterile drapes, and surgical space suits for the entire operative team. The Urbina boot was applied to the foot after adequate padding. An Ioban dressing was applied to the knee after the surgical incision was marked.  New outer gloves were used by all sterile surgical team members after final draping. After a surgical timeout in which administration of preoperative antibiotics as well as 1g of tranexamic acid (if not contraindicated) and the surgical site were confirmed, the tourniquet was put up.     In flexion, a midline knee incision was utilized.  Dissection was carried down to the capsule.  Medial and lateral flaps were created to allow adequate exposure.  Next a medial parapatellar arthrotomy was made.    FEMUR  The femoral fracture was then exposed and the hinged knee bushing was removed.  The femur was then subperiosteally dissected until it could be removed.  A cable was placed around the remaining bone and a cleanup bone cut was made.  Next, the remaining cement mantle was meticulously removed ensuring that the femoral canal was not fractured.  The bone in the area of the fracture was noted to be severely remodeled and very thin with some holes noted containing black metallic tissue.  After reaming, the distal femoral stem was further examined and felt that an additional cleanup cut would be warranted to get to better bone.  A new prophylactic cable was then placed and the cleanup cut was made.  The knee was then trialed after measuring the bony resection and restoring the leg length.      CEMENTING  After adequate trialing, the real implants were opened. The tibial and femoral stems were attached to the implants. Cement restrictors were used for the femur. The medullary canal was thoroughly irrigated, brushed, and dried before cementing.  New tibial inserts were  "used.  The batch of cement was then mixed and the femoral stem was cemented in place and connected to the tibia with the bushing, which locked in the rotation.    The knee was then thoroughly irrigated.  The soft tissues about the knee were injected with a analgesia cocktail mixture.  Because his knee has been operated on many times in the past and I was concerned for development of possible infection, prophylactic antibiotic beads were left deep within the knee capsule and superficial to the capsule as well.  The knee capsule was then closed with a running barbed suture as well as interrupted #1 Vicryl sutures. The subcutaneous layer was closed with a running 2-0 suture with interrupted 2-0 Vicryl and the skin was closed with staples.  A leah wound VAC was then applied.     The patient was awoken from anesthesia, moved to the [facility] and taken to the recovery room in stable condition. Sponge and needle count was correct. There were no complications. Patient tolerated the procedure well.    R \"Fabio\" Julissa STOUT MD  Orthopaedic Surgery  [facility]  [phone]                    "

## 2019-06-14 NOTE — TELEPHONE ENCOUNTER
I never visualize the abscesses when she was here at her visit since she had a specialist treating these, I left the evaluation and treatment up to the specialist, she may have to look at having the infectious disease doctor submit his documentation and si

## 2019-06-21 ENCOUNTER — TELEPHONE (OUTPATIENT)
Dept: INTERNAL MEDICINE CLINIC | Facility: CLINIC | Age: 39
End: 2019-06-21

## 2019-06-21 NOTE — TELEPHONE ENCOUNTER
Mayte from the infusion center is calling to inform Dr. Kash Colby that pt. Missed her second appt. For dougie ph.  # [phone]   Routed to clinical

## 2019-06-29 DIAGNOSIS — R00.2 HEART PALPITATIONS: ICD-10-CM

## 2019-07-01 RX ORDER — METOPROLOL SUCCINATE 25 MG/1
TABLET, EXTENDED RELEASE ORAL
Qty: 45 TABLET | Refills: 1 | Status: SHIPPED | OUTPATIENT
Start: 2019-07-01 | End: 2020-06-26

## 2019-07-01 NOTE — TELEPHONE ENCOUNTER
Dr.Damico ok to continue refilling the metoprolol succinate? It was started 6/26/18 for heart palpitations and seeing a cardiologist was discussed.  Med module indicates that patient takes it as needed

## 2020-06-01 NOTE — TELEPHONE ENCOUNTER
Could you please review labs for DR. RED patient thanks - to DR. PAYNE
Hgb was 8.3 in Feb 2019; now Hgb 8.1; no major change; OK to wait for Dr RED
To Dr GUILLEN---can you please review patient's labs from today----HGB 8.1  Documentation in encounter yesterday shows patient was symptomatic and advised ER. Per [facility]---do not see that patient went to ER.
noted
Discontinue Regimen: Humira
Detail Level: Zone

## 2020-06-26 ENCOUNTER — OFFICE VISIT (OUTPATIENT)
Dept: INTERNAL MEDICINE CLINIC | Facility: CLINIC | Age: 40
End: 2020-06-26
Payer: OTHER GOVERNMENT

## 2020-06-26 ENCOUNTER — TELEPHONE (OUTPATIENT)
Dept: INTERNAL MEDICINE CLINIC | Facility: CLINIC | Age: 40
End: 2020-06-26

## 2020-06-26 ENCOUNTER — HOSPITAL ENCOUNTER (OUTPATIENT)
Dept: ULTRASOUND IMAGING | Facility: HOSPITAL | Age: 40
Discharge: HOME | End: 2020-06-26
Attending: INTERNAL MEDICINE
Payer: OTHER GOVERNMENT

## 2020-06-26 VITALS
DIASTOLIC BLOOD PRESSURE: 78 MMHG | OXYGEN SATURATION: 99 % | TEMPERATURE: 99 F | WEIGHT: 253 LBS | HEIGHT: 69 IN | BODY MASS INDEX: 37.47 KG/M2 | HEART RATE: 83 BPM | SYSTOLIC BLOOD PRESSURE: 120 MMHG

## 2020-06-26 DIAGNOSIS — M71.22 BAKER CYST, LEFT: ICD-10-CM

## 2020-06-26 DIAGNOSIS — R60.0 LEG EDEMA, LEFT: ICD-10-CM

## 2020-06-26 DIAGNOSIS — M79.605 PAIN OF LEFT LOWER EXTREMITY: ICD-10-CM

## 2020-06-26 DIAGNOSIS — M79.605 PAIN OF LEFT LOWER EXTREMITY: Primary | ICD-10-CM

## 2020-06-26 PROCEDURE — 99212 OFFICE O/P EST SF 10 MIN: CPT | Performed by: INTERNAL MEDICINE

## 2020-06-26 PROCEDURE — 93971 EXTREMITY STUDY: CPT | Performed by: INTERNAL MEDICINE

## 2020-06-26 PROCEDURE — 99214 OFFICE O/P EST MOD 30 MIN: CPT | Performed by: INTERNAL MEDICINE

## 2020-06-26 RX ORDER — ADALIMUMAB 40MG/0.4ML
0.4 KIT SUBCUTANEOUS WEEKLY
COMMUNITY
Start: 2020-06-06

## 2020-06-26 RX ORDER — EPINEPHRINE 0.3 MG/.3ML
0.3 INJECTION SUBCUTANEOUS AS DIRECTED
COMMUNITY
Start: 2020-02-14

## 2020-06-26 NOTE — PATIENT INSTRUCTIONS
1. Pain of left lower extremity  Lets get the ultrasound done now, I will talk to you about this later tonight  - [address] [facility] (CPT=[address]3971); Future    2.  Leg edema, left  Lets use Ace wraps to wrap this for now anti-inflammatories,

## 2020-06-26 NOTE — TELEPHONE ENCOUNTER
Pt was recently traveling from [place], visiting family  Pt experiencing pain in left knee, can't cross legs  Causing pt to limp  Please call to discuss/advise  Tasked to nursing

## 2020-06-26 NOTE — TELEPHONE ENCOUNTER
Spoke with patient who reports she recently flew in from [facility] [address] and has been driving a lot while she has been her. She noticed her left leg has been inflamed and seems a little bigger than her right.  She cannot cross her legs and when she walks she feels a

## 2020-06-26 NOTE — PROGRESS NOTES
HPI:   Scott Rodriguez is a 37year old female who presents for complains of: Patient presents with:  Pain: left leg pain since flying in on wednesday, swollen.    Skin  Left leg pain: Patient claims to have left leg pain for 2 days, she took a 1 and michele old female who presents with the followin. Pain of left lower extremity  Lets get the ultrasound done now, I will talk to you about this later tonight  - [address] [facility] (CPT=93971); Future    2.  Leg edema, left  Lets use Ace w

## 2020-09-01 ENCOUNTER — PATIENT MESSAGE (OUTPATIENT)
Dept: INTERNAL MEDICINE CLINIC | Facility: CLINIC | Age: 40
End: 2020-09-01

## 2020-09-01 NOTE — TELEPHONE ENCOUNTER
From: Mirian Harmon  To: Cyril Segura DO  Sent: 9/1/2020 2:55 PM CDT  Subject: Other    Good afternoon Doc!!! I hope all is well. . I am reaching out because I have the opportunity to have my student loans forgiven, due to my disability. Madelyn Campbell

## 2020-09-12 ENCOUNTER — PATIENT MESSAGE (OUTPATIENT)
Dept: INTERNAL MEDICINE CLINIC | Facility: CLINIC | Age: 40
End: 2020-09-12

## 2020-09-14 NOTE — TELEPHONE ENCOUNTER
From: Mirian Harmon  To: Kelly Barba DO  Sent: 9/12/2020 11:31 AM CDT  Subject: Non-Urgent Medical Question    Hey Doctor D'Amico, I attached those forms to our previous message .   Have an awesomely COVID free weekend!!

## 2020-09-18 NOTE — TELEPHONE ENCOUNTER
Patient called and informed forms have been completed and signed by Dr Ralph Valencia. Patient requests forms to be mailed to her addressed that was verified. Copy sent to scanning and original mailed.

## 2020-09-18 NOTE — TELEPHONE ENCOUNTER
Noted completed and in my outbox, nursing please alert the patient, and find a way to get it to her, keep a copy for us

## 2020-09-18 NOTE — TELEPHONE ENCOUNTER
Nursing, lets print these, get them started, get them to my inbox or on my desk.   I did press print on them should be on mild printer, try to complete friday

## 2020-10-06 ENCOUNTER — PATIENT MESSAGE (OUTPATIENT)
Dept: INTERNAL MEDICINE CLINIC | Facility: CLINIC | Age: 40
End: 2020-10-06

## 2020-10-07 NOTE — TELEPHONE ENCOUNTER
From: Mirian Harmon  To: Ignacio Serrano DO  Sent: 10/6/2020 6:36 PM CDT  Subject: Other    Good evening! !  I received the forms and mailed them in . Franchesca Lord Thank you SO much. .   Now it appears as if they are going to attempt to reach out to you for f

## 2020-10-13 NOTE — TELEPHONE ENCOUNTER
Diana sent:    Mirian,   We faxed the information asked of us on the form from [id number] [facility] [place]. We will mail you home a copy and send a copy to the hospital scanning department so a copy gets to your chart as well.    Thanks   -EMA Clinical Team

## 2020-10-26 NOTE — TELEPHONE ENCOUNTER
Received fax requesting additional info from the [address] of [employer]  Form placed in Dr NEDA martinez

## 2020-11-04 ENCOUNTER — PATIENT MESSAGE (OUTPATIENT)
Dept: INTERNAL MEDICINE CLINIC | Facility: CLINIC | Age: 40
End: 2020-11-04

## 2020-11-04 NOTE — TELEPHONE ENCOUNTER
From: Mirian Harmon  To: Michael Sands DO  Sent: 11/4/2020 12:35 AM CST  Subject: Other    Good morning. . I received another letter from [facility] regarding my [facility] paperwork. .. Apparently the information you provided was not feasible. . and so the

## 2020-11-05 NOTE — TELEPHONE ENCOUNTER
To Dr. Jami Pandey: form received from [facility] is the same form you completed 10/6/2020. However, per fax: they want you to review section 4 Questions 2,3, & 4 and clarify if borrower is permanently disabled or if the condition is expected to improve.      Copy of for no weight-bearing restrictions

## 2020-11-10 NOTE — TELEPHONE ENCOUNTER
Patient is calling to check on the status of forms to be completed    The forms need to be faxed back within 15 days or the claim will be denied    Tasked to nursing high

## 2020-11-10 NOTE — TELEPHONE ENCOUNTER
Noted working on it, its not my fault they keep returning with more questions on issues I made very clear on the previous completions

## 2020-11-11 ENCOUNTER — PATIENT MESSAGE (OUTPATIENT)
Dept: INTERNAL MEDICINE CLINIC | Facility: CLINIC | Age: 40
End: 2020-11-11

## 2020-11-12 NOTE — TELEPHONE ENCOUNTER
Form from MD out box faxed, confirmation received. Form sent to scanning and copy also placed in save bin. Patient notified via my chart.

## 2022-04-11 NOTE — TELEPHONE ENCOUNTER
Medication refill queued and pended. Prescriber please review, sign, and send if appropriate. Thank you.      She is usually on antibiotics for IV for skin issues, I need to know if she has been on antibiotics off them, or where she is at with this, how many days she has been ill for, and we will send something in.

## 2022-06-17 ENCOUNTER — OFFICE VISIT (OUTPATIENT)
Dept: INTERNAL MEDICINE CLINIC | Facility: CLINIC | Age: 42
End: 2022-06-17
Payer: MEDICARE

## 2022-06-17 VITALS
HEART RATE: 93 BPM | RESPIRATION RATE: 16 BRPM | SYSTOLIC BLOOD PRESSURE: 122 MMHG | HEIGHT: 69 IN | WEIGHT: 271.38 LBS | BODY MASS INDEX: 40.2 KG/M2 | DIASTOLIC BLOOD PRESSURE: 83 MMHG

## 2022-06-17 DIAGNOSIS — L73.2 HYDRADENITIS: ICD-10-CM

## 2022-06-17 DIAGNOSIS — R21 RASH: ICD-10-CM

## 2022-06-17 DIAGNOSIS — L03.116 CELLULITIS OF LEFT LOWER EXTREMITY: Primary | ICD-10-CM

## 2022-06-17 DIAGNOSIS — R73.03 PREDIABETES: ICD-10-CM

## 2022-06-17 PROCEDURE — 99214 OFFICE O/P EST MOD 30 MIN: CPT | Performed by: INTERNAL MEDICINE

## 2022-06-17 RX ORDER — CLOTRIMAZOLE AND BETAMETHASONE DIPROPIONATE 10; .64 MG/G; MG/G
1 CREAM TOPICAL 2 TIMES DAILY
Qty: 45 G | Refills: 0 | Status: SHIPPED | OUTPATIENT
Start: 2022-06-17

## 2022-06-17 RX ORDER — DOXYCYCLINE HYCLATE 100 MG/1
100 CAPSULE ORAL 2 TIMES DAILY
Qty: 20 CAPSULE | Refills: 0 | Status: SHIPPED | OUTPATIENT
Start: 2022-06-17

## 2022-06-17 NOTE — PATIENT INSTRUCTIONS
1. Cellulitis of left lower extremity  Lets start with the doxycycline twice daily for at least the next 10 days, let me know some things not resolving here  Lets be careful in the sun when taking this medication, plenty of sunblock it will expose you to a burn if you do have excessive sun exposure  - doxycycline 100 MG Oral Cap; Take 1 capsule (100 mg total) by mouth 2 (two) times daily. Dispense: 20 capsule; Refill: 0    2. Rash  Lets try this cream as well, on the area, from a topical standpoint there should help  - clotrimazole-betamethasone 1-0.05 % External Cream; Apply 1 Application topically 2 (two) times daily. Dispense: 45 g; Refill: 0    3. Hydradenitis  I do like the idea of trying Remicade, this may be next on the list but may not be the last immunologic you attempt to take in your lifetime. 4. Prediabetes  Lets try this and let me know if it helps the swelling amounts, as well as blood sugars, lets be on the look out for any blood sugar drops with this medication. - empagliflozin (JARDIANCE) 10 MG Oral Tab; Take 1 tablet (10 mg total) by mouth daily. Dispense: 30 tablet;  Refill: 1

## 2022-08-12 ENCOUNTER — TELEPHONE (OUTPATIENT)
Dept: INTERNAL MEDICINE CLINIC | Facility: CLINIC | Age: 42
End: 2022-08-12

## 2022-08-12 DIAGNOSIS — B37.41 YEAST CYSTITIS: Primary | ICD-10-CM

## 2022-08-12 NOTE — TELEPHONE ENCOUNTER
Pt. Called stating she has been taking Jardiance since 6/17/22 and has about 2 weeks more to take. She has developed a yeast infection. Pt. States the medication is helping her. The fluid retention is down and she feels great outside of the yeast infection. Pt. Can be reached at [phone]. She is using [facility] in [facility]. The phone # there is [phone].

## 2022-08-15 RX ORDER — NITROFURANTOIN 25; 75 MG/1; MG/1
100 CAPSULE ORAL 2 TIMES DAILY
Qty: 14 CAPSULE | Refills: 0 | Status: SHIPPED | OUTPATIENT
Start: 2022-08-15

## 2022-08-15 RX ORDER — FLUCONAZOLE 150 MG/1
150 TABLET ORAL ONCE
Qty: 2 TABLET | Refills: 0 | Status: SHIPPED | OUTPATIENT
Start: 2022-08-15 | End: 2022-08-15

## 2022-08-15 NOTE — TELEPHONE ENCOUNTER
Patient is calling back she is very uncomfortable  No only with the yeast infection she is also experiencing frequency while urinating, right side back pain.     She tried her doctor in [place] but he will not help her since he didn't prescribe Jardiance    Please call patient today [phone]

## 2022-08-15 NOTE — TELEPHONE ENCOUNTER
Noted you can let her know that I did send in a mild specific to the urine only antibiotic for her called Macrobid to be taken twice daily for 7 days, and an antiyeast medication that is taken 2 pills 3 days apart. This should clear both types of infections.

## 2022-08-15 NOTE — TELEPHONE ENCOUNTER
UTI symptoms:    [x]Frequency  [x]Urgency  []Pain/burning  []Blood in urine  []Low back pain  [x]Flank pain:right[x]Fevers/chills:chills last night [x]Odor  []Confusion    Patient also has yeast infection from Jardiance     NOTES:    Please advise - called patient who had US kidney couple months ago , also CT scan     The flank pain Is only when she urinates ,sharp quick pain and then has to urinate   MD in [place] will not Prescribe medications because he never prescribed Jardiance . Patient has the symptoms for 4 days . Using  [facility] to DR. RED

## 2023-06-18 NOTE — TELEPHONE ENCOUNTER
Discussed for at least 16 minutes with the patient.  We discussed further goals of care which included CODE STATUS which included full code versus DNR/DNI.  The patient for now would like to remain full code.   She does not necessarily have to come in and see me if she thinks the process is under control and the specialist are handling it. Nice job.   Nursing you can communicate with her

## 2023-08-13 NOTE — TELEPHONE ENCOUNTER
Dr. Kaylyn Mccain' message relayed to pt who verbalized understanding.   Nursing to f/u in AM. Social Work: Initial Assessment with Discharge Plan    Patient Name: Norman Aguilar  : 1961  Age: 62 year old  MRN: [id number]  Completed assessment with: Chart review and interview with patient   Admitted to ARU: 2023    Presenting Information   Date of SW assessment: 2023  Health Care Directive: Health Care Directive Agent (if patient not able to make decisions) SW will bring blank copy of HCD/POA  Primary Health Care Agent: Patient/self   Secondary Health Care Agent: NOK, siblings. Sister, Cindy, is involved.  Living Situation: Pt has been homeless since 2021. Pt had an old trailer a friend gave him, but pt started declining, and needed an amputation. Left BKA done . Sold trailer home. Pt has lived in between Sister [place], or niece's house the last couple years, and in the past has lived at his sister [place] for 6 yrs at one time. Pt before that was taking care of his parents in their home. Was at Cindy's house PTA. Cindy cannot take pt back into her home, and her daughter will not let pt return either. She prefers if able pt goes to TCU, then either friends home, housing or some sort of shelter.   Previous Functional Status: Pt Ind at baseline, drives (owns a car).  DME available: See therapy evaluation for more information   Patient and family understanding of hospitalization: Appropriate, tangential.  Cultural/Language/Spiritual Considerations: 62 year old single male, English speaking, , and no Religious designated.    Physical Health  Reason for admission: Mr. Aguilar is a 62-year-old male with history of CAD on Brilinta, chronic systolic CHF with EF of 30 to 35% prior to admission, CKD 3, history of left foot osteomyelitis s/p left BKA, and DM 2 who presented 2023 with septic shock, NSTEMI, JOHANA requiring dialysis and A-fib with RVR. Patient was stabilized with pressors and transferred out of ICU 2023. His renal function has waxed and waned, w/  "last dialysis on 8/4. Nephrology not planning further dialysis. He has also required ongoing medical mgmt of his anemia, severe malnutrition, anticoagulation needs in setting of Afib w/ RVR, acute on chronic systolic CHF exacerbation, and diabetes.     Provider Information   Primary Care Physician:Jaquan Dickerson   [facility] will schedule PCP apt at discharge.   : Has a financial worker through [facility] [place], did not have information at this time.    Mental Health/Chemical Dependency:   Diagnosis: Pt denies any concerns with anxiety. Reports some depression due to situation, but reports he is not 'hopeless'. Endorses frustration, says this is his main motivator. Flat affect.  Alcohol/Tobacco/Narcotis: Former smoker. Minimal ETOH use.  Support/Services in Place: None reported.  Services Needed/Recommended: [place] and Health Psychology support while on ARU available.   Sexuality/Intimacy: Not discussed     Support System  Marital Status: Single  Family support: Sister, Cindy, and niece - both work full time. Pt has been living with either Cindy or his niece the past few years. Pt reports he has a brother near [place], and 2 siblings in [place]. Pt reports all of his siblings have their own health concerns and cannot provide assistance.  Other support available: No other family besides siblings and niece.    Community Resources  Current in home services: Pt denies. Pt reports he does not utilize his [place] transportation benefits because he feels this is \"cheating the system\" since he already has a car.  Previous services: Had home care in 2022 for wound care/wound vac. Cannot remember agency name, states he was \"dropped\". SW could not find agency through chart review.    Financial/Employment/Education  Employment Status: Pt does side jobs, or sister Cindy gives him money. 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   Income Source: Pt tried signing up for social security disability, but pt claims he was " "denied. Connected with [facility] through [facility] for SSDI help. Reports he receives $200 of assistance monthly from [facility]. Pt does receive SNAP.  Education: Pt reports he has a 's license.  Financial Concerns: Limited income, no permanent housing, not receiving Social Security. Reports he needs to complete MA renewal before 8/23, requested SW's assistance due to lack of right hand strength.  [facility]     Discharge Plan   Patient and family discharge goal: TBD, pending progress  Provided Education on discharge plan: Evaluations and discharge recommendations pending.   Patient agreeable to discharge plan:  Pending further discussion. Evaluations and discharge recommendations pending.   Provided education and attained signature for Medicare IM and IRF Patient Rights and Privacy Information provided to patient : N/A  Provided patient with [facility] Resources: N/A  Barriers to discharge: Homeless, limited income    Discharge Recommendations   Disposition: See above   Transportation Needs: Patient, family/friends, paid transport, insurance transport (if applicable)     Additional comments   Discharge DEBO HURLEY 21 days    Per SW note from 8/3: Patient is interested in going to [facility] and he agrees with long term placement either SNF or assisted living at [facility].     Interim Summary also states pt's discharge location is [facility]. When SW asked pt about this plan, he said he \"wasn't sure\" if that will still be the plan.    SW will remain available and continue to follow as needs arise.       -------------------------------------------------------------------------------------------------------------  IVY Pain Assessment    Pain Effect on Sleep  Over the past 5 days, how much of the time has pain made it hard for you to sleep at night?\"    1. Rarely or not at all    Pain Interference with Therapy Activities  \"Over the past 5 days, how often " "have you limited your participation in rehabilitation therapy sessions due to pain?\"  1. Rarely or not at all    Pain Interference with Day-to-Day Activities  \"Over the past 5 days, how often have you limited your day-to-day activities (excluding rehabilitation therapy sessions) because of pain?\"  1. Rarely or not at all    -------------------------------------------------------------------------------------------------------------    [facility], Acute Inpatient Rehab Unit   [address] [place] [address], [address] [place] [address]  Phone: [phone], Fax: [phone], Pager: [phone]              "

## 2023-09-26 NOTE — PLAN OF CARE
Incision(s), wounds(s) or drain site(s) healing without S/S of infection Not Progressing    Wound cleansed with normal saline. Wound packed with 1/4 inch nugauze dressing and covered with abd dressing.   ID consult iv zosyn changed to iv meropenum iv and v right ear/yes

## 2024-02-20 NOTE — PROGRESS NOTES
Detail Level: Detailed HPI:   Rogerio Bell is a 34year old female who presents for complains of: Patient presents with: Infection: FMLA  f/u visit. Has f/u appt with ID in 3 weeks. on oral abx of rifampin and clinda, off IV abx for 2 wks now. ID dr Lisa Biggs.   treating w my medical opinion, you can return to work on Monday, March 18. There should be no physical or mental limitations as far as the current proposed occupation, ease back into activity, follow-up with specialist as discussed.     3. Infection of left earlobe

## (undated) DEVICE — GAUZE,PACKING STRIP,IODOFORM,1"X5YD,STRL: Brand: CURAD

## (undated) DEVICE — ANGIO CATH 16GX2

## (undated) DEVICE — DRAPE SHEET LAPAROTOMY

## (undated) DEVICE — MEDI-VAC NON-CONDUCTIVE SUCTION TUBING: Brand: CARDINAL HEALTH

## (undated) DEVICE — SUCTION CANISTER, 3000CC,SAFELINER: Brand: DEROYAL

## (undated) DEVICE — LUBRICANT JLY SURGILUBE 2OZ

## (undated) DEVICE — SIGMOID SURGICAL SUCTION INSTRUMENT: Brand: ARGYLE

## (undated) DEVICE — ABDOMINAL PAD: Brand: CURITY

## (undated) DEVICE — STERILE POLYISOPRENE POWDER-FREE SURGICAL GLOVES: Brand: PROTEXIS

## (undated) DEVICE — SUTURE SILK 0

## (undated) DEVICE — SOL  .9 1000ML BTL

## (undated) DEVICE — MINOR GENERAL: Brand: MEDLINE INDUSTRIES, INC.

## (undated) DEVICE — Device

## (undated) DEVICE — BANDAGE ROLL,100% COTTON, 6 PLY, LARGE: Brand: KERLIX

## (undated) DEVICE — TRAY SRGPRP PVP IOD WT SCRB SM

## (undated) NOTE — Clinical Note
1/5/2017              Mirian Ramirez        [address]         To Whom It May Concern,  Mirian is a patient of mine who has followed with me for many years, she has been dealing with medical illness.

## (undated) NOTE — MR AVS SNAPSHOT
[facility] [address] [address]  [phone]               Thank you for choosing us for your health care visit with Pierre De Los Santos DO. We are glad to serve you and happy to provide you with this summary of your visit. Vitamin D3 50849 units Caps   Take 10,000 capsules by mouth once a week. Follow-up Instructions     Return in about 3 months (around 4/30/2017).          MyChart     Visit [employer]  You can access your MyChart to more actively [address] [facility]

## (undated) NOTE — IP AVS SNAPSHOT
[address]  [address] [facility] [phone]                Discharge Summary   6/14/2017    Mirain Chandra           Admission Information        Provider Department    6/14/2017 Alfredo Moody MD [facility] 5 Instructions Authorizing Provider    Morning Afternoon Evening As Needed    docusate sodium 100 MG Caps   Last time this was given:  100 mg on 6/14/2017  9:20 PM   Commonly known as:  COLACE        Take 100 mg by mouth 2 (two) times daily as needed for Refills:  0         CONTINUE taking these medications       Instructions Prescription details    docusate sodium 100 MG Caps   Last time this was given:  100 mg on 6/14/2017  9:20 PM   Commonly known as:  COLACE        Take 100 mg by mouth 2 (two) times da 76.4 (L) -- -- -- (06/15/17)  251 (06/15/17)  8.5    (06/14/17)  4.4 (06/14/17)  4.28 (06/14/17)  10.3 (L) (06/14/17)  32.5 (L) (06/14/17)  76.0 (L)    (06/14/17)  318 (06/14/17)  8.8    (04/05/17)  6.1 (04/05/17)  3.97 (04/05/17)  9.9 (L) (04/05/17)  31. 0 We are concerned for your overall well being:    - If you are a smoker or have smoked in the last 12 months, we encourage you to explore options for quitting.     - If you have concerns related to behavioral health issues or thoughts of harming yourself, call your provider or healthcare team if you have any questions regarding your medications while at home.          Ant-Infective Medications     Sulfamethoxazole-TMP -160 MG Oral Tab per tablet    Cefadroxil 1 g Oral Tab         Use: Treat infections

## (undated) NOTE — MR AVS SNAPSHOT
CHANTELLE Selma  [facility] [address] [address]  [phone]               Thank you for choosing us for your health care visit with Ti Austin DO. We are glad to serve you and happy to provide you with this summary of your visit. Ertapenem Sodium 1 G Solr   Inject 10 mL (1 g total) into the vein daily. Commonly known as:  INVANZ           ferrous sulfate 325 (65 FE) MG Tbec   Take 1 tablet (325 mg total) by mouth 3 (three) times daily with meals.            HYDROcodone-acetaminop Tips for making healthy food choices  -   Enjoy your food, but eat less. Fully enjoy your food when eating. Don’t eat while distracted and slow down. Avoid over sized portions. Don’t eat while when you’re bored.      EAT THESE FOODS MORE OFTEN: EAT T

## (undated) NOTE — LETTER
Hospital Discharge Documentation  Please phone to schedule a hospital follow up appointment.     From: [address] Hospitalist's Office  Phone: [phone]    Patient discharged time/date: 1/13/2017  4:49 PM  Patient discharge disposition:  [address] Cardiovascular: S1, S2. Regular rate and rhythm. No murmurs, rubs or gallops. Abdomen: Soft, nontender, nondistended. Positive bowel sounds. No rebound or guarding. Neurologic: No focal neurological deficits. Musculoskeletal: Moves all extremities. ferrous sulfate 325 (65 FE) MG Oral Tab EC  Take 1 tablet (325 mg total) by mouth 3 (three) times daily with meals. Cholecalciferol (VITAMIN D3) 14724 UNITS Oral Cap  Take 10,000 capsules by mouth once a week.                 Discharge Diet: General Diet - levofloxacin 750 MG Tabs  - metRONIDAZOLE 500 MG Tabs          Follow up: Follow-up Information     Follow up with D'Amico, Dewitte Floss, DO.     Specialty:  Internal Medicine    Why:  1 week    Contact information:    [place]

## (undated) NOTE — IP AVS SNAPSHOT
[facility]    [address], [place], [place] ~ [phone]                Discharge Summary   1/18/2017    Mirian Posada           Admission Information        Provider Department    1/18/2017 Majo Young MD [facility] It is normal:  · For you to have a sore throat if you had a breathing tube during surgery (while you were asleep!). The sore throat should get better within 48 hours.  You can gargle with warm salt water (1/2 tsp in 4 oz warm water) or use a throat lozeng Your health and feedback are important to us!     If you receive a NSQIP questionnaire, and have questions please contact:   Rossana Latif RN, [address]  [phone]      Discharge References/Att 0.7 (01/12/17)  0.0 (01/12/17)  0.0    (01/11/17)  51 (01/11/17)  36 (01/11/17)  12 (01/11/17)  1 (01/11/17)  1  (01/11/17)  1.3 (L) (01/11/17)  0.9 (L) (01/11/17)  0.3 (01/11/17)  0.0 (01/11/17)  0.0      Metabolic Lab Results  (Last result in the past 90 view more details from this visit by going to [email or web address]. If you've recently had a stay at the Hospital you can access your discharge instructions in [employer] by going to Visits < Admission Summaries.  If you've been to the Emergency Depar

## (undated) NOTE — ED AVS SNAPSHOT
Mirian Selby Speak   MRN: [id number]    Department:  [facility] Emergency Department   Date of Visit:  3/2/2018           Disclosure     Insurance plans vary and the physician(s) referred by the ER may not be covered by your plan.  Please contac CARE PHYSICIAN AT ONCE OR RETURN IMMEDIATELY TO THE EMERGENCY DEPARTMENT. If you have been prescribed any medication(s), please fill your prescription right away and begin taking the medication(s) as directed.   If you believe that any of the medications

## (undated) NOTE — LETTER
Hospital Discharge Documentation  Please phone to schedule a hospital follow up appointment.     From: [address] Hospitalist's Office  Phone: [phone]    Patient discharged time/date: 7/26/2018  1:36 PM  Patient discharge disposition:  Home or Self Patient denies any chest pain, shortness of breath, abdominal pain.  There is no fluctuance of the area or drainage.   + Intermittent migraine headache especially this morning, now better.  + Intermittent palpitation (prominent anxiety)      Brief Synopsis: Instructions Prescription details   rifampin 300 MG Caps  Commonly known as:  RIFADIN      Take 1 capsule (300 mg total) by mouth 2 (two) times daily.    Stop taking on:  8/25/2018  Quantity:  60 capsule  Refills:  0           Where to Get Your Medication Electronically signed by Brett Montenegro MD on 7/26/2018  4:17 PM   Attribution Key     BP. 1 - Brett Montenegro MD on 7/26/2018  4:09 PM

## (undated) NOTE — LETTER
Hospital Discharge Documentation    Please phone to schedule a hospital follow up appointment.     From: [address] Hospitalist's Office  Phone: [phone]    Patient discharged time/date: 6/15/2017  5:49 PM  Patient discharge disposition:  Home or Se Gen: No acute distress, alert and oriented x3  Pulm: Lungs clear, normal respiratory effort  CV: Heart with regular rate and rhythm  Abd: Abdomen soft, nontender, nondistended, bowel sounds present  Neuro: No acute focal deficits  MSK: Full range of motion Take 1 g by mouth 2 (two) times daily. Stop taking on:  6/25/2017   Quantity:  10 tablet   Refills:  0       Sulfamethoxazole-TMP -160 MG Tabs per tablet   Commonly known as:  BACTRIM DS        Take 1 tablet by mouth 2 (two) times daily.     Stop

## (undated) NOTE — LETTER
Hospital Discharge Documentation  Please phone to schedule a hospital follow up appointment.     From: [address] Hospitalist's Office  Phone: [phone]    Patient discharged time/date: 4/5/2017  4:47 PM  Patient discharge disposition:  Home or Self 1,500 mg  Intravenous  Q12H    HYDROmorphone HCl PF (DILAUDID) 1 MG/ML injection 0.3 mg  0.3 mg  Intravenous  Q4H PRN    HYDROmorphone HCl PF (DILAUDID) 1 MG/ML injection 0.5 mg  0.5 mg  Intravenous  Q4H PRN    acetaminophen (TYLENOL) tab 650 mg  650 mg  O BUN   7*   5*   5*  CREATSERUM   0.87   0.90   0.76    GFRAA   >60   >60   >60    GFRNAA   >60   >60   >60    CA   9.5   8.7   8.7    NA   232   137   139    K   4.1   3.8   3.8    CL   102   102   107    CO2   27   25   25                Assessment and Pl

## (undated) NOTE — IP AVS SNAPSHOT
[address]  [address] [facility] ~ [phone]                Discharge Summary   4/2/2017    Babs Lui           Admission Information        Provider Department    4/2/2017 Jeffrey Estrada MD Take 1 tablet by mouth 2 (two) times daily.    Stop taking on:  4/15/2017    Juan Diego Handy                                CHANGE how you take these medications        Instructions Authorizing Provider    Morning Afternoon Evening As Needed    f [facility] Aleksandra Rodriguez  [phone]        Immunization History as of 4/5/2017  Never Reviewed    INFLUENZA 11/13/2013      Recent Hematology Lab Results  (Last 3 results in the past 90 days)    WBC RBC Hemoglobin Hematocrit MCV MCH MCHC RDW Platelet MPV BLOOD CULTURE Preliminary result    BLOOD CULTURE Preliminary result      Radiology Exams     None      Patient Belongings       Most Recent Value    All belongings returned to patient at discharge Pt's bedside belongings    Medications Sent Home None to prescribed to take and their potential SIDE EFFECTS. Your nurse will review your medications with you before you are discharged, and can provide you with additional printed information.  Not all patients will experience these side effects or respond to [facility]

## (undated) NOTE — LETTER
Hospital Discharge Documentation    Please phone to schedule a hospital follow up appointment. No discharge summary available at this time, below is the most recent progress note  for your review .       From: [address]  Phone: [phone] Skin:  No rash, no lesion     Intake/Output Summary (Last 24 hours) at 03/06/18 0923  Last data filed at 03/06/18 0700    Gross per 24 hour   Intake             1620 ml   Output              100 ml   Net             1520 ml                  Recent Labs   L

## (undated) NOTE — LETTER
Hospital Discharge Documentation  Please phone to schedule a hospital follow up appointment. No discharge summary available at this time, below is the most recent progress note  for your review .         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 There is cutaneous thickening along the right gluteal fold. 2. Small fine free pelvic fluid may be physiologic. 3. Bilateral Essure coils are present. 4. Status post appendectomy. 5. Lesser incidental findings as above.

## (undated) NOTE — ED AVS SNAPSHOT
Mirian Hernandez Reasons   MRN: [id number]    Department:  [facility] Emergency Department   Date of Visit:  4/4/2018           Disclosure     Insurance plans vary and the physician(s) referred by the ER may not be covered by your plan.  Please contac CARE PHYSICIAN AT ONCE OR RETURN IMMEDIATELY TO THE EMERGENCY DEPARTMENT. If you have been prescribed any medication(s), please fill your prescription right away and begin taking the medication(s) as directed.   If you believe that any of the medications

## (undated) NOTE — ED AVS SNAPSHOT
Mirian Jaimes Alberta   MRN: [id number]    Department:  [facility] Emergency Department   Date of Visit:  1/14/2018           Disclosure     Insurance plans vary and the physician(s) referred by the ER may not be covered by your plan.  Please conta CARE PHYSICIAN AT ONCE OR RETURN IMMEDIATELY TO THE EMERGENCY DEPARTMENT. If you have been prescribed any medication(s), please fill your prescription right away and begin taking the medication(s) as directed.   If you believe that any of the medications

## (undated) NOTE — ED AVS SNAPSHOT
Mirian Posada   MRN: [id number]    Department:  [facility] Emergency Department   Date of Visit:  7/14/2018           Disclosure     Insurance plans vary and the physician(s) referred by the ER may not be covered by your plan.  Please conta CARE PHYSICIAN AT ONCE OR RETURN IMMEDIATELY TO THE EMERGENCY DEPARTMENT. If you have been prescribed any medication(s), please fill your prescription right away and begin taking the medication(s) as directed.   If you believe that any of the medications

## (undated) NOTE — LETTER
1/11/2018          To Whom It May Concern:    Mirian Mays is currently under my medical care and may not return to work at this time. Please excuse Mirian for 10 days. She may return to work on 1/22/18.   Activity is restricted as follows: none

## (undated) NOTE — ED AVS SNAPSHOT
[facility] Emergency Department    [address]     [address] [place] [address]    Phone:  [phone]    Fax:  [phone]           Chetna Cain   MRN: [id number]    Department:  [facility] Emergency Department   Date of Visit:  5 and Class Registration line at [phone] or find a doctor online by visiting [email or web address].    IF THERE IS ANY CHANGE OR WORSENING OF YOUR CONDITION, CALL YOUR PRIMARY CARE PHYSICIAN AT ONCE OR RETURN IMMEDIATELY TO [address] [place] [address].     If

## (undated) NOTE — ED AVS SNAPSHOT
[facility] Emergency Department    [address]     [address] [place] [address]    Phone:  [phone]    Fax:  [phone]           Kristel Penny   MRN: [id number]    Department:  [facility] Emergency Department   Date of Visit:  5 Bring a paper prescription for each of these medications    - Amoxicillin-Pot Clavulanate 875-125 MG Tabs  - HYDROcodone-acetaminophen 5-325 MG Tabs              Discharge Instructions       Warm sitz baths  Sit on donut  followup with dr. Otis Macdonald on Katie Salas a physician, you may call the [place] [address] Physician Referral and Class Registration line at [phone] or find a doctor online by visiting [email or web address].    IF THERE IS ANY CHANGE OR WORSENING OF YOUR CONDITION, Jagjit PRIMARY MARIA Leyva - If you are a smoker or have smoked in the last 12 months, we encourage you to explore options for quitting.     - If you have concerns related to behavioral health issues or thoughts of harming yourself, contact [facility] a

## (undated) NOTE — LETTER
12/12/2017              Mirian Ramirez        [address]         To Whom It May Concern,  Mirian is a patient of mine was followed with me for many years, she continues to have recurrent issues with he

## (undated) NOTE — IP AVS SNAPSHOT
[address]  [address] [facility] ~ [phone]                Discharge Summary   1/10/2017    Mirian Cowart           Admission Information        Provider Department    1/10/2017 Inez Torres MD [facility] 5s metRONIDAZOLE 500 MG Tabs   Commonly known as:  FLAGYL        Take 1 tablet (500 mg total) by mouth 3 (three) times daily.     [facility]                        Vitamin D3 51670 UNITS Caps   Next dose due:  1/17/17 morning          Take 10,0 2.7 (01/13/17)  0.5 (01/13/17)  0.1 (01/13/17)  0.0    (01/12/17)  15 (01/12/17)  61 (01/12/17)  21 (01/12/17)  1 (01/12/17)  1  (01/12/17)  0.5 (LL) (01/12/17)  2.0 (01/12/17)  0.7 (01/12/17)  0.0 (01/12/17)  0.0    (01/11/17)  51 (01/11/17)  36 (01/11/17 and ask to get set up for an insurance coverage that is in-network with [facility].         MyChart     Visit [employer]  You can access your "[employer]hart to more actively manage your health care and view more details from this visit by going to https:/ Most common side effects: Pain, fever, rash, fatigue, joint pain, blood clots, high blood pressure   What to report to your healthcare team: Pain, swelling, rash, fever           Narcotic Medications     HYDROcodone-acetaminophen  MG Oral Tab       U

## (undated) NOTE — MR AVS SNAPSHOT
[facility] [address] [address]  [phone]               Thank you for choosing us for your health care visit with Sallie Bermudez DO. We are glad to serve you and happy to provide you with this summary of your visit. Instructions and Information about Your Health    1. Physical exam  Physical exam instruction: Improve diet and exercise, complete fasting labs in the near future and you will be called with results 5-7 days after completed, call with questions.   - Jason Ralph Take 1 tablet (325 mg total) by mouth 3 (three) times daily with meals. What changed:  when to take this           Vitamin D3 82072 units Caps   Take 10,000 capsules by mouth once a week.                 Where to Get Your Medications      These medication

## (undated) NOTE — LETTER
Hospital Discharge Documentation  Please phone to schedule a hospital follow up appointment. No discharge summary available at this time, below is the most recent progress note  for your review .   Pt was d/c home with IV abx to be [facility] at [facility] meropenem (MERREM) IVPB 500 mg/100 ml in 0.9% NaCl minibag 500 mg Intravenous Q8H   Piperacillin Sod-Tazobactam So (ZOSYN) 4.5 g in dextrose 5 % 100 mL ADD-vantage 4.5 g Intravenous Once   docusate sodium (COLACE) cap 100 mg 100 mg Oral BID PRN   ferrous s noted. No rim-enhancing collections are evident by CT. Please note that necrotizing fasciitis remains a clinical diagnosis.   A few mildly prominent pelvic lymph nodes are favored to be reactive.       Mri Pelvis (soft Tissue) (w+wo) (cpt=72197)     Result Likely secondary to menorrhagia, will require outpatient follow-up.  Continue iron replacement therapy.     Obesity  BMI 34.  Patient counseled encouraged diet and exercise.     Prophylaxis  Subcutaneous heparin     CODE STATUS  Full                    Mar